# Patient Record
Sex: FEMALE | Race: BLACK OR AFRICAN AMERICAN | NOT HISPANIC OR LATINO | Employment: PART TIME | URBAN - METROPOLITAN AREA
[De-identification: names, ages, dates, MRNs, and addresses within clinical notes are randomized per-mention and may not be internally consistent; named-entity substitution may affect disease eponyms.]

---

## 2018-06-29 ENCOUNTER — LAB VISIT (OUTPATIENT)
Dept: LAB | Facility: HOSPITAL | Age: 18
End: 2018-06-29
Attending: SPECIALIST
Payer: MEDICAID

## 2018-06-29 ENCOUNTER — APPOINTMENT (OUTPATIENT)
Dept: LAB | Facility: HOSPITAL | Age: 18
End: 2018-06-29
Attending: SPECIALIST
Payer: MEDICAID

## 2018-06-29 ENCOUNTER — OFFICE VISIT (OUTPATIENT)
Dept: OBSTETRICS AND GYNECOLOGY | Facility: CLINIC | Age: 18
End: 2018-06-29
Payer: MEDICAID

## 2018-06-29 VITALS — WEIGHT: 273.81 LBS | BODY MASS INDEX: 41.5 KG/M2 | HEIGHT: 68 IN

## 2018-06-29 DIAGNOSIS — Z32.01 POSITIVE PREGNANCY TEST: ICD-10-CM

## 2018-06-29 DIAGNOSIS — Z32.01 POSITIVE PREGNANCY TEST: Primary | ICD-10-CM

## 2018-06-29 DIAGNOSIS — Z3A.10 10 WEEKS GESTATION OF PREGNANCY: ICD-10-CM

## 2018-06-29 LAB
ABO + RH BLD: NORMAL
BASOPHILS # BLD AUTO: 0.03 K/UL
BASOPHILS NFR BLD: 0.3 %
BLD GP AB SCN CELLS X3 SERPL QL: NORMAL
DIFFERENTIAL METHOD: ABNORMAL
EOSINOPHIL # BLD AUTO: 0.1 K/UL
EOSINOPHIL NFR BLD: 1.5 %
ERYTHROCYTE [DISTWIDTH] IN BLOOD BY AUTOMATED COUNT: 15.1 %
HBV SURFACE AG SERPL QL IA: NEGATIVE
HCT VFR BLD AUTO: 38.5 %
HGB BLD-MCNC: 12.5 G/DL
HIV 1+2 AB+HIV1 P24 AG SERPL QL IA: NEGATIVE
IMM GRANULOCYTES # BLD AUTO: 0.04 K/UL
IMM GRANULOCYTES NFR BLD AUTO: 0.4 %
LYMPHOCYTES # BLD AUTO: 1.4 K/UL
LYMPHOCYTES NFR BLD: 14.6 %
MCH RBC QN AUTO: 26.4 PG
MCHC RBC AUTO-ENTMCNC: 32.5 G/DL
MCV RBC AUTO: 81 FL
MONOCYTES # BLD AUTO: 0.5 K/UL
MONOCYTES NFR BLD: 4.9 %
NEUTROPHILS # BLD AUTO: 7.4 K/UL
NEUTROPHILS NFR BLD: 78.3 %
NRBC BLD-RTO: 0 /100 WBC
PLATELET # BLD AUTO: 383 K/UL
PMV BLD AUTO: 9.7 FL
RBC # BLD AUTO: 4.74 M/UL
TSH SERPL DL<=0.005 MIU/L-ACNC: 1.45 UIU/ML
WBC # BLD AUTO: 9.48 K/UL

## 2018-06-29 PROCEDURE — 88175 CYTOPATH C/V AUTO FLUID REDO: CPT

## 2018-06-29 PROCEDURE — 87491 CHLMYD TRACH DNA AMP PROBE: CPT

## 2018-06-29 PROCEDURE — 84443 ASSAY THYROID STIM HORMONE: CPT

## 2018-06-29 PROCEDURE — 87624 HPV HI-RISK TYP POOLED RSLT: CPT

## 2018-06-29 PROCEDURE — 86762 RUBELLA ANTIBODY: CPT

## 2018-06-29 PROCEDURE — 86592 SYPHILIS TEST NON-TREP QUAL: CPT

## 2018-06-29 PROCEDURE — 76817 TRANSVAGINAL US OBSTETRIC: CPT | Mod: 26,S$PBB,, | Performed by: SPECIALIST

## 2018-06-29 PROCEDURE — 99203 OFFICE O/P NEW LOW 30 MIN: CPT | Mod: 25,TH,S$PBB, | Performed by: SPECIALIST

## 2018-06-29 PROCEDURE — 85025 COMPLETE CBC W/AUTO DIFF WBC: CPT

## 2018-06-29 PROCEDURE — 87340 HEPATITIS B SURFACE AG IA: CPT

## 2018-06-29 PROCEDURE — 81220 CFTR GENE COM VARIANTS: CPT

## 2018-06-29 PROCEDURE — 76817 TRANSVAGINAL US OBSTETRIC: CPT | Mod: PBBFAC,PN | Performed by: SPECIALIST

## 2018-06-29 PROCEDURE — 99203 OFFICE O/P NEW LOW 30 MIN: CPT | Mod: PBBFAC,TH,PN | Performed by: SPECIALIST

## 2018-06-29 PROCEDURE — 36415 COLL VENOUS BLD VENIPUNCTURE: CPT | Mod: PO

## 2018-06-29 PROCEDURE — 99999 PR PBB SHADOW E&M-NEW PATIENT-LVL III: CPT | Mod: PBBFAC,,, | Performed by: SPECIALIST

## 2018-06-29 PROCEDURE — 86850 RBC ANTIBODY SCREEN: CPT

## 2018-06-29 PROCEDURE — 86901 BLOOD TYPING SEROLOGIC RH(D): CPT | Mod: 91

## 2018-06-29 PROCEDURE — 86703 HIV-1/HIV-2 1 RESULT ANTBDY: CPT

## 2018-06-29 RX ORDER — ONDANSETRON 4 MG/1
4 TABLET, ORALLY DISINTEGRATING ORAL EVERY 6 HOURS PRN
Qty: 15 TABLET | Refills: 2 | Status: SHIPPED | OUTPATIENT
Start: 2018-06-29 | End: 2018-11-16

## 2018-06-29 NOTE — PROGRESS NOTES
16 yo BF G1 presets for initial prenatal care  LMP Approx April 23.    Past Medical History:   Diagnosis Date    ADHD (attention deficit hyperactivity disorder)     Anxiety     Depression     H/O seasonal allergies        Past Surgical History:   Procedure Laterality Date    ADENOIDECTOMY      TONSILLECTOMY  2011       Family History   Problem Relation Age of Onset    Breast cancer Paternal Aunt     Ovarian cancer Neg Hx        Social History     Social History    Marital status: Single     Spouse name: N/A    Number of children: N/A    Years of education: N/A     Social History Main Topics    Smoking status: Never Smoker    Smokeless tobacco: Never Used    Alcohol use No    Drug use: No    Sexual activity: Yes     Other Topics Concern    None     Social History Narrative    None       No current outpatient prescriptions on file.     No current facility-administered medications for this visit.        Review of patient's allergies indicates:  No Known Allergies    Review of System:   General: no chills, fever, night sweats, weight gain or weight loss  Psychological: no depression or suicidal ideation  Breasts: no new or changing breast lumps, nipple discharge or masses.  Respiratory: no cough, shortness of breath, or wheezing  Cardiovascular: no chest pain or dyspnea on exertion  Gastrointestinal: no abdominal pain, change in bowel habits, or black or bloody stools  Genito-Urinary: no incontinence, urinary frequency/urgency or vulvar/vaginal symptoms, pelvic pain or abnormal vaginal bleeding.  Musculoskeletal: no gait disturbance or muscular weakness    General Appearance:  Alert, cooperative, no distress, appears stated age   Head:  Normocephalic, without obvious abnormality, atraumatic   Eyes:  PERRL, conjunctiva/corneas clear, EOM's intact, fundi benign, both eyes   Ears:  Normal TM's and external ear canals, both ears   Nose: Nares normal, septum midline,mucosa normal, no drainage or sinus  tenderness   Throat: Lips, mucosa, and tongue normal; teeth and gums normal   Neck: Supple, symmetrical, trachea midline, no adenopathy;  thyroid: not enlarged, symmetric, no tenderness/mass/nodules; no carotid bruit or JVD   Back:   Symmetric, no curvature, ROM normal, no CVA tenderness   Lungs:   Clear to auscultation bilaterally, respirations unlabored   Breasts:  No masses or tenderness   Heart:  Regular rate and rhythm, S1 and S2 normal, no murmur, rub, or gallop   Abdomen:   Soft, non-tender, bowel sounds active all four quadrants,  no masses, no organomegaly    Genitourinary:   External rectal exam shows no thrombosed external hemorrhoids.   Pelvic exam was performed with patient supine.  No labial fusion.  There is no rash, lesion or injury on the right labia.  There is no rash, lesion or injury on the left labia.  No bleeding and no signs of injury around the vaginal introitus, urethra is without lesions and well supported. The cervix is visualized with no discharge, lesions or friability.  No vaginal discharge found.  No significant Cystocele, Enterocele or rectocele, and uterus well supported.  Bimanual exam:  The urethra is normal to palpation and there are no palpable vaginal wall masses.  Uterus is not deviated, not enlarged, not fixed, normal shape and not tender.  Cervix exhibits no motion tenderness.   Right adnexum displays no mass and no tenderness.  Left adnexum displays no mass and no tenderness.   Extremities: Extremities normal, atraumatic, no cyanosis or edema   Pulses: 2+ and symmetric   Skin: Skin color, texture, turgor normal, no rashes or lesions   Lymph nodes: Cervical, supraclavicular, and axillary nodes normal   Neurologic: Normal       PAP  GC/CZ submitted    Procedure TVS 6.5 MHz probe  Positive IUP CRL 10w1  viewed by pt  EDC 1/24/2019    Prenatal care plan discussed  Obtain PNP  RTO 4 weeks

## 2018-06-29 NOTE — PROCEDURES
Procedures     Procedure TVS 6.5 MHz probe  Positive IUP CRL 10w1  viewed by pt  EDC 1/24/2019

## 2018-06-30 LAB
C TRACH DNA SPEC QL NAA+PROBE: NOT DETECTED
N GONORRHOEA DNA SPEC QL NAA+PROBE: NOT DETECTED
RH BLD: NORMAL
RPR SER QL: NORMAL

## 2018-07-02 LAB
CFTR MUT ANL BLD/T: NORMAL
RUBV IGG SER-ACNC: 41 IU/ML
RUBV IGG SER-IMP: REACTIVE

## 2018-07-06 LAB
HPV HR 12 DNA CVX QL NAA+PROBE: POSITIVE
HPV16 AG SPEC QL: NEGATIVE
HPV18 DNA SPEC QL NAA+PROBE: NEGATIVE

## 2018-07-27 ENCOUNTER — ROUTINE PRENATAL (OUTPATIENT)
Dept: OBSTETRICS AND GYNECOLOGY | Facility: CLINIC | Age: 18
End: 2018-07-27
Payer: MEDICAID

## 2018-07-27 VITALS — DIASTOLIC BLOOD PRESSURE: 74 MMHG | WEIGHT: 276.25 LBS | SYSTOLIC BLOOD PRESSURE: 130 MMHG | BODY MASS INDEX: 42 KG/M2

## 2018-07-27 DIAGNOSIS — Z3A.14 14 WEEKS GESTATION OF PREGNANCY: Primary | ICD-10-CM

## 2018-07-27 LAB
BILIRUB SERPL-MCNC: NORMAL MG/DL
BLOOD URINE, POC: NORMAL
COLOR, POC UA: YELLOW
GLUCOSE UR QL STRIP: NORMAL
KETONES UR QL STRIP: NORMAL
LEUKOCYTE ESTERASE URINE, POC: NORMAL
NITRITE, POC UA: NORMAL
PH, POC UA: 7
PROTEIN, POC: NORMAL
SPECIFIC GRAVITY, POC UA: NORMAL
UROBILINOGEN, POC UA: NORMAL

## 2018-07-27 PROCEDURE — 99999 PR PBB SHADOW E&M-EST. PATIENT-LVL II: CPT | Mod: PBBFAC,,, | Performed by: SPECIALIST

## 2018-07-27 PROCEDURE — 99213 OFFICE O/P EST LOW 20 MIN: CPT | Mod: TH,S$PBB,, | Performed by: SPECIALIST

## 2018-07-27 PROCEDURE — 81002 URINALYSIS NONAUTO W/O SCOPE: CPT | Mod: PBBFAC,PN | Performed by: SPECIALIST

## 2018-07-27 PROCEDURE — 99212 OFFICE O/P EST SF 10 MIN: CPT | Mod: PBBFAC,PN | Performed by: SPECIALIST

## 2018-07-27 NOTE — PROGRESS NOTES
Pt returns for PNC  Reveiwed and discussed PN lab  Discussed and recommend anatomy  U/s 18 weeks  I reviewed pt's past medical history, past and current meds, family history, allergies and reviewed problem list  RTO 4 weeks

## 2018-08-02 ENCOUNTER — NURSE TRIAGE (OUTPATIENT)
Dept: ADMINISTRATIVE | Facility: CLINIC | Age: 18
End: 2018-08-02

## 2018-08-02 NOTE — TELEPHONE ENCOUNTER
"Having medium red bleeding 15 weeks pregnant.    Reason for Disposition   Shoulder pain    Answer Assessment - Initial Assessment Questions  1. ONSET: "When did this bleeding start?"        Yesterday morning  2. DESCRIPTION: "Describe the bleeding that you are having." "How much bleeding is there?"     - SPOTTING: spotting, or pinkish / brownish mucous discharge; does not fill panti-liner or pad     - MILD:  less than 1 pad / hour; less than patient's usual menstrual bleeding    - MODERATE: 1-2 pads / hour; small-medium blood clots (e.g., pea, grape, small coin)     - SEVERE: soaking 2 or more pads/hour for 2 or more hours; bleeding not contained by pads or continuous red blood from vagina; large blood clots (e.g., golf ball, large coin)       Only when wiping self X 2 yesterday morning and again this morning. Had sexual intercourse on Monday and Tuesday. And had bleeding on Wednesday and Thursday morning  3. ABDOMINAL PAIN SEVERITY: If present, ask: "How bad is it?"  (e.g., Scale 1-10; mild, moderate, or severe)    - MILD (1-3): doesn't interfere with normal activities, abdomen soft and not tender to touch     - MODERATE (4-7): interferes with normal activities or awakens from sleep, tender to touch     - SEVERE (8-10): excruciating pain, doubled over, unable to do any normal activities      Abdominal cramping 3-4 constant for about 10 min  4. PREGNANCY: "Do you know how many weeks or months pregnant you are?" "When was the first day of your last normal menstrual period?"      15 weeks  5. HEMODYNAMIC STATUS: "Are you weak or feeling lightheaded?" If so, ask: "Can you stand and walk normally?"       denies  6. OTHER SYMPTOMS: "What other symptoms are you having with the bleeding?" (e.g., passed tissue, vaginal discharge, fever, menstrual-type cramps)      Round ligament pain    Protocols used: ST PREGNANCY - VAGINAL BLEEDING LESS THAN 20 WEEKS EGA-A-      "

## 2018-08-17 ENCOUNTER — TELEPHONE (OUTPATIENT)
Dept: OBSTETRICS AND GYNECOLOGY | Facility: CLINIC | Age: 18
End: 2018-08-17

## 2018-08-17 NOTE — TELEPHONE ENCOUNTER
----- Message from Alice Trinh sent at 8/17/2018 10:16 AM CDT -----  Contact: Patient  Type: Needs Medical Advice    Who Called:  Patient  Best Call Back Number: [phone] (home)     Additional Information: Patient is requesting a clearance to go to the dentist, please call to advise.

## 2018-08-24 ENCOUNTER — ROUTINE PRENATAL (OUTPATIENT)
Dept: OBSTETRICS AND GYNECOLOGY | Facility: CLINIC | Age: 18
End: 2018-08-24
Payer: MEDICAID

## 2018-08-24 ENCOUNTER — HOSPITAL ENCOUNTER (OUTPATIENT)
Dept: RADIOLOGY | Facility: HOSPITAL | Age: 18
Discharge: HOME | End: 2018-08-24
Attending: SPECIALIST
Payer: MEDICAID

## 2018-08-24 VITALS
WEIGHT: 277.13 LBS | SYSTOLIC BLOOD PRESSURE: 118 MMHG | DIASTOLIC BLOOD PRESSURE: 78 MMHG | BODY MASS INDEX: 40.92 KG/M2

## 2018-08-24 DIAGNOSIS — Z3A.18 18 WEEKS GESTATION OF PREGNANCY: Primary | ICD-10-CM

## 2018-08-24 DIAGNOSIS — Z3A.14 14 WEEKS GESTATION OF PREGNANCY: ICD-10-CM

## 2018-08-24 LAB
BILIRUB SERPL-MCNC: NORMAL MG/DL
BLOOD URINE, POC: NORMAL
COLOR, POC UA: YELLOW
GLUCOSE UR QL STRIP: NORMAL
KETONES UR QL STRIP: NORMAL
LEUKOCYTE ESTERASE URINE, POC: NORMAL
NITRITE, POC UA: NORMAL
PH, POC UA: 5
PROTEIN, POC: NORMAL
SPECIFIC GRAVITY, POC UA: NORMAL
UROBILINOGEN, POC UA: NORMAL

## 2018-08-24 PROCEDURE — 99212 OFFICE O/P EST SF 10 MIN: CPT | Mod: PBBFAC,25,PN | Performed by: SPECIALIST

## 2018-08-24 PROCEDURE — 81002 URINALYSIS NONAUTO W/O SCOPE: CPT | Mod: PBBFAC,PN | Performed by: SPECIALIST

## 2018-08-24 PROCEDURE — 76805 OB US >/= 14 WKS SNGL FETUS: CPT | Mod: TC,PN

## 2018-08-24 PROCEDURE — 76805 OB US >/= 14 WKS SNGL FETUS: CPT | Mod: 26,,, | Performed by: RADIOLOGY

## 2018-08-24 PROCEDURE — 99213 OFFICE O/P EST LOW 20 MIN: CPT | Mod: TH,S$PBB,, | Performed by: SPECIALIST

## 2018-08-24 PROCEDURE — 99999 PR PBB SHADOW E&M-EST. PATIENT-LVL II: CPT | Mod: PBBFAC,,, | Performed by: SPECIALIST
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
## 2018-09-21 ENCOUNTER — ROUTINE PRENATAL (OUTPATIENT)
Dept: OBSTETRICS AND GYNECOLOGY | Facility: CLINIC | Age: 18
End: 2018-09-21
Payer: MEDICAID

## 2018-09-21 VITALS
DIASTOLIC BLOOD PRESSURE: 60 MMHG | SYSTOLIC BLOOD PRESSURE: 122 MMHG | BODY MASS INDEX: 40.96 KG/M2 | WEIGHT: 277.31 LBS

## 2018-09-21 DIAGNOSIS — Z3A.26 26 WEEKS GESTATION OF PREGNANCY: Primary | ICD-10-CM

## 2018-09-21 PROCEDURE — 99213 OFFICE O/P EST LOW 20 MIN: CPT | Mod: TH,S$PBB,, | Performed by: SPECIALIST

## 2018-09-21 PROCEDURE — 99999 PR PBB SHADOW E&M-EST. PATIENT-LVL II: CPT | Mod: PBBFAC,,, | Performed by: SPECIALIST

## 2018-09-21 PROCEDURE — 99212 OFFICE O/P EST SF 10 MIN: CPT | Mod: PBBFAC,PN | Performed by: SPECIALIST

## 2018-09-21 NOTE — PROGRESS NOTES
Pt returns for continued care  Discussed GD screen and Rhogam administration on RTO   I reviewed pt's past medical history, past and current meds, family history, allergies and reviewed problem list  Plan RTO 4 weeks  GD screen and Rhogam

## 2018-09-27 ENCOUNTER — TELEPHONE (OUTPATIENT)
Dept: OBSTETRICS AND GYNECOLOGY | Facility: CLINIC | Age: 18
End: 2018-09-27

## 2018-09-27 NOTE — TELEPHONE ENCOUNTER
Patient states ate peanuts now tongue swollen, denies any shortness of breathe or difficulty breathing, instructed to take Benadryl as directed, if no relief or increased symptoms go to ED for eval, verbalized understanding

## 2018-09-27 NOTE — TELEPHONE ENCOUNTER
----- Message from Cyndi Granger sent at 9/27/2018  4:11 PM CDT -----  Contact: self  Type: Needs Medical Advice    Who Called:  self  Best Call Back Number: [phone]  Additional Information: Patient needs to know if she can take Benadryl while pregnant. Please call patient. Thanks!

## 2018-10-19 ENCOUNTER — LAB VISIT (OUTPATIENT)
Dept: LAB | Facility: HOSPITAL | Age: 18
End: 2018-10-19
Attending: SPECIALIST
Payer: MEDICAID

## 2018-10-19 ENCOUNTER — ROUTINE PRENATAL (OUTPATIENT)
Dept: OBSTETRICS AND GYNECOLOGY | Facility: CLINIC | Age: 18
End: 2018-10-19
Payer: MEDICAID

## 2018-10-19 VITALS
SYSTOLIC BLOOD PRESSURE: 140 MMHG | BODY MASS INDEX: 41.97 KG/M2 | DIASTOLIC BLOOD PRESSURE: 74 MMHG | WEIGHT: 284.19 LBS

## 2018-10-19 DIAGNOSIS — Z3A.26 26 WEEKS GESTATION OF PREGNANCY: Primary | ICD-10-CM

## 2018-10-19 DIAGNOSIS — Z3A.26 26 WEEKS GESTATION OF PREGNANCY: ICD-10-CM

## 2018-10-19 LAB
BILIRUB SERPL-MCNC: NORMAL MG/DL
BLOOD URINE, POC: NORMAL
COLOR, POC UA: YELLOW
GLUCOSE SERPL-MCNC: 132 MG/DL
GLUCOSE UR QL STRIP: NORMAL
KETONES UR QL STRIP: NORMAL
LEUKOCYTE ESTERASE URINE, POC: NORMAL
NITRITE, POC UA: NORMAL
PH, POC UA: 7
PROTEIN, POC: NORMAL
SPECIFIC GRAVITY, POC UA: NORMAL
UROBILINOGEN, POC UA: NORMAL

## 2018-10-19 PROCEDURE — 81002 URINALYSIS NONAUTO W/O SCOPE: CPT | Mod: PBBFAC,PN | Performed by: SPECIALIST

## 2018-10-19 PROCEDURE — 36415 COLL VENOUS BLD VENIPUNCTURE: CPT | Mod: PO

## 2018-10-19 PROCEDURE — 99213 OFFICE O/P EST LOW 20 MIN: CPT | Mod: TH,S$PBB,, | Performed by: SPECIALIST

## 2018-10-19 PROCEDURE — 99999 PR PBB SHADOW E&M-EST. PATIENT-LVL II: CPT | Mod: PBBFAC,,, | Performed by: SPECIALIST

## 2018-10-19 PROCEDURE — 82950 GLUCOSE TEST: CPT

## 2018-10-19 PROCEDURE — 99212 OFFICE O/P EST SF 10 MIN: CPT | Mod: PBBFAC,PN | Performed by: SPECIALIST

## 2018-10-19 RX ORDER — CLOTRIMAZOLE AND BETAMETHASONE DIPROPIONATE 10; .64 MG/G; MG/G
CREAM TOPICAL
Qty: 45 G | Refills: 0 | Status: ON HOLD | OUTPATIENT
Start: 2018-10-19 | End: 2019-01-07 | Stop reason: HOSPADM

## 2018-10-19 NOTE — PROGRESS NOTES
Pt returns for continued care   Excellent fetal movement   Discussed GD screen today and will review  Rec Rhogam on RTO 4 weeks  I reviewed pt's past medical history, past and current meds, family history, allergies and reviewed problem list  RTO 4 weeks

## 2018-11-16 ENCOUNTER — ROUTINE PRENATAL (OUTPATIENT)
Dept: OBSTETRICS AND GYNECOLOGY | Facility: CLINIC | Age: 18
End: 2018-11-16
Payer: MEDICAID

## 2018-11-16 VITALS — WEIGHT: 291.88 LBS | BODY MASS INDEX: 43.1 KG/M2 | SYSTOLIC BLOOD PRESSURE: 128 MMHG | DIASTOLIC BLOOD PRESSURE: 72 MMHG

## 2018-11-16 DIAGNOSIS — Z3A.30 30 WEEKS GESTATION OF PREGNANCY: Primary | ICD-10-CM

## 2018-11-16 LAB
BILIRUB SERPL-MCNC: NEGATIVE MG/DL
BLOOD URINE, POC: NEGATIVE
COLOR, POC UA: NORMAL
GLUCOSE UR QL STRIP: NORMAL
KETONES UR QL STRIP: NEGATIVE
LEUKOCYTE ESTERASE URINE, POC: NORMAL
NITRITE, POC UA: NEGATIVE
PH, POC UA: 7
PROTEIN, POC: NORMAL
SPECIFIC GRAVITY, POC UA: NORMAL
UROBILINOGEN, POC UA: NORMAL

## 2018-11-16 PROCEDURE — 99999 PR PBB SHADOW E&M-EST. PATIENT-LVL II: CPT | Mod: PBBFAC,,, | Performed by: SPECIALIST

## 2018-11-16 PROCEDURE — 96372 THER/PROPH/DIAG INJ SC/IM: CPT | Mod: PBBFAC,PN

## 2018-11-16 PROCEDURE — 81002 URINALYSIS NONAUTO W/O SCOPE: CPT | Mod: PBBFAC,PN | Performed by: SPECIALIST

## 2018-11-16 PROCEDURE — 99213 OFFICE O/P EST LOW 20 MIN: CPT | Mod: TH,S$PBB,, | Performed by: SPECIALIST

## 2018-11-16 PROCEDURE — 99212 OFFICE O/P EST SF 10 MIN: CPT | Mod: PBBFAC,PN | Performed by: SPECIALIST

## 2018-11-16 NOTE — PROGRESS NOTES
Excellent fetal movement   Discussed GD screen results and recommend Rhogam repeated today  I reviewed pt's past medical history, past and current meds, family history, allergies and reviewed problem list  Plan Daily kick counts   RTO 2 weeks

## 2018-11-30 ENCOUNTER — ROUTINE PRENATAL (OUTPATIENT)
Dept: OBSTETRICS AND GYNECOLOGY | Facility: CLINIC | Age: 18
End: 2018-11-30
Payer: MEDICAID

## 2018-11-30 VITALS — DIASTOLIC BLOOD PRESSURE: 74 MMHG | SYSTOLIC BLOOD PRESSURE: 130 MMHG | BODY MASS INDEX: 43.59 KG/M2 | WEIGHT: 293 LBS

## 2018-11-30 DIAGNOSIS — Z3A.32 32 WEEKS GESTATION OF PREGNANCY: Primary | ICD-10-CM

## 2018-11-30 LAB
BILIRUB SERPL-MCNC: NEGATIVE MG/DL
BLOOD URINE, POC: NEGATIVE
COLOR, POC UA: NORMAL
GLUCOSE UR QL STRIP: NORMAL
KETONES UR QL STRIP: NEGATIVE
LEUKOCYTE ESTERASE URINE, POC: NORMAL
NITRITE, POC UA: NEGATIVE
PH, POC UA: 6
PROTEIN, POC: NORMAL
SPECIFIC GRAVITY, POC UA: NORMAL
UROBILINOGEN, POC UA: NORMAL

## 2018-11-30 PROCEDURE — 99999 PR PBB SHADOW E&M-EST. PATIENT-LVL II: CPT | Mod: PBBFAC,,, | Performed by: SPECIALIST

## 2018-11-30 PROCEDURE — 81002 URINALYSIS NONAUTO W/O SCOPE: CPT | Mod: PBBFAC,PN | Performed by: SPECIALIST

## 2018-11-30 PROCEDURE — 99213 OFFICE O/P EST LOW 20 MIN: CPT | Mod: TH,S$PBB,, | Performed by: SPECIALIST

## 2018-11-30 PROCEDURE — 99212 OFFICE O/P EST SF 10 MIN: CPT | Mod: PBBFAC,PN | Performed by: SPECIALIST

## 2018-11-30 NOTE — PROGRESS NOTES
Excellent fetal movement  Discussed daily kick counts  I reviewed pt's past medical history, past and current meds, family history, allergies and reviewed problem list  RTO 2 weeks

## 2018-12-14 ENCOUNTER — ROUTINE PRENATAL (OUTPATIENT)
Dept: OBSTETRICS AND GYNECOLOGY | Facility: CLINIC | Age: 18
End: 2018-12-14
Payer: MEDICAID

## 2018-12-14 VITALS — WEIGHT: 293 LBS | DIASTOLIC BLOOD PRESSURE: 74 MMHG | SYSTOLIC BLOOD PRESSURE: 132 MMHG | BODY MASS INDEX: 44.6 KG/M2

## 2018-12-14 DIAGNOSIS — Z3A.34 34 WEEKS GESTATION OF PREGNANCY: Primary | ICD-10-CM

## 2018-12-14 LAB
BILIRUB SERPL-MCNC: NEGATIVE MG/DL
BLOOD URINE, POC: NEGATIVE
COLOR, POC UA: NORMAL
GLUCOSE UR QL STRIP: NORMAL
KETONES UR QL STRIP: NEGATIVE
LEUKOCYTE ESTERASE URINE, POC: NORMAL
NITRITE, POC UA: NEGATIVE
PH, POC UA: 5
PROTEIN, POC: NEGATIVE
SPECIFIC GRAVITY, POC UA: NORMAL
UROBILINOGEN, POC UA: NORMAL

## 2018-12-14 PROCEDURE — 81002 URINALYSIS NONAUTO W/O SCOPE: CPT | Mod: PBBFAC,PN | Performed by: SPECIALIST

## 2018-12-14 PROCEDURE — 99999 PR PBB SHADOW E&M-EST. PATIENT-LVL II: CPT | Mod: PBBFAC,,, | Performed by: SPECIALIST

## 2018-12-14 PROCEDURE — 99212 OFFICE O/P EST SF 10 MIN: CPT | Mod: PBBFAC,PN | Performed by: SPECIALIST

## 2018-12-14 PROCEDURE — 99213 OFFICE O/P EST LOW 20 MIN: CPT | Mod: TH,S$PBB,, | Performed by: SPECIALIST

## 2018-12-14 NOTE — PROGRESS NOTES
Pt returns for continued care  Excellent fetal movement  I reviewed pt's past medical history, past and current meds, family history, allergies and reviewed problem list  I discussed and recommend repeatu/s 2 weeks as well as GBS screening and Tdap  Plan Daily kick counts   RTO 2 weeks

## 2018-12-28 ENCOUNTER — HOSPITAL ENCOUNTER (OUTPATIENT)
Dept: RADIOLOGY | Facility: HOSPITAL | Age: 18
Discharge: HOME | End: 2018-12-28
Attending: SPECIALIST
Payer: MEDICAID

## 2018-12-28 ENCOUNTER — ROUTINE PRENATAL (OUTPATIENT)
Dept: OBSTETRICS AND GYNECOLOGY | Facility: CLINIC | Age: 18
End: 2018-12-28
Payer: MEDICAID

## 2018-12-28 VITALS — DIASTOLIC BLOOD PRESSURE: 64 MMHG | SYSTOLIC BLOOD PRESSURE: 114 MMHG | WEIGHT: 293 LBS | BODY MASS INDEX: 45.45 KG/M2

## 2018-12-28 DIAGNOSIS — Z3A.36 36 WEEKS GESTATION OF PREGNANCY: Primary | ICD-10-CM

## 2018-12-28 DIAGNOSIS — Z3A.34 34 WEEKS GESTATION OF PREGNANCY: ICD-10-CM

## 2018-12-28 PROCEDURE — 87081 CULTURE SCREEN ONLY: CPT

## 2018-12-28 PROCEDURE — 76816 OB US FOLLOW-UP PER FETUS: CPT | Mod: 26,,, | Performed by: RADIOLOGY

## 2018-12-28 PROCEDURE — 87147 CULTURE TYPE IMMUNOLOGIC: CPT

## 2018-12-28 PROCEDURE — 81002 URINALYSIS NONAUTO W/O SCOPE: CPT | Mod: PBBFAC,PN | Performed by: SPECIALIST

## 2018-12-28 PROCEDURE — 76816 OB US FOLLOW-UP PER FETUS: CPT | Mod: TC,PN

## 2018-12-28 PROCEDURE — 99213 OFFICE O/P EST LOW 20 MIN: CPT | Mod: PBBFAC,PN,25 | Performed by: SPECIALIST

## 2018-12-28 PROCEDURE — 99999 PR PBB SHADOW E&M-EST. PATIENT-LVL III: CPT | Mod: PBBFAC,,, | Performed by: SPECIALIST

## 2018-12-28 PROCEDURE — 87184 SC STD DISK METHOD PER PLATE: CPT

## 2018-12-28 PROCEDURE — 99213 OFFICE O/P EST LOW 20 MIN: CPT | Mod: TH,S$PBB,, | Performed by: SPECIALIST

## 2018-12-28 NOTE — PROGRESS NOTES
Pt returns for care  Excellent fetal movement  Pt to complete fetal growth u/s today  EXAM Cervix LTC vertex  I reviewed pt's past medical history, past and current meds, family history, allergies and reviewed problem list  Plan Charleeneens for Tdap  Fetal kick counts  RTO 1 week

## 2019-01-02 LAB — BACTERIA SPEC AEROBE CULT: NORMAL

## 2019-01-04 PROBLEM — O42.90 AMNIOTIC FLUID LEAKING: Status: ACTIVE | Noted: 2019-01-04

## 2019-01-07 PROBLEM — Z98.891 S/P C-SECTION: Status: ACTIVE | Noted: 2019-01-07

## 2019-01-09 ENCOUNTER — TELEPHONE (OUTPATIENT)
Dept: OBSTETRICS AND GYNECOLOGY | Facility: CLINIC | Age: 19
End: 2019-01-09

## 2019-01-09 NOTE — TELEPHONE ENCOUNTER
----- Message from Izabela Paez sent at 1/9/2019  9:29 AM CST -----  Contact: Mindi  Type: Needs Medical Advice    Who Called:  patient  Symptoms (please be specific):  Both legs and ankles are swollen   How long has patient had these symptoms:  Five days  Pharmacy name and phone #:  na  Best Call Back Number: [phone]  Additional Information:  Asking if this is normal after giving birth. Thanks!

## 2019-01-11 ENCOUNTER — TELEPHONE (OUTPATIENT)
Dept: OBSTETRICS AND GYNECOLOGY | Facility: CLINIC | Age: 19
End: 2019-01-11

## 2019-01-11 NOTE — TELEPHONE ENCOUNTER
----- Message from Andreas Johnson sent at 1/11/2019 12:19 PM CST -----  Contact: Patient  Type:  RX Refill Request    Who Called:  Patient  Refill or New Rx:  Refill  RX Name and Strength:  oxyCODONE-acetaminophen (PERCOCET) 5-325 mg per tablet  How is the patient currently taking it? (ex. 1XDay):  Take 1 tablet by mouth every 4 (four) hours as needed for Pain. - Oral  Is this a 30 day or 90 day RX:  30  Preferred Pharmacy with phone number:    [id number] - [place], [place] - [address]  [address]  [place] [address]  Phone: [phone] Fax: [phone]  Local or Mail Order:  Local  Ordering Provider:  Dr. Ella Tejeda Call Back Number:  [phone]  Additional Information:  NA

## 2019-01-21 ENCOUNTER — POSTPARTUM VISIT (OUTPATIENT)
Dept: OBSTETRICS AND GYNECOLOGY | Facility: CLINIC | Age: 19
End: 2019-01-21
Payer: MEDICAID

## 2019-01-21 VITALS
DIASTOLIC BLOOD PRESSURE: 68 MMHG | SYSTOLIC BLOOD PRESSURE: 128 MMHG | HEIGHT: 70 IN | BODY MASS INDEX: 41.95 KG/M2 | WEIGHT: 293 LBS

## 2019-01-21 PROCEDURE — 99999 PR PBB SHADOW E&M-EST. PATIENT-LVL III: CPT | Mod: PBBFAC,,, | Performed by: SPECIALIST

## 2019-01-21 PROCEDURE — 0503F PR POSTPARTUM CARE VISIT: ICD-10-PCS | Mod: ,,, | Performed by: SPECIALIST

## 2019-01-21 PROCEDURE — 0503F POSTPARTUM CARE VISIT: CPT | Mod: ,,, | Performed by: SPECIALIST

## 2019-01-21 PROCEDURE — 99213 OFFICE O/P EST LOW 20 MIN: CPT | Mod: PBBFAC,PN | Performed by: SPECIALIST

## 2019-01-21 PROCEDURE — 99999 PR PBB SHADOW E&M-EST. PATIENT-LVL III: ICD-10-PCS | Mod: PBBFAC,,, | Performed by: SPECIALIST

## 2019-01-21 NOTE — PROGRESS NOTES
17 yo BF 2 weeks s/p repeat LT . Pt doing well and denies f/c, n/v, menorrhagia. PP depression screening negative  Past Medical History:   Diagnosis Date    ADHD (attention deficit hyperactivity disorder)     Anxiety     Depression     H/O seasonal allergies        Past Surgical History:   Procedure Laterality Date    ADENOIDECTOMY       SECTION       SECTION Left 2019    Per[facility] by [facility] at [facility]    TONSILLECTOMY         Family History   Problem Relation Age of Onset    Breast cancer Paternal Aunt     Multiple sclerosis Father     Seizures Father     Asthma Sister     Ovarian cancer Neg Hx        Social History     Socioeconomic History    Marital status: Single     Spouse name: None    Number of children: None    Years of education: None    Highest education level: None   Social Needs    Financial resource strain: None    Food insecurity - worry: None    Food insecurity - inability: None    Transportation needs - medical: None    Transportation needs - non-medical: None   Occupational History    None   Tobacco Use    Smoking status: Never Smoker    Smokeless tobacco: Never Used   Substance and Sexual Activity    Alcohol use: No    Drug use: No    Sexual activity: Yes     Partners: Male   Other Topics Concern    None   Social History Narrative    None       Current Outpatient Medications   Medication Sig Dispense Refill    prenatal vit,estela 74-iron-folic 27 mg iron- 1 mg Tab Take 1 tablet by mouth once daily. 100 tablet 2    oxyCODONE-acetaminophen (PERCOCET)  mg per tablet Take 1 tablet by mouth every 4 (four) hours as needed. 20 tablet 0    oxyCODONE-acetaminophen (PERCOCET) 5-325 mg per tablet Take 1 tablet by mouth every 4 (four) hours as needed for Pain. 20 tablet 0     No current facility-administered medications for this visit.        Review of patient's allergies indicates:  No Known Allergies    Review of System:    General: no chills, fever, night sweats, weight gain or weight loss  Psychological: no depression or suicidal ideation  Breasts: no new or changing breast lumps, nipple discharge or masses.  Respiratory: no cough, shortness of breath, or wheezing  Cardiovascular: no chest pain or dyspnea on exertion  Gastrointestinal: no abdominal pain, change in bowel habits, or black or bloody stools  Genito-Urinary: no incontinence, urinary frequency/urgency or vulvar/vaginal symptoms, pelvic pain or abnormal vaginal bleeding.  Musculoskeletal: no gait disturbance or muscular weakness    VSS  Chest CTA  Abdomen soft, incision well approx  No drainage erythema or crepitus    Plan pelvic rest  Lifting precautions   RTO 4 weeks

## 2019-02-18 ENCOUNTER — POSTPARTUM VISIT (OUTPATIENT)
Dept: OBSTETRICS AND GYNECOLOGY | Facility: CLINIC | Age: 19
End: 2019-02-18
Payer: MEDICAID

## 2019-02-18 VITALS
BODY MASS INDEX: 42.88 KG/M2 | SYSTOLIC BLOOD PRESSURE: 110 MMHG | WEIGHT: 290.38 LBS | DIASTOLIC BLOOD PRESSURE: 70 MMHG

## 2019-02-18 DIAGNOSIS — Z12.4 ENCOUNTER FOR PAP SMEAR OF CERVIX WITH HPV DNA COTESTING: Primary | ICD-10-CM

## 2019-02-18 PROCEDURE — 99213 PR OFFICE/OUTPT VISIT, EST, LEVL III, 20-29 MIN: ICD-10-PCS | Mod: S$PBB,,, | Performed by: SPECIALIST

## 2019-02-18 PROCEDURE — 99999 PR PBB SHADOW E&M-EST. PATIENT-LVL III: ICD-10-PCS | Mod: PBBFAC,,, | Performed by: SPECIALIST

## 2019-02-18 PROCEDURE — 99999 PR PBB SHADOW E&M-EST. PATIENT-LVL III: CPT | Mod: PBBFAC,,, | Performed by: SPECIALIST

## 2019-02-18 PROCEDURE — 88175 CYTOPATH C/V AUTO FLUID REDO: CPT

## 2019-02-18 PROCEDURE — 87624 HPV HI-RISK TYP POOLED RSLT: CPT

## 2019-02-18 PROCEDURE — 99213 OFFICE O/P EST LOW 20 MIN: CPT | Mod: PBBFAC,PN | Performed by: SPECIALIST

## 2019-02-18 PROCEDURE — 99213 OFFICE O/P EST LOW 20 MIN: CPT | Mod: S$PBB,,, | Performed by: SPECIALIST

## 2019-02-18 NOTE — PROGRESS NOTES
17 yo BF 6 weeks s/p repeat . Pt doing well. Breast feeding. Denies PP depression, pain, n/v, menorrhagia  Past Medical History:   Diagnosis Date    ADHD (attention deficit hyperactivity disorder)     Anxiety     Depression     H/O seasonal allergies        Past Surgical History:   Procedure Laterality Date    ADENOIDECTOMY       SECTION       SECTION Left 2019    [facility]    TONSILLECTOMY         Family History   Problem Relation Age of Onset    Breast cancer Paternal Aunt     Multiple sclerosis Father     Seizures Father     Asthma Sister     Ovarian cancer Neg Hx        Social History     Socioeconomic History    Marital status: Single     Spouse name: None    Number of children: None    Years of education: None    Highest education level: None   Social Needs    Financial resource strain: None    Food insecurity - worry: None    Food insecurity - inability: None    Transportation needs - medical: None    Transportation needs - non-medical: None   Occupational History    None   Tobacco Use    Smoking status: Never Smoker    Smokeless tobacco: Never Used   Substance and Sexual Activity    Alcohol use: No    Drug use: No    Sexual activity: Yes     Partners: Male   Other Topics Concern    None   Social History Narrative    None       Current Outpatient Medications   Medication Sig Dispense Refill    prenatal vit,estela 74-iron-folic 27 mg iron- 1 mg Tab Take 1 tablet by mouth once daily. 100 tablet 2    mupirocin (BACTROBAN) 2 % ointment Apply topically 3 (three) times daily. 22 g 0     No current facility-administered medications for this visit.        Review of patient's allergies indicates:  No Known Allergies    Review of System:   General: no chills, fever, night sweats, weight gain or weight loss  Psychological: no depression or suicidal ideation  Breasts: no new or changing breast lumps, nipple discharge or  masses.  Respiratory: no cough, shortness of breath, or wheezing  Cardiovascular: no chest pain or dyspnea on exertion  Gastrointestinal: no abdominal pain, change in bowel habits, or black or bloody stools  Genito-Urinary: no incontinence, urinary frequency/urgency or vulvar/vaginal symptoms, pelvic pain or abnormal vaginal bleeding.  Musculoskeletal: no gait disturbance or muscular weakness                                              General Appearance    A and O x 4, Cooperative, no distress   Breasts    Abdomen   Deferred  Soft, non-tender, bowel sounds active all four quadrants,  no masses, no organomegaly    Genitourinary:   External rectal exam shows no thrombosed external hemorrhoids.   Pelvic exam was performed with patient supine.  No labial fusion.  There is no rash, lesion or injury on the right labia.  There is no rash, lesion or injury on the left labia.  No bleeding and no signs of injury around the vaginal introitus, urethra is without lesions and well supported. The cervix is visualized with no discharge, lesions or friability.  No vaginal discharge found.  No significant Cystocele, Enterocele or rectocele, and uterus well supported.  Bimanual exam:  The urethra is normal to palpation and there are no palpable vaginal wall masses.  Uterus is not deviated, not enlarged, not fixed, normal shape and not tender.  Cervix exhibits no motion tenderness.   Right adnexum displays no mass and no tenderness.  Left adnexum displays no mass and no tenderness.   Extremities: Extremities normal, atraumatic, no cyanosis or edema                       PAP submitted    Plan Contine PNV with breast feeding   RTO 1 year/prn

## 2019-02-22 LAB
HPV HR 12 DNA CVX QL NAA+PROBE: NEGATIVE
HPV16 AG SPEC QL: NEGATIVE
HPV18 DNA SPEC QL NAA+PROBE: NEGATIVE

## 2019-02-25 ENCOUNTER — TELEPHONE (OUTPATIENT)
Dept: OBSTETRICS AND GYNECOLOGY | Facility: CLINIC | Age: 19
End: 2019-02-25

## 2019-02-25 NOTE — TELEPHONE ENCOUNTER
----- Message from Ernestina Goldsmith sent at 2/23/2019  9:19 AM CST -----  Contact: [phone]  Patient is requesting a call back from the nurse want to know can she take one a day women along with prenatals while breastfeeding.    Please call the patient upon request at phone number [phone].

## 2019-03-06 ENCOUNTER — TELEPHONE (OUTPATIENT)
Dept: OBSTETRICS AND GYNECOLOGY | Facility: CLINIC | Age: 19
End: 2019-03-06

## 2019-03-06 NOTE — TELEPHONE ENCOUNTER
----- Message from Veronica Nance sent at 3/6/2019 11:40 AM CST -----  Contact: self [phone]  Please call her with the pap smear results.  Thank you!

## 2019-06-06 ENCOUNTER — TELEPHONE (OUTPATIENT)
Dept: OBSTETRICS AND GYNECOLOGY | Facility: CLINIC | Age: 19
End: 2019-06-06

## 2019-06-06 NOTE — TELEPHONE ENCOUNTER
----- Message from Jacoby Ponce sent at 6/6/2019 12:04 PM CDT -----  Contact: pt  Type: Needs Medical Advice    Who Called:  pt    Best Call Back Number: [phone]  Additional Information: pt does not know why an order was put in for a US OB TRANSVAGINAL. Pt would like someone in the office to call her to discuss this.

## 2019-06-13 ENCOUNTER — CLINICAL SUPPORT (OUTPATIENT)
Dept: REHABILITATION | Facility: HOSPITAL | Age: 19
End: 2019-06-13
Payer: MEDICAID

## 2019-06-13 DIAGNOSIS — G89.29 CHRONIC PAIN OF RIGHT KNEE: ICD-10-CM

## 2019-06-13 DIAGNOSIS — M25.561 CHRONIC PAIN OF RIGHT KNEE: ICD-10-CM

## 2019-06-13 DIAGNOSIS — R26.9 GAIT ABNORMALITY: ICD-10-CM

## 2019-06-13 PROCEDURE — 97161 PT EVAL LOW COMPLEX 20 MIN: CPT | Mod: PO | Performed by: PHYSICAL THERAPIST

## 2019-06-13 NOTE — PLAN OF CARE
[facility]  Physical Therapy Initial Evaluation    Name: Mindi Tate  Clinic Number: [id number]    Therapy Diagnosis:   Encounter Diagnoses   Name Primary?    Chronic pain of right knee     Gait abnormality      Physician: Sudeep Javier MD    Physician Orders: PT Eval and Treat   Medical Diagnosis from Referral: Right knee pain   Chronic instability of right knee     Evaluation Date: 2019  Authorization Period Expiration: 2019  Plan of Care Expiration: 2019  Visit # / Visits authorized:     Time In: 1400  Time Out: 1500  Total Billable Time: 60 minutes    Precautions: Standard    Subjective   Date of onset: 2019  History of current condition - Mindi reports: having right knee pain with standing, walking. No falls. No trauma noted. No numbness/tingling noted. No radicular pain. Patient recalls having right knee pain in high school as well with power lifting and sports.  Patient recent acute episode of right knee pain 2019 after prolong standing/walking with work related tasks as well.       Past Medical History:   Diagnosis Date    ADHD (attention deficit hyperactivity disorder)     Anxiety     Depression     H/O seasonal allergies      Mindi Tate  has a past surgical history that includes Tonsillectomy (); Adenoidectomy;  section (Left, 2019); and  section.    Mindi has a current medication list which includes the following prescription(s): amoxicillin, mupirocin, and prenatal vit,estela 74-iron-folic.    Review of patient's allergies indicates:  No Known Allergies     Imaging: None noted    Prior Therapy: none noted  Social History:  lives with their family, one story  Occupation: Office work  Prior Level of Function: independent  Current Level of Function: modified independent, increase time noted with walking, performing standing tasks.  Recent or major surgery: none noted  Accidents: none  noted    Pain:  Current 7/10, worst 10/10, best 0/10   Location: bilateral knees   Description: Variable, dull, achy  Aggravating Factors: Standing  Easing Factors: rest and seated work     Night pain: none    Pts goals: Decrease pain to knees. Be able to tolerate standing/walking longer periods with < pain.    Objective   Posture: bilateral pes-planus, genu-recurvatum    Range of Motion:   Knee Left active Left Passive Right Active R passive   Flexion 120 120 120 120   Extension 0 0 0 0       Lower Extremity Strength  Right LE  Left LE    Knee extension: 4-/5 Knee extension: 4+/5   Knee flexion: 4/5 Knee flexion: 5/5   Hip flexion: 4+/5 Hip flexion: 5/5         Hip abduction: 4-/5 Hip abduction: 4/5   Hip adduction: 5/5 Hip adduction 5/5   Ankle dorsiflexion: 5/5 Ankle dorsiflexion: 5/5   Ankle plantarflexion: 5/5 Ankle plantarflexion: 5/5       Special Tests:   Left Right   Valgus Stress Test - -        Lachman's test - -   Posterior Lachman - -   Alisha's Test - +, mild                       Patellar Grind Test - +     Step down test: + (right knee valgus)  Lateral step down + (right knee valgus)    Function:    - SLS R: +  - Squat: +     Joint Mobility: bilateral lateral patellar tilt    Palpation: point tender to right knee (medial retinaculum), medial and anterior knee    Sensation: intact    Flexibility: bilateral HS    Edema:    Girth Measurement Joint line     Left 50 cm     Right 49 cm         CMS Impairment/Limitation/Restriction for FOTO Knee Survey  Status Limitation G-Code CMS Severity Modifier  Intake 48% 52% Current Status CK - At least 40 percent but less than 60 percent  Predicted 61% 39% Goal Status+ CJ - At least 20 percent but less than 40 percent       TREATMENT   Treatment Time In: 1400  Treatment Time Out: 1500  Total Treatment time separate from Evaluation: 10 minutes    Mindi received therapeutic exercises to develop strength, endurance, ROM, flexibility, posture and core stabilization  for 10 minutes including:  Quad sets  SLR  Clam shells with red band  HS stretch    Home Exercises and Patient Education Provided    Education provided:   - Yes    Written Home Exercises Provided: yes.  Exercises were reviewed and Mindi was able to demonstrate them prior to the end of the session.  Mindi demonstrated good  understanding of the education provided.     See EMR under Patient Instructions for exercises provided 6/13/2019.    Assessment   Mindi is a 18 y.o. female referred to outpatient Physical Therapy with a medical diagnosis of Right knee pain, Chronic instability of right knee. Pt presents with right knee pain, postural imbalance,      Problem List: pain, decreased ROM, decreased flexibility, decreased strength, decreased balance and stability, decreased motor control, antalgic gait and inability to participate fully in vocation pursuits.    Pt prognosis is Good.   Pt will benefit from skilled outpatient Physical Therapy to address the deficits stated above and in the chart below, provide pt/family education, and to maximize pt's level of independence.     Plan of care discussed with patient: Yes  Pt's spiritual, cultural and educational needs considered and patient is agreeable to the plan of care and goals as stated below:     Anticipated Barriers for therapy: none    Medical Necessity is demonstrated by the following  History  Co-morbidities and personal factors that may impact the plan of care Co-morbidities:   high BMI    Personal Factors:   no deficits     moderate   Examination  Body Structures and Functions, activity limitations and participation restrictions that may impact the plan of care Body Regions:   lower extremities    Body Systems:    ROM  strength  balance  gait  transfers  transitions  motor control    Participation Restrictions:   Home management  Community ambulation    Activity limitations:   Learning and applying knowledge  no deficits    General Tasks and  Commands  no deficits    Communication  no deficits    Mobility  lifting and carrying objects  walking    Self care  no deficits    Domestic Life  doing house work (cleaning house, washing dishes, laundry)    Interactions/Relationships  no deficits    Life Areas  no deficits    Community and Social Life  no deficits         high   Clinical Presentation stable and uncomplicated low   Decision Making/ Complexity Score: low     Short Term GOALS:  In 4 weeks, pt. will:  - improve right hip abductor/quad MMT 1/2 grade for ADL purposes.  - decrease outcome measure limitation to <50%  - demonstrate chair squat with good postural response for home management purposes.    Long Term GOALS:  In 8 weeks, pt. will:  - be independent and compliant with HEP and SX management   - decrease outcome measure limitation to <40%  - return to community ambulation independently with < 3/10 pain.  - demonstrate lateral step down with no valgus for neuromuscular purposes.    Plan   Plan of care Certification: 6/13/2019 to 08/09/2019.  Outpatient Physical Therapy 2 times weekly for 8 weeks to include the following interventions: Manual Therapy, Moist Heat/ Ice, Neuromuscular Re-ed, Patient Education, Therapeutic Activites and Therapeutic Exercise.      Mindi may at times be seen by a PTA as part of the Rehab Team.    Saad Cordon, PT

## 2019-07-03 ENCOUNTER — CLINICAL SUPPORT (OUTPATIENT)
Dept: REHABILITATION | Facility: HOSPITAL | Age: 19
End: 2019-07-03
Payer: MEDICAID

## 2019-07-03 DIAGNOSIS — M25.561 CHRONIC PAIN OF RIGHT KNEE: ICD-10-CM

## 2019-07-03 DIAGNOSIS — G89.29 CHRONIC PAIN OF RIGHT KNEE: ICD-10-CM

## 2019-07-03 DIAGNOSIS — R26.9 GAIT ABNORMALITY: ICD-10-CM

## 2019-07-03 PROCEDURE — 97110 THERAPEUTIC EXERCISES: CPT | Mod: PO | Performed by: PHYSICAL THERAPIST

## 2019-07-03 NOTE — PROGRESS NOTES
"  Physical Therapy Daily Treatment Note     Name: Mindi Tate  Clinic Number: [id number]    Therapy Diagnosis:   Encounter Diagnoses   Name Primary?    Chronic pain of right knee     Gait abnormality      Physician: Sudeep Javier MD    Visit Date: 7/3/2019  Medical Diagnosis from Referral: Right knee pain   Chronic instability of right knee      Evaluation Date: 6/13/2019  Authorization Period Expiration: 09/03/2019  Plan of Care Expiration: 08/09/2019  Visit # / Visits authorized: 2/ 20       Time In: 0800  Time Out: 0850  Total Billable Time: 45 minutes     Precautions: Standard    Subjective     Pt reports: being out of town due to family death. No new s/s..  She was not compliant with home exercise program.  Response to previous treatment: muscle soreness  Functional change: too soon to tell    Pain: 7/10  Location: right knee      Objective   Previous motion:  Step down test: + (right knee valgus)  Lateral step down + (right knee valgus)     Function:     - SLS R: +  - Squat: +      Joint Mobility: bilateral lateral patellar tilt     Palpation: point tender to right knee (medial retinaculum), medial and anterior knee    Mindi received therapeutic exercises to develop strength, endurance, ROM, flexibility, posture and core stabilization for 50 minutes including:  Supine: SLR 2# 3/10 each  Supine: TA with blue band 3/10  Supine: blue band, bridge 2/10  S/L clam shells: blue band 3/10 each  Manual: S/L hip flexor/quad stretch 3/0"    Hip walk: 12' x 4    Leg press:  DL 60# 2/10, progressed to SL 20# 2/10 each    Home Exercises Provided and Patient Education Provided     Education provided:   - Yes    Written Home Exercises Provided: Patient instructed to cont prior HEP.  Exercises were reviewed and Mindi was able to demonstrate them prior to the end of the session.  Mindi demonstrated good  understanding of the education provided.     See EMR under Patient Instructions for exercises provided prior " visit.    Assessment     Good tolerance with TE. Cueing on TE. No increase in s/s.  Right hip abductor weakness.    Mindi is progressing well towards her goals.   Pt prognosis is Good.     Pt will continue to benefit from skilled outpatient physical therapy to address the deficits listed in the problem list box on initial evaluation, provide pt/family education and to maximize pt's level of independence in the home and community environment.     Pt's spiritual, cultural and educational needs considered and pt agreeable to plan of care and goals.    Anticipated barriers to physical therapy: none    Goals:   Short Term GOALS:  In 4 weeks, pt. will:  - improve right hip abductor/quad MMT 1/2 grade for ADL purposes.(Progressing, not met)  - decrease outcome measure limitation to <50%(Progressing, not met)  - demonstrate chair squat with good postural response for home management purposes.(Progressing, not met)     Long Term GOALS:  In 8 weeks, pt. will:  - be independent and compliant with HEP and SX management.(Progressing, not met)  - decrease outcome measure limitation to <40%(Progressing, not met)  - return to community ambulation independently with < 3/10 pain.(Progressing, not met)  - demonstrate lateral step down with no valgus for neuromuscular purposes.(Progressing, not met)      Plan     Continue with POC.    Saad Cordon, PT

## 2019-07-08 ENCOUNTER — CLINICAL SUPPORT (OUTPATIENT)
Dept: REHABILITATION | Facility: HOSPITAL | Age: 19
End: 2019-07-08
Payer: MEDICAID

## 2019-07-08 DIAGNOSIS — R26.9 GAIT ABNORMALITY: ICD-10-CM

## 2019-07-08 DIAGNOSIS — G89.29 CHRONIC PAIN OF RIGHT KNEE: ICD-10-CM

## 2019-07-08 DIAGNOSIS — M25.561 CHRONIC PAIN OF RIGHT KNEE: ICD-10-CM

## 2019-07-08 PROCEDURE — 97110 THERAPEUTIC EXERCISES: CPT | Mod: PO | Performed by: PHYSICAL THERAPIST

## 2019-07-08 NOTE — PROGRESS NOTES
Physical Therapy Daily Treatment Note     Name: Mindi Tate  Clinic Number: [id number]    Therapy Diagnosis:   Encounter Diagnoses   Name Primary?    Chronic pain of right knee     Gait abnormality      Physician: Sudeep Javier MD    Visit Date: 7/8/2019  Medical Diagnosis from Referral: Right knee pain   Chronic instability of right knee      Evaluation Date: 6/13/2019  Authorization Period Expiration: 09/03/2019  Plan of Care Expiration: 08/09/2019  Visit # / Visits authorized: 3/ 20       Time In: 1000  Time Out: 1055  Total Billable Time: 30 minutes     Precautions: Standard    Subjective     Pt reports: having < pain to right knee with left knee pain noted. No falls noted.   She was not compliant with home exercise program.  Response to previous treatment: muscle soreness  Functional change: right knee moving more.    Pain: 7/10  Location: left knee, right knee 6/10.     Objective   Previous motion:  Step down test: + (right knee valgus)  Lateral step down + (right knee valgus)     Function:     - SLS R: +  - Squat: +      Joint Mobility: bilateral lateral patellar tilt     Palpation: point tender to right knee (medial retinaculum), medial and anterior knee    Mindi received therapeutic exercises to develop strength, endurance, ROM, flexibility, posture and core stabilization for 50 minutes including:(30 minutes one on one)    Supine: SLR 2# 3/10 each  Supine: TA with blue band 3/10  Supine: blue band, bridge 2/10  S/L clam shells: blue band 3/10 each    Hip walk: 12' x 4 with blue band    Leg press:  # 2/10, progressed to SL 60# 2/10 each    Home Exercises Provided and Patient Education Provided     Education provided:   - Yes    Written Home Exercises Provided: Patient instructed to cont prior HEP.  Exercises were reviewed and Mindi was able to demonstrate them prior to the end of the session.  Mindi demonstrated good  understanding of the education provided.     See EMR under Patient  Instructions for exercises provided prior visit.    Assessment   Increase closed chain strength noted. No increase in s/s.  Seated and standing activities noted with intermittent knee pain (L>R).    Mindi is progressing well towards her goals.   Pt prognosis is Good.     Pt will continue to benefit from skilled outpatient physical therapy to address the deficits listed in the problem list box on initial evaluation, provide pt/family education and to maximize pt's level of independence in the home and community environment.     Pt's spiritual, cultural and educational needs considered and pt agreeable to plan of care and goals.    Anticipated barriers to physical therapy: none    Goals:   Short Term GOALS:  In 4 weeks, pt. will:  - improve right hip abductor/quad MMT 1/2 grade for ADL purposes.(Progressing, not met)  - decrease outcome measure limitation to <50%(Progressing, not met)  - demonstrate chair squat with good postural response for home management purposes.(Progressing, not met)     Long Term GOALS:  In 8 weeks, pt. will:  - be independent and compliant with HEP and SX management.(Progressing, not met)  - decrease outcome measure limitation to <40%(Progressing, not met)  - return to community ambulation independently with < 3/10 pain.(Progressing, not met)  - demonstrate lateral step down with no valgus for neuromuscular purposes.(Progressing, not met)      Plan     Continue with POC.    Saad Cordon, PT

## 2019-09-04 PROBLEM — M25.561 CHRONIC PAIN OF RIGHT KNEE: Status: RESOLVED | Noted: 2019-06-13 | Resolved: 2019-09-04

## 2019-09-04 PROBLEM — G89.29 CHRONIC PAIN OF RIGHT KNEE: Status: RESOLVED | Noted: 2019-06-13 | Resolved: 2019-09-04

## 2019-09-04 PROBLEM — R26.9 GAIT ABNORMALITY: Status: RESOLVED | Noted: 2019-06-13 | Resolved: 2019-09-04

## 2019-11-06 PROBLEM — K85.90 ACUTE PANCREATITIS WITHOUT INFECTION OR NECROSIS: Status: ACTIVE | Noted: 2019-11-06

## 2019-11-12 PROBLEM — E66.812 CLASS 2 OBESITY IN ADULT: Status: ACTIVE | Noted: 2019-11-12

## 2019-11-12 PROBLEM — E66.9 CLASS 2 OBESITY IN ADULT: Status: ACTIVE | Noted: 2019-11-12

## 2019-11-25 VITALS
RESPIRATION RATE: 1 BRPM | SYSTOLIC BLOOD PRESSURE: 108 MMHG | TEMPERATURE: 98 F | SYSTOLIC BLOOD PRESSURE: 95 MMHG | DIASTOLIC BLOOD PRESSURE: 55 MMHG | RESPIRATION RATE: 16 BRPM | OXYGEN SATURATION: 100 % | HEART RATE: 62 BPM | DIASTOLIC BLOOD PRESSURE: 75 MMHG | TEMPERATURE: 98 F | OXYGEN SATURATION: 98 % | HEART RATE: 63 BPM

## 2019-11-25 VITALS — OXYGEN SATURATION: 100 % | HEART RATE: 73 BPM | RESPIRATION RATE: 18 BRPM | TEMPERATURE: 98 F

## 2019-11-25 PROBLEM — R11.10 INTRACTABLE VOMITING: Status: RESOLVED | Noted: 2019-11-25 | Resolved: 2019-11-25

## 2019-11-25 PROBLEM — R40.4 ALTERED AWARENESS, TRANSIENT: Status: ACTIVE | Noted: 2019-11-25

## 2019-11-25 PROBLEM — R11.10 INTRACTABLE VOMITING: Status: ACTIVE | Noted: 2019-11-25

## 2019-11-25 PROBLEM — R63.4 WEIGHT LOSS, UNINTENTIONAL: Status: ACTIVE | Noted: 2019-11-25

## 2019-11-25 PROBLEM — K85.90 ACUTE PANCREATITIS: Status: ACTIVE | Noted: 2019-11-25

## 2019-11-26 PROBLEM — E86.1 INTRAVASCULAR VOLUME DEPLETION: Status: ACTIVE | Noted: 2019-11-26

## 2019-11-26 PROBLEM — K82.8 GALLBLADDER SLUDGE: Status: ACTIVE | Noted: 2019-11-26

## 2019-11-28 VITALS
TEMPERATURE: 98 F | RESPIRATION RATE: 16 BRPM | SYSTOLIC BLOOD PRESSURE: 111 MMHG | DIASTOLIC BLOOD PRESSURE: 63 MMHG | OXYGEN SATURATION: 99 % | HEART RATE: 88 BPM

## 2019-11-29 VITALS
HEART RATE: 70 BPM | TEMPERATURE: 98 F | OXYGEN SATURATION: 100 % | SYSTOLIC BLOOD PRESSURE: 112 MMHG | DIASTOLIC BLOOD PRESSURE: 62 MMHG | RESPIRATION RATE: 14 BRPM

## 2019-11-29 VITALS
OXYGEN SATURATION: 97 % | SYSTOLIC BLOOD PRESSURE: 126 MMHG | RESPIRATION RATE: 18 BRPM | HEART RATE: 62 BPM | DIASTOLIC BLOOD PRESSURE: 71 MMHG | TEMPERATURE: 98 F

## 2019-11-29 VITALS
DIASTOLIC BLOOD PRESSURE: 48 MMHG | RESPIRATION RATE: 16 BRPM | OXYGEN SATURATION: 98 % | TEMPERATURE: 99 F | HEART RATE: 91 BPM | SYSTOLIC BLOOD PRESSURE: 107 MMHG

## 2019-11-30 VITALS
DIASTOLIC BLOOD PRESSURE: 56 MMHG | DIASTOLIC BLOOD PRESSURE: 65 MMHG | HEART RATE: 68 BPM | RESPIRATION RATE: 20 BRPM | SYSTOLIC BLOOD PRESSURE: 110 MMHG | OXYGEN SATURATION: 100 % | HEART RATE: 61 BPM | OXYGEN SATURATION: 99 % | TEMPERATURE: 98 F | RESPIRATION RATE: 15 BRPM | DIASTOLIC BLOOD PRESSURE: 66 MMHG | HEART RATE: 94 BPM | SYSTOLIC BLOOD PRESSURE: 112 MMHG | SYSTOLIC BLOOD PRESSURE: 120 MMHG

## 2019-11-30 VITALS
SYSTOLIC BLOOD PRESSURE: 91 MMHG | DIASTOLIC BLOOD PRESSURE: 36 MMHG | RESPIRATION RATE: 16 BRPM | OXYGEN SATURATION: 97 % | TEMPERATURE: 98 F | HEART RATE: 59 BPM

## 2019-11-30 VITALS
SYSTOLIC BLOOD PRESSURE: 92 MMHG | RESPIRATION RATE: 14 BRPM | OXYGEN SATURATION: 100 % | HEART RATE: 78 BPM | TEMPERATURE: 98 F | DIASTOLIC BLOOD PRESSURE: 21 MMHG

## 2019-11-30 VITALS
RESPIRATION RATE: 18 BRPM | SYSTOLIC BLOOD PRESSURE: 122 MMHG | TEMPERATURE: 98 F | DIASTOLIC BLOOD PRESSURE: 65 MMHG | HEART RATE: 82 BPM | OXYGEN SATURATION: 98 %

## 2019-11-30 VITALS
HEART RATE: 73 BPM | DIASTOLIC BLOOD PRESSURE: 52 MMHG | SYSTOLIC BLOOD PRESSURE: 105 MMHG | RESPIRATION RATE: 15 BRPM | OXYGEN SATURATION: 97 % | TEMPERATURE: 98 F

## 2019-12-01 VITALS
HEART RATE: 95 BPM | RESPIRATION RATE: 18 BRPM | DIASTOLIC BLOOD PRESSURE: 84 MMHG | TEMPERATURE: 98 F | OXYGEN SATURATION: 98 % | SYSTOLIC BLOOD PRESSURE: 112 MMHG

## 2019-12-01 VITALS — TEMPERATURE: 99 F | OXYGEN SATURATION: 95 % | RESPIRATION RATE: 16 BRPM | HEART RATE: 74 BPM

## 2019-12-01 VITALS
OXYGEN SATURATION: 100 % | OXYGEN SATURATION: 100 % | SYSTOLIC BLOOD PRESSURE: 94 MMHG | DIASTOLIC BLOOD PRESSURE: 54 MMHG | RESPIRATION RATE: 14 BRPM | DIASTOLIC BLOOD PRESSURE: 44 MMHG | HEART RATE: 72 BPM | TEMPERATURE: 97 F | HEART RATE: 58 BPM | RESPIRATION RATE: 18 BRPM | TEMPERATURE: 98 F | SYSTOLIC BLOOD PRESSURE: 110 MMHG

## 2019-12-01 VITALS
HEART RATE: 65 BPM | RESPIRATION RATE: 16 BRPM | TEMPERATURE: 98 F | OXYGEN SATURATION: 99 % | DIASTOLIC BLOOD PRESSURE: 48 MMHG | SYSTOLIC BLOOD PRESSURE: 99 MMHG

## 2019-12-01 VITALS
DIASTOLIC BLOOD PRESSURE: 65 MMHG | TEMPERATURE: 98 F | RESPIRATION RATE: 18 BRPM | HEART RATE: 75 BPM | SYSTOLIC BLOOD PRESSURE: 117 MMHG | OXYGEN SATURATION: 100 %

## 2019-12-01 PROBLEM — M54.30 SCIATICA: Status: ACTIVE | Noted: 2019-12-01

## 2019-12-02 VITALS
RESPIRATION RATE: 14 BRPM | DIASTOLIC BLOOD PRESSURE: 63 MMHG | TEMPERATURE: 98 F | SYSTOLIC BLOOD PRESSURE: 110 MMHG | HEART RATE: 78 BPM | OXYGEN SATURATION: 96 %

## 2019-12-02 VITALS
RESPIRATION RATE: 17 BRPM | HEART RATE: 78 BPM | TEMPERATURE: 98 F | SYSTOLIC BLOOD PRESSURE: 111 MMHG | OXYGEN SATURATION: 98 % | DIASTOLIC BLOOD PRESSURE: 59 MMHG

## 2019-12-02 VITALS
OXYGEN SATURATION: 100 % | HEART RATE: 95 BPM | SYSTOLIC BLOOD PRESSURE: 112 MMHG | TEMPERATURE: 98 F | RESPIRATION RATE: 16 BRPM | DIASTOLIC BLOOD PRESSURE: 65 MMHG

## 2019-12-03 VITALS
HEART RATE: 91 BPM | SYSTOLIC BLOOD PRESSURE: 145 MMHG | DIASTOLIC BLOOD PRESSURE: 77 MMHG | RESPIRATION RATE: 16 BRPM | OXYGEN SATURATION: 98 % | TEMPERATURE: 98 F

## 2019-12-03 VITALS
RESPIRATION RATE: 15 BRPM | HEART RATE: 85 BPM | SYSTOLIC BLOOD PRESSURE: 127 MMHG | TEMPERATURE: 99 F | OXYGEN SATURATION: 98 % | DIASTOLIC BLOOD PRESSURE: 74 MMHG

## 2019-12-03 VITALS
DIASTOLIC BLOOD PRESSURE: 62 MMHG | HEART RATE: 115 BPM | SYSTOLIC BLOOD PRESSURE: 98 MMHG | OXYGEN SATURATION: 99 % | TEMPERATURE: 99 F | RESPIRATION RATE: 16 BRPM

## 2019-12-03 VITALS
DIASTOLIC BLOOD PRESSURE: 79 MMHG | OXYGEN SATURATION: 100 % | HEART RATE: 87 BPM | RESPIRATION RATE: 14 BRPM | SYSTOLIC BLOOD PRESSURE: 111 MMHG | TEMPERATURE: 99 F

## 2019-12-04 VITALS
RESPIRATION RATE: 14 BRPM | OXYGEN SATURATION: 100 % | SYSTOLIC BLOOD PRESSURE: 102 MMHG | DIASTOLIC BLOOD PRESSURE: 60 MMHG | HEART RATE: 100 BPM | TEMPERATURE: 98 F

## 2019-12-04 VITALS
RESPIRATION RATE: 17 BRPM | OXYGEN SATURATION: 95 % | RESPIRATION RATE: 16 BRPM | DIASTOLIC BLOOD PRESSURE: 77 MMHG | HEART RATE: 104 BPM | SYSTOLIC BLOOD PRESSURE: 102 MMHG | OXYGEN SATURATION: 100 % | DIASTOLIC BLOOD PRESSURE: 66 MMHG | TEMPERATURE: 98 F | SYSTOLIC BLOOD PRESSURE: 98 MMHG | HEART RATE: 98 BPM

## 2019-12-04 PROBLEM — R20.0 LEFT LEG NUMBNESS: Status: ACTIVE | Noted: 2019-12-01

## 2019-12-07 PROBLEM — T76.21XA: Status: ACTIVE | Noted: 2019-12-07

## 2019-12-08 PROBLEM — Z63.9 FAMILY DYNAMICS PROBLEM: Status: ACTIVE | Noted: 2019-12-07

## 2020-02-14 ENCOUNTER — TELEPHONE (OUTPATIENT)
Dept: OBSTETRICS AND GYNECOLOGY | Facility: CLINIC | Age: 20
End: 2020-02-14

## 2020-02-14 NOTE — TELEPHONE ENCOUNTER
----- Message from Debbie Whitehead sent at 2/14/2020  2:01 PM CST -----  Contact: pt  Type: Needs Medical Advice    Who Called:  pt  Best Call Back Number: [phone] (home)   Additional Information: pt wants to know if her sister can take her medication, because she can not afford to get her own.pls call to advise

## 2020-02-14 NOTE — TELEPHONE ENCOUNTER
Spoke with Mindi states sister Keeley Tate is pregnant and her insurance not covering her PNV asking if she can share hers with Keeley--explained we do not recommend sharing any medications, informed will call pharmacy and have them fill PNV covered by insurance. Rx called to [facility] in [place],

## 2020-10-11 PROBLEM — K86.1 CHRONIC PANCREATITIS: Status: ACTIVE | Noted: 2020-10-11

## 2020-10-11 PROBLEM — K58.9 IRRITABLE BOWEL SYNDROME: Status: ACTIVE | Noted: 2020-10-11

## 2020-10-11 PROBLEM — R10.9 ABDOMINAL PAIN: Status: ACTIVE | Noted: 2020-10-11

## 2020-10-11 PROBLEM — K52.9 COLITIS: Status: ACTIVE | Noted: 2020-10-11

## 2020-10-11 PROBLEM — K37 APPENDICITIS: Status: ACTIVE | Noted: 2020-10-11

## 2020-10-20 PROBLEM — K52.9 INFLAMMATORY BOWEL DISEASE: Status: ACTIVE | Noted: 2020-10-20

## 2021-02-17 ENCOUNTER — TELEPHONE (OUTPATIENT)
Dept: OBSTETRICS AND GYNECOLOGY | Facility: CLINIC | Age: 21
End: 2021-02-17

## 2021-05-04 ENCOUNTER — TELEPHONE (OUTPATIENT)
Dept: GASTROENTEROLOGY | Facility: CLINIC | Age: 21
End: 2021-05-04

## 2021-05-04 PROBLEM — D64.9 CHRONIC ANEMIA: Status: ACTIVE | Noted: 2021-05-04

## 2021-05-04 PROBLEM — K50.90 CROHN'S DISEASE: Status: ACTIVE | Noted: 2021-05-04

## 2021-05-10 ENCOUNTER — PATIENT MESSAGE (OUTPATIENT)
Dept: RESEARCH | Facility: HOSPITAL | Age: 21
End: 2021-05-10

## 2021-10-06 PROBLEM — Z71.89 ACP (ADVANCE CARE PLANNING): Status: ACTIVE | Noted: 2021-10-06

## 2021-10-06 PROBLEM — K50.90 EXACERBATION OF CROHN'S DISEASE: Status: ACTIVE | Noted: 2021-10-06

## 2021-10-07 PROBLEM — K52.9 COLITIS: Status: ACTIVE | Noted: 2021-10-07

## 2021-10-08 PROBLEM — K50.10 CROHN'S COLITIS: Status: ACTIVE | Noted: 2021-10-08

## 2021-10-14 PROBLEM — K85.90 ACUTE PANCREATITIS: Status: RESOLVED | Noted: 2019-11-25 | Resolved: 2021-10-14

## 2021-10-25 ENCOUNTER — IMMUNIZATION (OUTPATIENT)
Dept: PRIMARY CARE CLINIC | Facility: CLINIC | Age: 21
End: 2021-10-25
Payer: MEDICAID

## 2021-10-25 DIAGNOSIS — Z23 NEED FOR VACCINATION: Primary | ICD-10-CM

## 2021-10-25 PROCEDURE — 91303 COVID-19,VECTOR-NR,RS-AD26,PF,0.5 ML DOSE VACCINE (JANSSEN): CPT | Mod: S$GLB,,, | Performed by: FAMILY MEDICINE

## 2021-10-25 PROCEDURE — 0031A COVID-19,VECTOR-NR,RS-AD26,PF,0.5 ML DOSE VACCINE (JANSSEN): CPT | Mod: S$GLB,,, | Performed by: FAMILY MEDICINE

## 2021-10-25 PROCEDURE — 0031A COVID-19,VECTOR-NR,RS-AD26,PF,0.5 ML DOSE VACCINE (JANSSEN): ICD-10-PCS | Mod: S$GLB,,, | Performed by: FAMILY MEDICINE

## 2021-10-25 PROCEDURE — 91303 COVID-19,VECTOR-NR,RS-AD26,PF,0.5 ML DOSE VACCINE (JANSSEN): ICD-10-PCS | Mod: S$GLB,,, | Performed by: FAMILY MEDICINE

## 2022-01-13 ENCOUNTER — TELEPHONE (OUTPATIENT)
Dept: GASTROENTEROLOGY | Facility: CLINIC | Age: 22
End: 2022-01-13
Payer: MEDICAID

## 2022-01-13 NOTE — TELEPHONE ENCOUNTER
Spoke with patient and completed NP Questionnaire.  Explained how new patient process and scheduling appointment will work.  Pt verbalized understanding to all and has no further questions at this time.

## 2022-01-14 RX ORDER — TRAMADOL HYDROCHLORIDE 50 MG/1
50 TABLET ORAL 3 TIMES DAILY PRN
COMMUNITY
Start: 2021-12-22 | End: 2022-09-15 | Stop reason: ALTCHOICE

## 2022-01-14 RX ORDER — PROCHLORPERAZINE MALEATE 5 MG
TABLET ORAL
COMMUNITY
Start: 2021-11-10 | End: 2022-09-15 | Stop reason: ALTCHOICE

## 2022-01-24 PROCEDURE — 99358 PR PROLONGED SERV,NO CONTACT,1ST HR: ICD-10-PCS | Mod: S$GLB,,, | Performed by: INTERNAL MEDICINE

## 2022-01-24 PROCEDURE — 99358 PROLONG SERVICE W/O CONTACT: CPT | Mod: S$GLB,,, | Performed by: INTERNAL MEDICINE

## 2022-01-25 ENCOUNTER — LAB VISIT (OUTPATIENT)
Dept: LAB | Facility: HOSPITAL | Age: 22
End: 2022-01-25
Attending: INTERNAL MEDICINE
Payer: MEDICAID

## 2022-01-25 ENCOUNTER — CLINICAL SUPPORT (OUTPATIENT)
Dept: GASTROENTEROLOGY | Facility: CLINIC | Age: 22
End: 2022-01-25
Payer: MEDICAID

## 2022-01-25 ENCOUNTER — OFFICE VISIT (OUTPATIENT)
Dept: GASTROENTEROLOGY | Facility: CLINIC | Age: 22
End: 2022-01-25
Payer: MEDICAID

## 2022-01-25 VITALS
SYSTOLIC BLOOD PRESSURE: 121 MMHG | HEART RATE: 90 BPM | TEMPERATURE: 98 F | DIASTOLIC BLOOD PRESSURE: 73 MMHG | WEIGHT: 217.81 LBS | BODY MASS INDEX: 31.7 KG/M2 | OXYGEN SATURATION: 98 %

## 2022-01-25 DIAGNOSIS — K50.813 CROHN'S DISEASE OF BOTH SMALL AND LARGE INTESTINE WITH FISTULA: Primary | ICD-10-CM

## 2022-01-25 DIAGNOSIS — K50.813 CROHN'S DISEASE OF BOTH SMALL AND LARGE INTESTINE WITH FISTULA: ICD-10-CM

## 2022-01-25 LAB
ALBUMIN SERPL BCP-MCNC: 3.4 G/DL (ref 3.5–5.2)
ALP SERPL-CCNC: 67 U/L (ref 55–135)
ALT SERPL W/O P-5'-P-CCNC: 9 U/L (ref 10–44)
ANION GAP SERPL CALC-SCNC: 11 MMOL/L (ref 8–16)
AST SERPL-CCNC: 11 U/L (ref 10–40)
BASOPHILS # BLD AUTO: 0.03 K/UL (ref 0–0.2)
BASOPHILS NFR BLD: 0.3 % (ref 0–1.9)
BILIRUB SERPL-MCNC: 0.4 MG/DL (ref 0.1–1)
BUN SERPL-MCNC: 9 MG/DL (ref 6–20)
CALCIUM SERPL-MCNC: 9.7 MG/DL (ref 8.7–10.5)
CHLORIDE SERPL-SCNC: 104 MMOL/L (ref 95–110)
CO2 SERPL-SCNC: 23 MMOL/L (ref 23–29)
CREAT SERPL-MCNC: 0.7 MG/DL (ref 0.5–1.4)
CRP SERPL-MCNC: 100.9 MG/L (ref 0–8.2)
DIFFERENTIAL METHOD: ABNORMAL
EOSINOPHIL # BLD AUTO: 0.2 K/UL (ref 0–0.5)
EOSINOPHIL NFR BLD: 1.4 % (ref 0–8)
ERYTHROCYTE [DISTWIDTH] IN BLOOD BY AUTOMATED COUNT: 18.6 % (ref 11.5–14.5)
EST. GFR  (AFRICAN AMERICAN): >60 ML/MIN/1.73 M^2
EST. GFR  (NON AFRICAN AMERICAN): >60 ML/MIN/1.73 M^2
GLUCOSE SERPL-MCNC: 95 MG/DL (ref 70–110)
HCT VFR BLD AUTO: 32.7 % (ref 37–48.5)
HGB BLD-MCNC: 9.6 G/DL (ref 12–16)
IMM GRANULOCYTES # BLD AUTO: 0.04 K/UL (ref 0–0.04)
IMM GRANULOCYTES NFR BLD AUTO: 0.4 % (ref 0–0.5)
LYMPHOCYTES # BLD AUTO: 1.6 K/UL (ref 1–4.8)
LYMPHOCYTES NFR BLD: 15.2 % (ref 18–48)
MCH RBC QN AUTO: 20.6 PG (ref 27–31)
MCHC RBC AUTO-ENTMCNC: 29.4 G/DL (ref 32–36)
MCV RBC AUTO: 70 FL (ref 82–98)
MONOCYTES # BLD AUTO: 0.9 K/UL (ref 0.3–1)
MONOCYTES NFR BLD: 8.5 % (ref 4–15)
NEUTROPHILS # BLD AUTO: 7.9 K/UL (ref 1.8–7.7)
NEUTROPHILS NFR BLD: 74.2 % (ref 38–73)
NRBC BLD-RTO: 0 /100 WBC
PLATELET # BLD AUTO: 619 K/UL (ref 150–450)
PMV BLD AUTO: 10.1 FL (ref 9.2–12.9)
POTASSIUM SERPL-SCNC: 4.7 MMOL/L (ref 3.5–5.1)
PROT SERPL-MCNC: 8.6 G/DL (ref 6–8.4)
RBC # BLD AUTO: 4.66 M/UL (ref 4–5.4)
SODIUM SERPL-SCNC: 138 MMOL/L (ref 136–145)
VIT B12 SERPL-MCNC: 578 PG/ML (ref 210–950)
WBC # BLD AUTO: 10.7 K/UL (ref 3.9–12.7)

## 2022-01-25 PROCEDURE — 99215 PR OFFICE/OUTPT VISIT, EST, LEVL V, 40-54 MIN: ICD-10-PCS | Mod: S$GLB,,, | Performed by: INTERNAL MEDICINE

## 2022-01-25 PROCEDURE — 82607 VITAMIN B-12: CPT | Performed by: INTERNAL MEDICINE

## 2022-01-25 PROCEDURE — 1159F MED LIST DOCD IN RCRD: CPT | Mod: CPTII,S$GLB,, | Performed by: INTERNAL MEDICINE

## 2022-01-25 PROCEDURE — 3078F PR MOST RECENT DIASTOLIC BLOOD PRESSURE < 80 MM HG: ICD-10-PCS | Mod: CPTII,S$GLB,, | Performed by: INTERNAL MEDICINE

## 2022-01-25 PROCEDURE — 99215 OFFICE O/P EST HI 40 MIN: CPT | Mod: S$GLB,,, | Performed by: INTERNAL MEDICINE

## 2022-01-25 PROCEDURE — 3074F SYST BP LT 130 MM HG: CPT | Mod: CPTII,S$GLB,, | Performed by: INTERNAL MEDICINE

## 2022-01-25 PROCEDURE — 80053 COMPREHEN METABOLIC PANEL: CPT | Performed by: INTERNAL MEDICINE

## 2022-01-25 PROCEDURE — 82657 ENZYME CELL ACTIVITY: CPT | Performed by: INTERNAL MEDICINE

## 2022-01-25 PROCEDURE — 86140 C-REACTIVE PROTEIN: CPT | Performed by: INTERNAL MEDICINE

## 2022-01-25 PROCEDURE — 1159F PR MEDICATION LIST DOCUMENTED IN MEDICAL RECORD: ICD-10-PCS | Mod: CPTII,S$GLB,, | Performed by: INTERNAL MEDICINE

## 2022-01-25 PROCEDURE — 36415 COLL VENOUS BLD VENIPUNCTURE: CPT | Performed by: INTERNAL MEDICINE

## 2022-01-25 PROCEDURE — 3078F DIAST BP <80 MM HG: CPT | Mod: CPTII,S$GLB,, | Performed by: INTERNAL MEDICINE

## 2022-01-25 PROCEDURE — 3008F BODY MASS INDEX DOCD: CPT | Mod: CPTII,S$GLB,, | Performed by: INTERNAL MEDICINE

## 2022-01-25 PROCEDURE — 3074F PR MOST RECENT SYSTOLIC BLOOD PRESSURE < 130 MM HG: ICD-10-PCS | Mod: CPTII,S$GLB,, | Performed by: INTERNAL MEDICINE

## 2022-01-25 PROCEDURE — 85025 COMPLETE CBC W/AUTO DIFF WBC: CPT | Performed by: INTERNAL MEDICINE

## 2022-01-25 PROCEDURE — 3008F PR BODY MASS INDEX (BMI) DOCUMENTED: ICD-10-PCS | Mod: CPTII,S$GLB,, | Performed by: INTERNAL MEDICINE

## 2022-01-25 RX ORDER — DIPHENHYDRAMINE HYDROCHLORIDE 50 MG/ML
25 INJECTION INTRAMUSCULAR; INTRAVENOUS
Status: CANCELLED | OUTPATIENT
Start: 2022-01-25

## 2022-01-25 RX ORDER — IPRATROPIUM BROMIDE AND ALBUTEROL SULFATE 2.5; .5 MG/3ML; MG/3ML
3 SOLUTION RESPIRATORY (INHALATION)
Status: CANCELLED | OUTPATIENT
Start: 2022-01-25

## 2022-01-25 RX ORDER — HEPARIN 100 UNIT/ML
500 SYRINGE INTRAVENOUS
Status: CANCELLED | OUTPATIENT
Start: 2022-01-25

## 2022-01-25 RX ORDER — EPINEPHRINE 1 MG/ML
0.3 INJECTION, SOLUTION, CONCENTRATE INTRAVENOUS
Status: CANCELLED | OUTPATIENT
Start: 2022-01-25

## 2022-01-25 RX ORDER — METHYLPREDNISOLONE SOD SUCC 125 MG
40 VIAL (EA) INJECTION
Status: CANCELLED | OUTPATIENT
Start: 2022-01-25

## 2022-01-25 RX ORDER — BUDESONIDE 9 MG/1
9 TABLET, FILM COATED, EXTENDED RELEASE ORAL DAILY
Qty: 42 EACH | Refills: 0 | Status: SHIPPED | OUTPATIENT
Start: 2022-01-25 | End: 2022-03-08

## 2022-01-25 RX ORDER — USTEKINUMAB 90 MG/ML
90 INJECTION, SOLUTION SUBCUTANEOUS
Qty: 1 ML | Refills: 2 | Status: SHIPPED | OUTPATIENT
Start: 2022-01-25 | End: 2022-08-30 | Stop reason: SDUPTHER

## 2022-01-25 RX ORDER — ACETAMINOPHEN 325 MG/1
650 TABLET ORAL
Status: CANCELLED | OUTPATIENT
Start: 2022-01-25

## 2022-01-25 RX ORDER — SODIUM CHLORIDE 0.9 % (FLUSH) 0.9 %
10 SYRINGE (ML) INJECTION
Status: CANCELLED | OUTPATIENT
Start: 2022-01-25

## 2022-01-25 NOTE — PROGRESS NOTES
I spent 30 minutes on 1/24/2022  reviewing Mindi Tate prior records prior to our clinic visit on 01/25/2022       Answers for HPI/ROS submitted by the patient on 1/24/2022  rash: Yes  abdominal pain: Yes  nausea: Yes  vomiting: Yes  Feeling depressed?: Yes  heartburn: Yes  Joint pain? : Yes  back pain: Yes

## 2022-01-25 NOTE — PROGRESS NOTES
[facility]  Inflammatory Bowel Disease  Pharmacy Note    TODAY'S VISIT DATE:  1/25/2022    Reason for visit: Education     IBD treatment to be initiated/optimized: New start Stelara     IBD MD: Carlos      Pertinent History:  - IBD phenotype: Crohn's disease with fistula  - Dispensing pharmacy/infusion center:  [facility] / [facility]  - Current therapy: none    Therapeutic Drug Monitoring Labs:  N/A    Prior IBD Therapies:  Infliximab - first infusion 12/1 second 12/15 - d/c due to left sided muscle weakness and numbness.   Prednisone       Vaccinations:  Lab Results   Component Value Date    HEPBSAB <3.10 10/13/2021     No results found for: HEPBSURFABQU  No results found for: HEPAIGG  No results found for: VARICELLAZOS, VARICELLAINT  Immunization History   Administered Date(s) Administered    COVID-19, vector-nr, rS-Ad26, PF (Ashley) 10/25/2021    DTaP 01/12/1999, 02/22/1999, 05/26/1999, 01/11/2000, 09/25/2002    HIB 01/12/1999, 02/22/1999, 05/26/1999, 01/11/2000    Hepatitis B 1998, 1998, 08/25/1999    Hib-HbOC 01/11/2000    IPV 01/12/1999, 02/22/1999, 12/08/1999, 09/25/2002    Influenza 10/28/2011    Influenza (Flumist) - Quadrivalent - Intranasal *Preferred* (2-49 years old) 12/07/2015    Influenza - Quadrivalent - PF *Preferred* (6 months and older) 01/06/2019    Influenza - Trivalent (ADULT) 11/20/2002, 11/04/2003, 01/05/2005    Influenza - Trivalent - PF (ADULT) 11/20/2002, 11/04/2003, 01/05/2005, 10/28/2011    MMR 01/11/2000, 09/25/2002    Meningococcal Conjugate (MCV4P) 01/26/2010, 04/21/2015    OPV 12/08/1999    Rho (D) Immune Globulin 11/16/2018    Rho (D) Immune Globulin - IM 08/02/2018, 01/05/2019    Tdap 03/24/2010, 01/06/2019    Varicella 12/08/1999, 01/26/2010     Influenza:  Recommended today   PCV 13: recommend at next visit  PPSV 23: 2-12 months after PCV13   Tetanus (TdaP): 2029  HPV: discuss at next visit       Meningococcal:  Hepatitis B:  Check  immunity   Hepatitis A:  Check immunity   MMR (live vaccine): check immunity        Chickenpox status/Varicella (live vaccine): check immunity   Shingrix: recommend at next visit   Covid:  Booster recommended       All Medical History/Surgical History/Family History/Social History/Allergies have been reviewed and updated in EMR    Review of patient's allergies indicates:  No Known Allergies      Current Medications:   No outpatient medications have been marked as taking for the 1/25/22 encounter (Clinical Support) with IBD PHARMACIST.       Reviewed all current medications including OTC, herbals and supplements.    Labs:   Lab Results   Component Value Date    HEPBSAG Negative 10/13/2021     No results found for: TBGOLDPLUS  Lab Results   Component Value Date    QDQYRKAA01 517 04/02/2019     Lab Results   Component Value Date    WBC 17.82 (H) 10/15/2021    HGB 8.3 (L) 10/15/2021    HCT 27.8 (L) 10/15/2021    MCV 67 (L) 10/15/2021     10/15/2021     Lab Results   Component Value Date    CREATININE 0.65 10/15/2021    ALBUMIN 2.9 (L) 10/15/2021    BILITOT <0.1 (A) 10/15/2021    ALKPHOS 107 10/15/2021    AST 36 10/15/2021    ALT 34 10/15/2021         Assessment/Plan:  Mindi Tate is a 21 y.o. female that  has a past medical history of ADHD (attention deficit hyperactivity disorder), Anemia, Anemia, Anxiety, Depression, H/O seasonal allergies, and Pancreatitis. Patient presents to clinic for an initial new patient visit with Dr. Gonzalez. She was referred to see IBD pharmacist to discuss treatment options after failing remicade. Patient reports starting remicade infusions on 12/1. About one week after her first infusion she developed numbness and weakness on her left side of the body. Symptoms worsened after the second infusion. She could not feel two of her fingers on the left side and numbness was consistent. Remicade was discontinued after the second infusion and patient was started on prednisone for 2 weeks.  Since then patient reports improvement of the symptoms. However, still complains of occasional numbness on her fingers. Patient denies using any other agents in the past. She is uncomfortable today during her visit with the main complaint being abdominal pain.     # Recommendations:  - Educated patient on all the currently approved medications for Crohn's disease including steroids, 5-ASA, immunomodulators and biologics  - Discussed in depth the differences between TNF inhibitors, integrin inhibitors and IL inhibitors  - Considering her history of side effects with infliximab, TNF inhibitors might not be ideal at this time  - Explained to the patient that even though entyvio is a safe agent considering she has fistulizing disease stelara will be best option in her case   - Reviewed in depth the mechanism of action, common and serious side effects, route of administration and frequency of stelara  - Encouraged pt to practice proper hand hygiene considering increased risk of infection with biologics. Pt to make us aware if she ever experiences s/sx of an infection including fever, chills, URI symptoms or UTI symptoms.   - Discussed importance of immunizations considering the increased risk of infections. Recommended patient gets the flu vaccine at her local pharmacy today or this week. Will discuss vaccines in details at next visit.   - Pt to avoid live vaccines and uncooked/ raw seafood such as raw oysters or sushi.   - Advised pt to use sun protection and get annual skin exams considering possible increased risk of skin cancer   - Printed information handed to patient at the end of the visit  - Patient verbalized understanding instructions   - Therapy plan sent to [facility] and Rx pended for provider to be sent to [facility]  - Will follow up with patient for in person injection training visit for the first SC stelara injection.      Lisset Arana, PharmD  IBD Clinical Pharmacist

## 2022-01-25 NOTE — PROGRESS NOTES
[facility]  Inflammatory Bowel Disease   Clinic Note              Patient Name: Mindi Tate  Age: 21 y.o.  Sex: female  MRN: [id number]    TODAY'S DATE:  2022  LAST CLINIC DATE: Visit date not found     Diagnosis: Crohn's disease of the small bowel and colon    HPI:  Mindi Tate is a 21 y.o. female with a history of schizoaffective disorder on wellbutrin, fistulizing crohn's ileocolitis who presents to [facility] care.    For review, patient was diagnosed with crohn's disease in  after being seen at [facility] for abdominal pain and blood per rectum.    Symptoms began about 2-3 months after birth of her daughter () in 2019, with 5-10 liquid BMs/day.  Does not recall having bleeding.  Remained symptomatic without care for about 9 months until being seen in the ER in  for abdominal pain which based off of imaging findings was thought to be secondary to pancreatitis at the time.  Of note, her diagnostic evaluation was still ongoing at that time, and she had had two colonoscopies over the previous 4 months, per chart incomplete given poor prep.   During that hospitalization colonoscopy was done showing only mild colitis in cecum.   Her diagnosis remained unclear, and she continued to be seen as an outpatient and treated for IBS-D.    More recently, in May of 2021 she was admitted with worsening abdominal pain, diarrhea, nausea and some rectal bleeding.  After endoscopy was done showing severe colitis, she was started on infliximab, and received two infusions at [facility] on [address].  She notes doing fine with her first infusion, and about 1 week later feeling left sided numbness/tingling, without weakness.  After her second infusion numbness progressed from mostly left upper extremity to also lower extremity, relating to similar sensation of sitting on the toilet for too long.  Last on prednisone while admitted with a short taper, and she has been off of  prednisone for about 2 mo.  Lives in [place] [facility].      At present, she is having persistent nausea and abdominal pain, with only some relief with prochlorper and tramadol.  Pain is worse with eating.  Typically has 5-10 BMs/day, with 2-3 nocturnal BMs.   She has noticed occasional rashes on her legs, which are nonpruritic macules which have been improving with mupirocin.     IBD Details:  Dx Date:  Formally diagnosed ; symptoms since   Disease type/distribution:  Small bowel and colon, possibly fistulizing    Disease Complications:    Extraintestinal manifestations:    Prior surgeries:      -2019 -    -2019 - cholecystectomy    Current Treatment:  none   Start Date:  --        Last dose: -- Next Dose: --  Response:  --   Optimized:  --  Adverse reactions:  --  Therapeutic Drug Monitoring: --    Prior treatments:              Steroids:  multiple courses  5ASA:  no  IMM:    no  TNF Inh:     - Remicade; received two infusion (Dec 15 2021, Dec 31 2021), stopped due to right sided numbness/weakness after second infusion              Anti-Integrin:  no              IL 12/23:  no  DUSTIN Inh:  no     Previous Clinical Trials:  no    CRP Elevation:  6.6 during previous admission calprotectin:  1030 10/13/21    Imaging:              MRE:  10/12/21 - Impression:     1. Extensive wall thickening in the cecum/terminal ileum with surrounding inflammation.  There appears to be a fistulous connection between a no other loop of small bowel and the cecum just distal to the terminal ileum.  No definite stricture or abrupt caliber changes identified and there are no frankly dilated bowel loops.  Extensive lymphadenopathy is also present a right lower quadrant.  Given the patient's history, this likely represents a Crohn's exacerbation with active inflammation and fistula formation, however given the mass effect and lymphadenopathy, underlying lesion is not entirely excluded.                CT:   --              Other:  --    Last Colonoscopy:    Colonoscopy 10/07/21 - Inflammation characterized by erosions, friability and granularity was found in a continuous and circumferential pattern from the splenic flexure to the ascending coon.  The splenic flexure and the mid ascenind colon were spared. This was moderate in severity.  A benign-appearing intrinsic severe setnosis was found in the mid ascending colon and was non-traversed  -Path: transverse colon - moderate chronic active inflammatory bowel disease      Colonoscopy, 10/12/21 - Findings:        numerous pseudopolyps erythema started at 65 cm just beyond splenic        flex? few polypoid erythematous lesions w surface ulcers this became        more intense 75 80 cm more pseudopolyps deep blind pouches ?        fistulae could not find lumen dw radiologist mri? refused gg enema        yesterday , difficultt to explain what a drastic change since min        non active colitis in cecum last year , unclear what was done when        went to [facility] last yr will need to get records tatoo of normal mucosa        distal to inflammation      Other Endoscopies:    EGD 05/05/21 - normal esophagus.  Patchy mild inflammation found in the stomach. Biopsied. Normal duodenum.  -Path: focally active marked chronic gastritis positive for H. Pylori      Colonoscopy 10/15/2020 -      A localized area of severely congested, erythematous, eroded        (linear-pattern) and ulcerated mucosa was found in the cecum.        Biopsies were taken with a cold forceps for histology.   -Path: CECUM, BIOPSY:  CHRONIC INACTIVE COLITIS.  RIGHT COLON, BIOPSY:  COLONIC MUCOSA WITH FOCAL EROSION.     Comment:   The patient's history of inflammatory bowel disease is noted. All    biopsies are negative for dysplasia and granulomas.     ROS: Negative other than above      Review of patient's allergies indicates:  No Known Allergies    Outpatient Medications Marked as Taking for the 1/25/22 encounter  (Office Visit) with Nba Gonzalez MD   Medication Sig Dispense Refill    prochlorperazine (COMPAZINE) 5 MG tablet Take by mouth.      traMADoL (ULTRAM) 50 mg tablet Take 50 mg by mouth 3 (three) times daily as needed.         Past Medical History:   Diagnosis Date    ADHD (attention deficit hyperactivity disorder)     Anemia     Anemia     Anxiety     Depression     H/O seasonal allergies     Pancreatitis 2019       Past Surgical History:   Procedure Laterality Date    ADENOIDECTOMY       SECTION Left 2019    Procedure:  SECTION;  Surgeon: Robbie Jaramillo MD;  Location: [facility] L&D;  Service: OB/GYN;  Laterality: Left;     SECTION      COLONOSCOPY N/A 10/15/2020    Procedure: COLONOSCOPY;  Surgeon: Olegario Lozano MD;  Location: [facility] ENDO;  Service: Endoscopy;  Laterality: N/A;    COLONOSCOPY N/A 10/7/2021    Procedure: COLONOSCOPY- unable to reach cecum -poor prep;  Surgeon: Dre Hubbard MD;  Location: [facility] ENDO;  Service: Endoscopy;  Laterality: N/A;    COLONOSCOPY N/A 10/12/2021    Procedure: COLONOSCOPY- no cecum -poor prep;  Surgeon: Olegario Lozano MD;  Location: [facility];  Service: Endoscopy;  Laterality: N/A;    ESOPHAGOGASTRODUODENOSCOPY N/A 2021    Procedure: EGD (ESOPHAGOGASTRODUODENOSCOPY);  Surgeon: Emanuel Jackson MD;  Location: [facility];  Service: Endoscopy;  Laterality: N/A;    LAPAROSCOPIC CHOLECYSTECTOMY N/A 2019    Procedure: CHOLECYSTECTOMY, LAPAROSCOPIC;  Surgeon: Jewel Cutler MD;  Location: [facility];  Service: General;  Laterality: N/A;    TONSILLECTOMY         Family History   Problem Relation Age of Onset    Breast cancer Paternal Aunt     Multiple sclerosis Father     Seizures Father     Asthma Sister     Crohn's disease Brother     Ovarian cancer Neg Hx        Social History     Socioeconomic History    Marital status: Single   Tobacco Use    Smoking status: Never Smoker    Smokeless tobacco:  Never Used   Substance and Sexual Activity    Alcohol use: Yes     Comment: on occasion     Drug use: No    Sexual activity: Not Currently     Partners: Male     Birth control/protection: Condom         Vital Signs:  /73 (BP Location: Left arm, Patient Position: Sitting)   Pulse 90   Temp 97.8 °F (36.6 °C)   Wt 98.8 kg (217 lb 13 oz)   SpO2 98%   BMI 31.70 kg/m²      General: Awoke and orientedx3, in no acute distress  HEENT: Normocephalic, atruamatic, Moist mucous membranes  CV: Regular rate and rhythm, no JVD  Pulm: Normal inspiratory effort, no audible wheezing  Abdomen: Soft, nontender, nondistended abdomen without rebound or guarding  Extremities: No clubbing, cyanosis or edema  Psych: Normal affect. Good eye contact     Labs:   Lab Results   Component Value Date    CRP 8.50 (H) 10/06/2021    CALPROTECTIN 2030 (H) 10/13/2021     Lab Results   Component Value Date    HEPBSAG Negative 10/13/2021     No results found for: TBGOLDPLUS  Lab Results   Component Value Date    QHEOZXHX79 517 04/02/2019     Lab Results   Component Value Date    WBC 17.82 (H) 10/15/2021    HGB 8.3 (L) 10/15/2021    HCT 27.8 (L) 10/15/2021    MCV 67 (L) 10/15/2021     10/15/2021     Lab Results   Component Value Date    CREATININE 0.65 10/15/2021    ALBUMIN 2.9 (L) 10/15/2021    BILITOT <0.1 (A) 10/15/2021    ALKPHOS 107 10/15/2021    AST 36 10/15/2021    ALT 34 10/15/2021       Assessment/Plan:  Mindi Tate is a 21 y.o. female with a history of schizoaffective disorder on wellbutrin, fistulizing crohn's ileocolitis who presents to establish care.    # Crohn's disease of both small and large intestine with fistula [K50.813]    Symptoms started in January of 2019 postpartum, however only formally diagnosed in the past 3 months at [facility].  She appears to have severe ileocolitis, possibly with fistulizing disease, and was started on infliximab.  Unfortunately after 2 doses in December she sounds to have  had an infusion reaction with numbness/tingling, which has mostly resolved after holding for a month.    Today, I first explained the diagnosis of IBD, focusing on Crohn's disease, it is epidemiology, pathogenesis and treatment modalities. I pointed her towards [facility]'s [facility] as a resource for information.  We then discussed that based off of serologies, imaging and endoscopy she has severe Crohn's disease of the small bowel and colon.  Typically we would continue with anti TNF, however with concern for infusion reaction affecting nervous system with numbness/tingling we will opt for a different mechanism of action.  With concern for penetrating disease I feel that Stelara would be of better benefit than Entyvio, and we will begin authorization process to begin Stelara.  With psychiatric history I would like to avoid corticosteroids, so we will provide with Uceris 9 mg daily while we wait for approval.    -- Labs today: CBC, CMP, CRP, VitB12, TPMT  -- Begin Uceris 9mg daily  -- We will begin approval process for stelara  -- Endoscopy: CRC screening q1-2 years beginning 8 years after diagnosis (2017).  Discussed that we will likely repeat endoscopy 6 months after beginning therapy pending clinical course.    RTC 1-2 mo    Thank you for involving us in the care of this patient.        Nba Gonzalez MD  Department of Gastroenterology  Inflammatory Bowel Disease      Orders Placed This Encounter   Procedures    CBC Auto Differential     Standing Status:   Future     Standing Expiration Date:   3/25/2023    Comprehensive Metabolic Panel     Standing Status:   Future     Standing Expiration Date:   3/25/2023    C-Reactive Protein     Standing Status:   Future     Standing Expiration Date:   3/25/2023    Vitamin B12     Standing Status:   Future     Standing Expiration Date:   3/25/2023    TPMT Activity Profile, RBC     Standing Status:   Future     Standing Expiration Date:   3/26/2023         Answers  for HPI/ROS submitted by the patient on 1/24/2022  rash: Yes  abdominal pain: Yes  nausea: Yes  vomiting: Yes  Feeling depressed?: Yes  heartburn: Yes  Joint pain? : Yes  back pain: Yes

## 2022-01-25 NOTE — PATIENT INSTRUCTIONS
Ustekinumab (Stelara)    Class: Interleukin 12 and Interleukin 23 Blocker - Biologic product    Mechanism of Action: blocks the p40 protein subunit used by both the interleukin (IL)-12 and IL-23 cytokines. IL-12 and IL-23 are naturally occurring cytokines that are involved in inflammatory and immune responses and play a role in the inflammatory process for Inflammatory Bowel Disease (IBD).    Dosage/ Route/ Frequency: One intravenous (IV) infusion and then inject under the skin in the subcutaneous tissue every 8 weeks.  - Loading Dose: Initial 1 hour IV infusion based on your weight at an infusion clinic   - Maintenance Dose: 90 mg SC every 8 weeks that can be done at home (first injection dose is 8 weeks after initial infusion)    Side effects  Please notify us if you are treated with antibiotics or experience signs of infection (ie. fevers, chills, sores, etc) given we may need to hold your medication during this time.     Common side effects (?3% or 3 in 100 people): vomiting (with the first infusion), nasopharyngitis (sore throat, runny nose), injection site redness, vaginal infection, urinary tract infection     Please call us immediately if you develop any of the below problems:    Serious side effects:     Serious infections: This medication may increase your risk of developing viral, bacterial and/or fungal infections.     Allergic or Hypersensitivity reaction- hives (rash), difficulty breathing, chest pain/tightness, high or low blood pressure, swelling of the face and hands, fever or chills    Malignancies: This medication may increase your risk of skin cancer, yearly skin checks are recommended. Make sure you are using sun block and protective wear.    Posterior reversible encephalopathy syndrome (PRES) has been reported. Symptoms of PRES include confusion, headache, imaging changes, seizure, and visual disturbances; most cases occur within a few days to several months after initiation of therapy but may  occur ?1 year. Monitor patients closely; discontinue therapy immediately if symptoms occur and administer appropriate therapy.    Lung inflammation: post marketing rare side effect reported. Happens within the first three doses of the medication and it can improve once the medication is stopped. Symptoms include shortness of breath or cough that do not go away.     Labs/Monitoring:  - Prior to starting this new agent, we will be checking labs to determine the safety of taking this medication including baseline blood counts, liver and kidney function tests, TB test and viral hepatitis testing.   - Once started on the therapy we will monitor your blood count and your liver and kidney function tests as needed following evidence-based guidelines. TB test and viral hepatitis tests will be repeated every year. If you have any risk of exposure or travel to high-risk areas please notify us so we can do this testing sooner. If indicated your provider may also choose to check drug levels to monitor or optimize your response to the medication.   - We recommend you do not start the home injection on a Sunday for lab purposes.     Storage recommendations:  - Keep refrigerated in temperature between 36°F to 46°F (2°C to 8°C).  - If necessary, can be in room temperature (86°F or 30°C) for max of 30 days in the original carton protected from light  - If taken out of the fridge and left in room temperature do not put back in the fridge   - Do NOT shake and do NOT freeze     Vaccine counseling:   Avoid live vaccines which include:  - Intranasal Influenza LAIV4 (FluMist Quadrivalent)  - Measles, Mumps, Rubella (MMR)  - Rotavirus (RotaTeq, Rotarix)  - Typhoid oral capsule (Vivotif)  - Varicella (Varivax)  - Smallpox (Vaccinia)  - Yellow Fever (YF-Vax)  - Adenovirus  - Cholera (Vaxchora)    Avoid consumption of raw seafood such as oysters/sushi.

## 2022-01-26 ENCOUNTER — TELEPHONE (OUTPATIENT)
Dept: PHARMACY | Facility: CLINIC | Age: 22
End: 2022-01-26
Payer: MEDICAID

## 2022-01-27 RX ORDER — PREDNISONE 10 MG/1
40 TABLET ORAL DAILY
Qty: 120 TABLET | Refills: 1 | Status: SHIPPED | OUTPATIENT
Start: 2022-01-27 | End: 2022-04-07

## 2022-01-27 RX ORDER — BALSALAZIDE DISODIUM 750 MG/1
2250 CAPSULE ORAL 2 TIMES DAILY
Qty: 180 CAPSULE | Refills: 11 | Status: SHIPPED | OUTPATIENT
Start: 2022-01-27 | End: 2022-09-15 | Stop reason: ALTCHOICE

## 2022-01-27 NOTE — TELEPHONE ENCOUNTER
"Spoke to pt and let her know "if she would like to "try" and "fail" two medications so that we can use budesonide?  The trial can be very brief (I.e. one day), and we can prescribe a week of prednisone to bridge her while she is trialing and failing these meds." she said that is fine and to send the script to the [facility] in [place]    "

## 2022-02-01 ENCOUNTER — SPECIALTY PHARMACY (OUTPATIENT)
Dept: PHARMACY | Facility: CLINIC | Age: 22
End: 2022-02-01
Payer: MEDICAID

## 2022-02-01 ENCOUNTER — INFUSION (OUTPATIENT)
Dept: INFUSION THERAPY | Facility: HOSPITAL | Age: 22
End: 2022-02-01
Attending: INTERNAL MEDICINE
Payer: MEDICAID

## 2022-02-01 VITALS
SYSTOLIC BLOOD PRESSURE: 115 MMHG | TEMPERATURE: 98 F | HEIGHT: 70 IN | BODY MASS INDEX: 31.7 KG/M2 | DIASTOLIC BLOOD PRESSURE: 65 MMHG | HEART RATE: 69 BPM

## 2022-02-01 DIAGNOSIS — K50.813 CROHN'S DISEASE OF BOTH SMALL AND LARGE INTESTINE WITH FISTULA: Primary | ICD-10-CM

## 2022-02-01 PROCEDURE — 96365 THER/PROPH/DIAG IV INF INIT: CPT | Mod: PN

## 2022-02-01 PROCEDURE — 25000003 PHARM REV CODE 250: Mod: PN | Performed by: INTERNAL MEDICINE

## 2022-02-01 PROCEDURE — 63600175 PHARM REV CODE 636 W HCPCS: Mod: JG,PN | Performed by: INTERNAL MEDICINE

## 2022-02-01 RX ORDER — DIPHENHYDRAMINE HYDROCHLORIDE 50 MG/ML
25 INJECTION INTRAMUSCULAR; INTRAVENOUS
Status: CANCELLED | OUTPATIENT
Start: 2022-02-01

## 2022-02-01 RX ORDER — METHYLPREDNISOLONE SOD SUCC 125 MG
40 VIAL (EA) INJECTION
Status: CANCELLED | OUTPATIENT
Start: 2022-02-01

## 2022-02-01 RX ORDER — EPINEPHRINE 1 MG/ML
0.3 INJECTION, SOLUTION, CONCENTRATE INTRAVENOUS
Status: CANCELLED | OUTPATIENT
Start: 2022-02-01

## 2022-02-01 RX ORDER — SODIUM CHLORIDE 0.9 % (FLUSH) 0.9 %
10 SYRINGE (ML) INJECTION
Status: DISCONTINUED | OUTPATIENT
Start: 2022-02-01 | End: 2022-02-01 | Stop reason: HOSPADM

## 2022-02-01 RX ORDER — ACETAMINOPHEN 325 MG/1
650 TABLET ORAL
Status: CANCELLED | OUTPATIENT
Start: 2022-02-01

## 2022-02-01 RX ORDER — HEPARIN 100 UNIT/ML
500 SYRINGE INTRAVENOUS
Status: ACTIVE | OUTPATIENT
Start: 2022-02-01 | End: 2022-02-01

## 2022-02-01 RX ORDER — IPRATROPIUM BROMIDE AND ALBUTEROL SULFATE 2.5; .5 MG/3ML; MG/3ML
3 SOLUTION RESPIRATORY (INHALATION)
Status: CANCELLED | OUTPATIENT
Start: 2022-02-01

## 2022-02-01 RX ORDER — HEPARIN 100 UNIT/ML
500 SYRINGE INTRAVENOUS
Status: CANCELLED | OUTPATIENT
Start: 2022-02-01

## 2022-02-01 RX ORDER — SODIUM CHLORIDE 0.9 % (FLUSH) 0.9 %
10 SYRINGE (ML) INJECTION
Status: CANCELLED | OUTPATIENT
Start: 2022-02-01

## 2022-02-01 RX ADMIN — USTEKINUMAB 520 MG: 130 SOLUTION INTRAVENOUS at 02:02

## 2022-02-01 RX ADMIN — SODIUM CHLORIDE: 0.9 INJECTION, SOLUTION INTRAVENOUS at 02:02

## 2022-02-01 NOTE — PLAN OF CARE
Problem: Adult Inpatient Plan of Care  Goal: Patient-Specific Goal (Individualized)  Outcome: Ongoing, Progressing  Flowsheets (Taken 2/1/2022 1317)  Anxieties, Fears or Concerns: none  Individualized Care Needs: recliner  Patient-Specific Goals (Include Timeframe): no s/s of rx during tx

## 2022-02-01 NOTE — PLAN OF CARE
Problem: Adult Inpatient Plan of Care  Goal: Plan of Care Review  Outcome: Ongoing, Progressing  Flowsheets (Taken 2/1/2022 1606)  Plan of Care Reviewed With: patient   Pt tolerated stelara infusion well.  No adverse reaction noted.   Pt difficult IV stick but tolerated IV start well.  IV flushed with NS and D/C per protocol.  Patient left clinic in no acute distress.

## 2022-02-03 LAB
6-METHYLMERCAPTOPURINE RIBOSIDE: 5.34 NMOL/ML/H (ref 5.04–9.57)
6-METHYLMERCAPTOPURINE: 3.08 NMOL/ML/H (ref 3–6.66)
6-METHYLTHIOGUANINE RIBOSIDE: 3.63 NMOL/ML/H (ref 2.7–5.84)
TPMT INTERPRETATION: NORMAL
TPMT REVIEWED BY: NORMAL

## 2022-02-09 NOTE — TELEPHONE ENCOUNTER
Stelara subq PA submitted via [facility] - (Key: [id number])  [id number]. Stelara approved 8/10/2022. $0 copay. No BI required for Medicaid. Forwarding to initial consult.     Pt received IV dose 2/1, due to start subq 3/29.  Spoke to pt and informed her. She is agreeable to call back 1 week before she is due to start subq dose for consult/delivery.

## 2022-03-09 ENCOUNTER — OFFICE VISIT (OUTPATIENT)
Dept: GASTROENTEROLOGY | Facility: CLINIC | Age: 22
End: 2022-03-09
Payer: MEDICAID

## 2022-03-09 ENCOUNTER — LAB VISIT (OUTPATIENT)
Dept: LAB | Facility: HOSPITAL | Age: 22
End: 2022-03-09
Attending: INTERNAL MEDICINE
Payer: MEDICAID

## 2022-03-09 VITALS
TEMPERATURE: 98 F | RESPIRATION RATE: 14 BRPM | BODY MASS INDEX: 33.5 KG/M2 | SYSTOLIC BLOOD PRESSURE: 113 MMHG | OXYGEN SATURATION: 100 % | HEART RATE: 60 BPM | WEIGHT: 230.19 LBS | DIASTOLIC BLOOD PRESSURE: 73 MMHG

## 2022-03-09 DIAGNOSIS — K50.813 CROHN'S DISEASE OF BOTH SMALL AND LARGE INTESTINE WITH FISTULA: Primary | ICD-10-CM

## 2022-03-09 DIAGNOSIS — K50.813 CROHN'S DISEASE OF BOTH SMALL AND LARGE INTESTINE WITH FISTULA: ICD-10-CM

## 2022-03-09 LAB
ALBUMIN SERPL BCP-MCNC: 3.7 G/DL (ref 3.5–5.2)
ALP SERPL-CCNC: 55 U/L (ref 55–135)
ALT SERPL W/O P-5'-P-CCNC: 13 U/L (ref 10–44)
ANION GAP SERPL CALC-SCNC: 10 MMOL/L (ref 8–16)
AST SERPL-CCNC: 10 U/L (ref 10–40)
BASOPHILS # BLD AUTO: 0.01 K/UL (ref 0–0.2)
BASOPHILS NFR BLD: 0.1 % (ref 0–1.9)
BILIRUB SERPL-MCNC: 0.4 MG/DL (ref 0.1–1)
BUN SERPL-MCNC: 13 MG/DL (ref 6–20)
CALCIUM SERPL-MCNC: 9.2 MG/DL (ref 8.7–10.5)
CHLORIDE SERPL-SCNC: 103 MMOL/L (ref 95–110)
CO2 SERPL-SCNC: 27 MMOL/L (ref 23–29)
CREAT SERPL-MCNC: 0.9 MG/DL (ref 0.5–1.4)
CRP SERPL-MCNC: 1.6 MG/L (ref 0–8.2)
DIFFERENTIAL METHOD: ABNORMAL
EOSINOPHIL # BLD AUTO: 0 K/UL (ref 0–0.5)
EOSINOPHIL NFR BLD: 0.1 % (ref 0–8)
ERYTHROCYTE [DISTWIDTH] IN BLOOD BY AUTOMATED COUNT: 27 % (ref 11.5–14.5)
EST. GFR  (AFRICAN AMERICAN): >60 ML/MIN/1.73 M^2
EST. GFR  (NON AFRICAN AMERICAN): >60 ML/MIN/1.73 M^2
GLUCOSE SERPL-MCNC: 97 MG/DL (ref 70–110)
HCT VFR BLD AUTO: 32.5 % (ref 37–48.5)
HGB BLD-MCNC: 9.4 G/DL (ref 12–16)
IMM GRANULOCYTES # BLD AUTO: 0.09 K/UL (ref 0–0.04)
IMM GRANULOCYTES NFR BLD AUTO: 0.7 % (ref 0–0.5)
LYMPHOCYTES # BLD AUTO: 1.3 K/UL (ref 1–4.8)
LYMPHOCYTES NFR BLD: 10.6 % (ref 18–48)
MCH RBC QN AUTO: 21.5 PG (ref 27–31)
MCHC RBC AUTO-ENTMCNC: 28.9 G/DL (ref 32–36)
MCV RBC AUTO: 74 FL (ref 82–98)
MONOCYTES # BLD AUTO: 0.4 K/UL (ref 0.3–1)
MONOCYTES NFR BLD: 3.3 % (ref 4–15)
NEUTROPHILS # BLD AUTO: 10.4 K/UL (ref 1.8–7.7)
NEUTROPHILS NFR BLD: 85.2 % (ref 38–73)
NRBC BLD-RTO: 0 /100 WBC
PLATELET # BLD AUTO: 565 K/UL (ref 150–450)
PMV BLD AUTO: 10.2 FL (ref 9.2–12.9)
POTASSIUM SERPL-SCNC: 4.2 MMOL/L (ref 3.5–5.1)
PROT SERPL-MCNC: 7.2 G/DL (ref 6–8.4)
RBC # BLD AUTO: 4.38 M/UL (ref 4–5.4)
SODIUM SERPL-SCNC: 140 MMOL/L (ref 136–145)
WBC # BLD AUTO: 12.21 K/UL (ref 3.9–12.7)

## 2022-03-09 PROCEDURE — 1159F PR MEDICATION LIST DOCUMENTED IN MEDICAL RECORD: ICD-10-PCS | Mod: CPTII,S$GLB,, | Performed by: INTERNAL MEDICINE

## 2022-03-09 PROCEDURE — 85025 COMPLETE CBC W/AUTO DIFF WBC: CPT | Performed by: INTERNAL MEDICINE

## 2022-03-09 PROCEDURE — 3008F PR BODY MASS INDEX (BMI) DOCUMENTED: ICD-10-PCS | Mod: CPTII,S$GLB,, | Performed by: INTERNAL MEDICINE

## 2022-03-09 PROCEDURE — 99214 PR OFFICE/OUTPT VISIT, EST, LEVL IV, 30-39 MIN: ICD-10-PCS | Mod: S$GLB,,, | Performed by: INTERNAL MEDICINE

## 2022-03-09 PROCEDURE — 3078F DIAST BP <80 MM HG: CPT | Mod: CPTII,S$GLB,, | Performed by: INTERNAL MEDICINE

## 2022-03-09 PROCEDURE — 3008F BODY MASS INDEX DOCD: CPT | Mod: CPTII,S$GLB,, | Performed by: INTERNAL MEDICINE

## 2022-03-09 PROCEDURE — 3074F SYST BP LT 130 MM HG: CPT | Mod: CPTII,S$GLB,, | Performed by: INTERNAL MEDICINE

## 2022-03-09 PROCEDURE — 80053 COMPREHEN METABOLIC PANEL: CPT | Performed by: INTERNAL MEDICINE

## 2022-03-09 PROCEDURE — 3074F PR MOST RECENT SYSTOLIC BLOOD PRESSURE < 130 MM HG: ICD-10-PCS | Mod: CPTII,S$GLB,, | Performed by: INTERNAL MEDICINE

## 2022-03-09 PROCEDURE — 1159F MED LIST DOCD IN RCRD: CPT | Mod: CPTII,S$GLB,, | Performed by: INTERNAL MEDICINE

## 2022-03-09 PROCEDURE — 99214 OFFICE O/P EST MOD 30 MIN: CPT | Mod: S$GLB,,, | Performed by: INTERNAL MEDICINE

## 2022-03-09 PROCEDURE — 86140 C-REACTIVE PROTEIN: CPT | Performed by: INTERNAL MEDICINE

## 2022-03-09 PROCEDURE — 3078F PR MOST RECENT DIASTOLIC BLOOD PRESSURE < 80 MM HG: ICD-10-PCS | Mod: CPTII,S$GLB,, | Performed by: INTERNAL MEDICINE

## 2022-03-09 PROCEDURE — 36415 COLL VENOUS BLD VENIPUNCTURE: CPT | Performed by: INTERNAL MEDICINE

## 2022-03-09 NOTE — PROGRESS NOTES
[facility]  Inflammatory Bowel Disease   Clinic Note              Patient Name: Mindi Tate  Age: 21 y.o.  Sex: female  MRN: [id number]    TODAY'S DATE:  3/9/2022  LAST CLINIC DATE: 2022      Diagnosis: Crohn's disease of the small bowel and colon    HPI:  Mindi Tate is a 21 y.o. female with a history of schizoaffective disorder on wellbutrin, fistulizing crohn's ileocolitis who was seen for followup.    For review, patient was diagnosed with crohn's disease in  after being seen at [facility] for abdominal pain and blood per rectum.    Symptoms began about 2-3 months after birth of her daughter () in 2019, with 5-10 liquid BMs/day.  Does not recall having bleeding.  Remained symptomatic without care for about 9 months until being seen in the ER in  for abdominal pain which based off of imaging findings was thought to be secondary to pancreatitis at the time.  Of note, her diagnostic evaluation was still ongoing at that time, and she had had two colonoscopies over the previous 4 months, per chart incomplete given poor prep.   During that hospitalization colonoscopy was done showing only mild colitis in cecum.   Her diagnosis remained unclear, and she continued to be seen as an outpatient and treated for IBS-D.    More recently, in May of 2021 she was admitted with worsening abdominal pain, diarrhea, nausea and some rectal bleeding.  After endoscopy was done showing severe colitis, she was started on infliximab, and received two infusions at [place] on [address].  She notes doing fine with her first infusion, and about 1 week later feeling left sided numbness/tingling, without weakness.  After her second infusion numbness progressed from mostly left upper extremity to also lower extremity, relating to similar sensation of sitting on the toilet for too long.  Last on prednisone while admitted with a short taper, and she has been off of prednisone for about  2 mo.  Lives in [facility].      When initially seen in clinic, she was having persistent nausea and abdominal pain, with only some relief with prochlorper and tramadol.  Pain was worse with eating.  Typically was having 5-10 BMs/day, with 2-3 nocturnal BMs.   Decision was made to begin stelara    Interim history:  Since establishing care in January, patient had her loading infusion of stelara on .  She remains on an unknown dose of prednisone, possibly 30mg daily, which she believes she has been on since October, but also unclear on timeline.   Does not have stelara pen at home, believes her next injection is next week (03/15).  Symptom wise, pain is markedly improved.  She does not feel that bowel frequency has improved much, still having 6-7 BMs/day.    IBD Details:  Dx Date:  Formally diagnosed ; symptoms since   Disease type/distribution:  Small bowel and colon, possibly fistulizing    Disease Complications:    Extraintestinal manifestations:    Prior surgeries:      -2019 -    -2019 - cholecystectomy    Current Treatment:  stelara 90mg q8week  Start Date:  2022       Last dose: 2022 Next Dose: 03/15/22  Response:  --   Optimized:  --  Adverse reactions:  --  Therapeutic Drug Monitoring: --    Prior treatments:              Steroids:  multiple courses  5ASA:  no  IMM:    no  TNF Inh:     - Remicade; received two infusion (Dec 15 2021, Dec 31 2021), stopped due to right sided numbness/weakness after second infusion              Anti-Integrin:  no              IL 12/23:  currently  DUSTIN Inh:  no     Previous Clinical Trials:  no    CRP Elevation:  6.6 during previous admission, 100.9 when established in clinic  calprotectin:  1030 10/13/21    Imaging:              MRE:  10/12/21 - Impression:     1. Extensive wall thickening in the cecum/terminal ileum with surrounding inflammation.  There appears to be a fistulous connection between a no other  loop of small bowel and the cecum just distal to the terminal ileum.  No definite stricture or abrupt caliber changes identified and there are no frankly dilated bowel loops.  Extensive lymphadenopathy is also present a right lower quadrant.  Given the patient's history, this likely represents a Crohn's exacerbation with active inflammation and fistula formation, however given the mass effect and lymphadenopathy, underlying lesion is not entirely excluded.                CT:  --              Other:  --    Last Colonoscopy:    Colonoscopy 10/07/21 - Inflammation characterized by erosions, friability and granularity was found in a continuous and circumferential pattern from the splenic flexure to the ascending coon.  The splenic flexure and the mid ascenind colon were spared. This was moderate in severity.  A benign-appearing intrinsic severe setnosis was found in the mid ascending colon and was non-traversed  -Path: transverse colon - moderate chronic active inflammatory bowel disease      Colonoscopy, 10/12/21 - Findings:        numerous pseudopolyps erythema started at 65 cm just beyond splenic        flex? few polypoid erythematous lesions w surface ulcers this became        more intense 75 80 cm more pseudopolyps deep blind pouches ?        fistulae could not find lumen dw radiologist mri? refused gg enema        yesterday , difficultt to explain what a drastic change since min        non active colitis in cecum last year , unclear what was done when        went to [facility] last yr will need to get records tatoo of normal mucosa        distal to inflammation      Other Endoscopies:    EGD 05/05/21 - normal esophagus.  Patchy mild inflammation found in the stomach. Biopsied. Normal duodenum.  -Path: focally active marked chronic gastritis positive for H. Pylori      Colonoscopy 10/15/2020 -      A localized area of severely congested, erythematous, eroded        (linear-pattern) and ulcerated mucosa was found in the  cecum.        Biopsies were taken with a cold forceps for histology.   -Path: CECUM, BIOPSY:  CHRONIC INACTIVE COLITIS.  RIGHT COLON, BIOPSY:  COLONIC MUCOSA WITH FOCAL EROSION.     Comment:   The patient's history of inflammatory bowel disease is noted. All    biopsies are negative for dysplasia and granulomas.     ROS: Negative other than above      Review of patient's allergies indicates:  No Known Allergies    Outpatient Medications Marked as Taking for the 3/9/22 encounter (Office Visit) with Nba Gonzalez MD   Medication Sig Dispense Refill    acetaminophen (TYLENOL) 500 MG tablet Take 1,000 mg by mouth as needed for Pain.      balsalazide (COLAZAL) 750 mg capsule Take 3 capsules (2,250 mg total) by mouth 2 (two) times a day. 180 capsule 11    cyclobenzaprine (FLEXERIL) 5 MG tablet Take 1 tablet by mouth nightly as needed for Muscle spasms.      LIDOcaine (LIDODERM) 5 % Place 1 patch onto the skin daily as needed (pain).       predniSONE (DELTASONE) 10 MG tablet Take 4 tablets (40 mg total) by mouth once daily. 120 tablet 1    prochlorperazine (COMPAZINE) 5 MG tablet Take by mouth.      propranoloL (INDERAL) 20 MG tablet Take 20 mg by mouth 3 (three) times daily as needed (high blood pressure).       traMADoL (ULTRAM) 50 mg tablet Take 50 mg by mouth 3 (three) times daily as needed.      ustekinumab (STELARA) 90 mg/mL Syrg syringe Inject 1 mL (90 mg total) into the skin every 8 weeks. 1 each 2       Past Medical History:   Diagnosis Date    ADHD (attention deficit hyperactivity disorder)     Anemia     Anemia     Anxiety     Depression     H/O seasonal allergies     Pancreatitis 2019       Past Surgical History:   Procedure Laterality Date    ADENOIDECTOMY       SECTION Left 2019    Procedure:  SECTION;  Surgeon: Robbie Jaramillo MD;  Location: [facility] L&D;  Service: OB/GYN;  Laterality: Left;     SECTION      COLONOSCOPY N/A 10/15/2020    Procedure:  COLONOSCOPY;  Surgeon: Olegario Lozano MD;  Location: [facility] ENDO;  Service: Endoscopy;  Laterality: N/A;    COLONOSCOPY N/A 10/7/2021    Procedure: COLONOSCOPY- unable to reach cecum -poor prep;  Surgeon: Dre Hubbard MD;  Location: [facility] ENDO;  Service: Endoscopy;  Laterality: N/A;    COLONOSCOPY N/A 10/12/2021    Procedure: COLONOSCOPY- no cecum -poor prep;  Surgeon: Olegario Lozano MD;  Location: [facility] ENDO;  Service: Endoscopy;  Laterality: N/A;    ESOPHAGOGASTRODUODENOSCOPY N/A 5/5/2021    Procedure: EGD (ESOPHAGOGASTRODUODENOSCOPY);  Surgeon: Emanuel Jackson MD;  Location: [facility] ENDO;  Service: Endoscopy;  Laterality: N/A;    LAPAROSCOPIC CHOLECYSTECTOMY N/A 11/29/2019    Procedure: CHOLECYSTECTOMY, LAPAROSCOPIC;  Surgeon: Jewel Cutler MD;  Location: [facility] OR;  Service: General;  Laterality: N/A;    TONSILLECTOMY  2011    UPPER GASTROINTESTINAL ENDOSCOPY         Family History   Problem Relation Age of Onset    Breast cancer Paternal Aunt     Multiple sclerosis Father     Seizures Father     Asthma Sister     Crohn's disease Brother     Ovarian cancer Neg Hx        Social History     Socioeconomic History    Marital status: Single   Tobacco Use    Smoking status: Never Smoker    Smokeless tobacco: Never Used   Substance and Sexual Activity    Alcohol use: Yes     Comment: socially    Drug use: No    Sexual activity: Not Currently     Partners: Male     Birth control/protection: Condom         Vital Signs:  /73 (BP Location: Right arm, Patient Position: Sitting, BP Method: Small (Automatic))   Pulse 60   Temp 98.2 °F (36.8 °C) (Temporal)   Resp 14   Wt 104.4 kg (230 lb 2.6 oz)   SpO2 100%   BMI 33.50 kg/m²      General: Awoke and orientedx3, in no acute distress  HEENT: Normocephalic, atruamatic, Moist mucous membranes  CV: Regular rate and rhythm, no JVD  Pulm: Normal inspiratory effort, no audible wheezing  Abdomen: Soft, nontender, nondistended abdomen without  rebound or guarding  Extremities: No clubbing, cyanosis or edema  Psych: Normal affect. Good eye contact     Labs:   Lab Results   Component Value Date    .9 (H) 01/25/2022    CALPROTECTIN 2030 (H) 10/13/2021     Lab Results   Component Value Date    HEPBSAG Negative 10/13/2021     No results found for: TBGOLDPLUS  Lab Results   Component Value Date    WCGZZUSL72 578 01/25/2022     Lab Results   Component Value Date    WBC 10.70 01/25/2022    HGB 9.6 (L) 01/25/2022    HCT 32.7 (L) 01/25/2022    MCV 70 (L) 01/25/2022     (H) 01/25/2022     Lab Results   Component Value Date    CREATININE 0.7 01/25/2022    ALBUMIN 3.4 (L) 01/25/2022    BILITOT 0.4 01/25/2022    ALKPHOS 67 01/25/2022    AST 11 01/25/2022    ALT 9 (L) 01/25/2022       Assessment/Plan:  Mindi Tate is a 21 y.o. female with a history of schizoaffective disorder on wellbutrin, fistulizing crohn's ileocolitis on stelara who was seen for followup    # Crohn's disease of both small and large intestine with fistula [K50.813]    Symptoms started in January of 2019 postpartum, however only formally diagnosed in the past 3 months at [facility].  She appears to have severe ileocolitis, possibly with fistulizing disease, and was started on infliximab.  Unfortunately after 2 doses in December she sounds to have had an infusion reaction with numbness/tingling, which has mostly resolved after holding for a month.  As such we decided to proceed with stelara, which she received loading of in February and is due for first injection this month.  She sounds to have had some response to loading infusion alone (although also on prednisone and uceris inadvertently).     -- Labs today: CBC, CMP, CRP  -- taper prednisone to 20mg x1 week 10mgx1 week.  -- Stop Uceris 9mg daily  -- Continue stelara 90mg l2moncp; will have patient return to [facility] 03/29 for injection training with pharmacist  -- Endoscopy: CRC screening q1-2 years beginning 8 years after  diagnosis (2017).  Discussed that we will likely repeat endoscopy 6 months after beginning therapy pending clinical course.    RTC 3 mo    Thank you for involving us in the care of this patient.        Nba Gonzalez MD  Department of Gastroenterology  Inflammatory Bowel Disease        Answers for HPI/ROS submitted by the patient on 3/8/2022  abdominal pain: Yes  back pain: Yes

## 2022-03-09 NOTE — PATIENT INSTRUCTIONS
STOP budesonide (ucerirs)  Begin to taper prednisone (2 pills a day for 1 week, 1 pill a day for 1 week)  Stelara injection next week

## 2022-03-22 ENCOUNTER — SPECIALTY PHARMACY (OUTPATIENT)
Dept: PHARMACY | Facility: CLINIC | Age: 22
End: 2022-03-22
Payer: MEDICAID

## 2022-03-22 DIAGNOSIS — K50.813 CROHN'S DISEASE OF BOTH SMALL AND LARGE INTESTINE WITH FISTULA: Primary | ICD-10-CM

## 2022-03-22 NOTE — TELEPHONE ENCOUNTER
Specialty Pharmacy - Initial Clinical Assessment    Specialty Medication Orders Linked to Encounter    Flowsheet Row Most Recent Value   Medication #1 ustekinumab (STELARA) 90 mg/mL Syrg syringe (Order#[id number], Rx#[id number])        Patient Diagnosis   K50.813 - Crohn's disease of both small and large intestine with fistula    Subjective    Mindi Tate is a 21 y.o. female, who is followed by the specialty pharmacy service for management and education.    Recent Encounters     Date Type Provider Description    03/22/2022 Specialty Pharmacy Vida Rosenthal, Chel Initial Clinical Assessment    02/01/2022 Specialty Pharmacy Reji Friedman, Chel Referral Authorization        Clinical call attempts since last clinical assessment   3/21/2022  4:29 PM - Specialty Pharmacy - Clinical Assessment by Vida Rosenthal PharmD     Current Outpatient Medications   Medication Sig    acetaminophen (TYLENOL) 500 MG tablet Take 1,000 mg by mouth as needed for Pain.    balsalazide (COLAZAL) 750 mg capsule Take 3 capsules (2,250 mg total) by mouth 2 (two) times a day.    cyclobenzaprine (FLEXERIL) 5 MG tablet Take 1 tablet by mouth nightly as needed for Muscle spasms.    LIDOcaine (LIDODERM) 5 % Place 1 patch onto the skin daily as needed (pain).     lisdexamfetamine (VYVANSE) 70 MG capsule Take 70 mg by mouth every morning.    predniSONE (DELTASONE) 10 MG tablet Take 4 tablets (40 mg total) by mouth once daily.    prochlorperazine (COMPAZINE) 5 MG tablet Take by mouth.    propranoloL (INDERAL) 20 MG tablet Take 20 mg by mouth 3 (three) times daily as needed (high blood pressure).     traMADoL (ULTRAM) 50 mg tablet Take 50 mg by mouth 3 (three) times daily as needed.    ustekinumab (STELARA) 90 mg/mL Syrg syringe Inject 1 mL (90 mg total) into the skin every 8 weeks.   Last reviewed on 3/9/2022  9:53 AM by Magaly Babcock LPN    Review of patient's allergies indicates:  No Known AllergiesLast reviewed on  3/9/2022  10:03 AM by Magaly Babcock          Assessment Questions - Documented Responses    Flowsheet Row Most Recent Value   Assessment    Goals of Therapy Status Discussed (new start)   Status of the patients ability to self-administer: Is Able   All education points have been covered with patient? No, patient declined- printed education provided   Welcome packet contents reviewed and discussed with patient? Yes   Assesment completed? Yes   Plan Therapy continued   Do you need to open a clinical intervention (i-vent)? No   Do you want to schedule first shipment? Yes   Medication #1 Assessment Info    Patient status Existing medication, New to [facility]   Is this medication appropriate for the patient? Yes        Refill Questions - Documented Responses    Flowsheet Row Most Recent Value   Patient Availability and HIPAA Verification    Does patient want to proceed with activity? Yes   HIPAA/medical authority confirmed? Yes   Relationship to patient of person spoken to? Self   Refill Screening Questions    When does the patient need to receive the medication? 03/29/22   Refill Delivery Questions    How will the patient receive the medication? Delivery Merary   When does the patient need to receive the medication? 03/29/22   Shipping Address Home   Address in [facility] confirmed and updated if neccessary? Yes   Expected Copay ($) 0   Is the patient able to afford the medication copay? Yes   Payment Method zero copay   Days supply of Refill 56   Supplies needed? No supplies needed   Refill activity completed? Yes   Refill activity plan Refill scheduled   Shipment/Pickup Date: 03/23/22          Objective    She has a past medical history of ADHD (attention deficit hyperactivity disorder), Anemia, Anemia, Anxiety, Depression, H/O seasonal allergies, and Pancreatitis (11/13/2019).    BP Readings from Last 4 Encounters:   03/09/22 113/73   02/01/22 115/65   01/25/22 121/73   10/15/21 106/64     Ht Readings from Last 4  "Encounters:   02/01/22 5' 9.5" (1.765 m)   10/06/21 5' 9.5" (1.765 m)   05/03/21 5' 9.5" (1.765 m)   04/17/21 5' 9.5" (1.765 m)     Wt Readings from Last 4 Encounters:   03/09/22 104.4 kg (230 lb 2.6 oz)   01/25/22 98.8 kg (217 lb 13 oz)   10/06/21 87.5 kg (193 lb)   05/03/21 93.8 kg (206 lb 12.7 oz)     Recent Labs   Lab Result Units 03/09/22  1115 01/25/22  1018   Creatinine mg/dL 0.9 0.7   ALT U/L 13 9 L   AST U/L 10 11     The goals of prescribed drug therapy management include:  · Supporting patient to meet the prescriber's medical treatment objectives  · Improving or maintaining quality of life  · Maintaining optimal therapy adherence  · Minimizing and managing side effects      Goals of Therapy Status: Discussed (new start)    Assessment/Plan  Patient plans to start therapy on 03/29/22      Indication, dosage, appropriateness, effectiveness, safety and convenience of her specialty medication(s) were reviewed today.     Patient Education   Pharmacist offer to  patient was declined. Printed educational materials will be provided with medication.  Patient did accept verbal education on the following topics:  · Side effects, including prevention, minimization, and management  · Storage, safe handling, and disposal    Stelara subq consult. Pt declined full consult, she plans to present to MD office on 3/29 for in-person injection training. Pt stated she's a visual learner and wants to wait until appt to review injection training. Advised pt to watch injection video online prior to her appt. Pt received IV dose 2/1, due to start subq 3/29. Advised pt to use a calendar to keep track of injection days. Dosage, storage, and administration reviewed with pt. Infection precautions reviewed with pt. Counseled pt to hold dose if ill. Common side effects reviewed with pt. Pt reported no side effects from IV Stelara dose. Reiterated importance of keeping lab appts. Pt verbalized understanding. [facility] refill process and " services explained to pt. [facility] will call for refills. Pt verbalized understanding. Pt had no further questions or concerns and was encouraged to call [facility] should any arise.      Tasks added this encounter   5/14/2022 - Refill Call (Auto Added)  12/22/2022 - Clinical - Follow Up Assesement (Annual)   Tasks due within next 3 months   No tasks due.     Vida Rosenthal, PharmD  [facility]  [address]  [facility] [address]  Phone: [phone]  Fax: [phone]

## 2022-03-29 ENCOUNTER — CLINICAL SUPPORT (OUTPATIENT)
Dept: GASTROENTEROLOGY | Facility: CLINIC | Age: 22
End: 2022-03-29
Payer: MEDICAID

## 2022-03-29 DIAGNOSIS — K50.813 CROHN'S DISEASE OF BOTH SMALL AND LARGE INTESTINE WITH FISTULA: Primary | ICD-10-CM

## 2022-03-29 NOTE — PROGRESS NOTES
[facility]  Inflammatory Bowel Disease  Pharmacy Note    TODAY'S VISIT DATE:  3/29/2022    Reason for visit: Injection training    IBD treatment to be initiated/optimized: Dante      IBD MD: Carlos      Pertinent History:  - IBD phenotype: Crohn's disease of both large and small intestines  - Signs and symptoms:  o BM: 3-4 soft BM/ day   o Blood/ Mucus/ night time BM: No.   o Abdominal pain: occasional since stopping budesonide (4 times in two weeks)   - Symptoms of infection (current or past 1 week)? (fever >100.4 F, URI, flu-like symptoms, cough, painful urination, warm/red/painful skin or skin ulcers/wounds, tooth pain): No   - Recent Antibiotics use in the last 30 days: No   - Dispensing pharmacy/infusion center: [facility]  - Current therapy: Stelara 90mg every 8 weeks (started IV on 2/1/2022, first SC 3/29, ND 5/24)    Therapeutic   Patient reports noticing improvements since her Stelara IV infusion on 2/1/2022. BM frequency and consistency has improved. She reports two episodes of urgency and watery BM on Friday and Saturday of this past week. Unable to identify any possible triggers, but the symptoms resolved on their own. She self discontinued budesonide on 3/6/22,  five weeks after Stelara IV infusion as she started feeling better. Since then she only takes budesonide occasionally when she has abdominal cramps. This happened 4 times in the last two weeks and budesonide helped relieve the pain.     Therapeutic Drug Monitoring Labs:  N/A     Prior IBD Therapies:  Infliximab - first infusion 12/1 second 12/15 - d/c due to left sided muscle weakness and numbness.   Prednisone       Vaccinations:  Lab Results   Component Value Date    HEPBSAB <3.10 10/13/2021     No results found for: HEPBSURFABQU  No results found for: HEPAIGG  No results found for: VARICELLAZOS, VARICELLAINT  Immunization History   Administered Date(s) Administered    COVID-19, vector-nr, rS-Ad26, PF ("[employer] [employer]") 10/25/2021     DTaP 01/12/1999, 02/22/1999, 05/26/1999, 01/11/2000, 09/25/2002    HIB 01/12/1999, 02/22/1999, 05/26/1999, 01/11/2000    Hepatitis B 1998, 1998, 08/25/1999    Hib-HbOC 01/11/2000    IPV 01/12/1999, 02/22/1999, 12/08/1999, 09/25/2002    Influenza 10/28/2011    Influenza (Flumist) - Quadrivalent - Intranasal *Preferred* (2-49 years old) 12/07/2015    Influenza - Quadrivalent - PF *Preferred* (6 months and older) 01/06/2019    Influenza - Trivalent (ADULT) 11/20/2002, 11/04/2003, 01/05/2005    Influenza - Trivalent - PF (ADULT) 11/20/2002, 11/04/2003, 01/05/2005, 10/28/2011    MMR 01/11/2000, 09/25/2002    Meningococcal Conjugate (MCV4P) 01/26/2010, 04/21/2015    OPV 12/08/1999    Rho (D) Immune Globulin 11/16/2018    Rho (D) Immune Globulin - IM 08/02/2018, 01/05/2019    Tdap 03/24/2010, 01/06/2019    Varicella 12/08/1999, 01/26/2010     Influenza:  Recommended annual standard flu shot   PCV 13: recommend to get at next visit with Dr. Gonzalez  PPSV 23: 2-12 months after PCV13   Tetanus (TdaP): 2029  HPV: discuss at next visit       Meningococcal:  Hepatitis B:  Check immunity   Hepatitis A:  Check immunity   MMR (live vaccine): check immunity        Chickenpox status/Varicella (live vaccine): check immunity   Shingrix: recommend to get at next visit with Dr. Gonzalez  Covid:  eligible for booster.        All Medical History/Surgical History/Family History/Social History/Allergies have been reviewed and updated in EMR    Review of patient's allergies indicates:  No Known Allergies      Current Medications:   No outpatient medications have been marked as taking for the 3/29/22 encounter (Appointment) with IBD PHARMACIST.       Reviewed all current medications including OTC, herbals and supplements.     Labs:   Lab Results   Component Value Date    HEPBSAG Negative 10/13/2021     No results found for: TBGOLDPLUS  Lab Results   Component Value Date    YNUMGPIN53 578 01/25/2022     Lab Results    Component Value Date    WBC 12.21 03/09/2022    HGB 9.4 (L) 03/09/2022    HCT 32.5 (L) 03/09/2022    MCV 74 (L) 03/09/2022     (H) 03/09/2022     Lab Results   Component Value Date    CREATININE 0.9 03/09/2022    ALBUMIN 3.7 03/09/2022    BILITOT 0.4 03/09/2022    ALKPHOS 55 03/09/2022    AST 10 03/09/2022    ALT 13 03/09/2022         Assessment/Plan:  Mindi Tate is a 21 y.o. female that  has a past medical history of ADHD (attention deficit hyperactivity disorder), Anemia, Anemia, Anxiety, Depression, H/O seasonal allergies, and Pancreatitis. Patient presents to clinic for a injection training visit with IBD pharmacist. She received Stelara IV infusion on 2/1/22 and reports improvement since. Today she will be completing the first SC injection in clinic and brought her Stelara pen with her. ND to be self injected at home on 5/24/22.    #Injection technique  - Denies any side effects or concerns after the first IV infusion.   - Discussed proper storage of Stelara in the refrigerator. Ok in room temperature for up to 30 days, but if out of the fridge not recommended to put it back in the refrigerator.   - Educated patient on proper injection technique including: hand hygiene prior to injection, gently pinch the cleaned skin, use a quick dart-like motion to insert the needle in a 45 degree angle, push the plunger all the way down and count to 5 before removing the needle.  - Counseled patient on proper disposal of the single dose pen in a sharps container or an empty bottle made of hard plastic (example: detergent bottle, milk jug etc)  - Patient was able to demonstrate the appropriate injection steps utilizing the demo pen.  - She successfully completed the first injection of Stelara in clinic while I was observing. Expressed being comfortable with self injections in the future  - Printed instructions were provided for patient at the end of the visit  - Patient verbalized understanding instructions.    #  Maintenance recommendations   - Annual skin exam: discussed need for annual skin exam and advised pt schedule a derm appointment.   - Vaccines: pneumonia and shingles series, covid booster   - Patient is eligible for Covid booster and plans to schedule and get it within the next week   - PCV13 and shingrix first dose to be scheduled after next appt with Dr. Gonzalez in June. PPSV23 and Shingrix second dose due in 8/2022  - Patient verbalized understanding instructions.      Lisset Arana, PharmD  IBD Clinical Pharmacist

## 2022-03-29 NOTE — Clinical Note
FYI - her next appt with you is on 6/14, which is not a Wednesday. Not sure if you want to change her appt  day or switch her to a different provider.

## 2022-04-18 ENCOUNTER — PATIENT MESSAGE (OUTPATIENT)
Dept: ADMINISTRATIVE | Facility: OTHER | Age: 22
End: 2022-04-18
Payer: MEDICAID

## 2022-05-16 ENCOUNTER — SPECIALTY PHARMACY (OUTPATIENT)
Dept: PHARMACY | Facility: CLINIC | Age: 22
End: 2022-05-16
Payer: MEDICAID

## 2022-06-21 ENCOUNTER — LAB VISIT (OUTPATIENT)
Dept: LAB | Facility: HOSPITAL | Age: 22
End: 2022-06-21
Attending: SPECIALIST
Payer: MEDICAID

## 2022-06-21 ENCOUNTER — PATIENT MESSAGE (OUTPATIENT)
Dept: OBSTETRICS AND GYNECOLOGY | Facility: CLINIC | Age: 22
End: 2022-06-21

## 2022-06-21 ENCOUNTER — OFFICE VISIT (OUTPATIENT)
Dept: OBSTETRICS AND GYNECOLOGY | Facility: CLINIC | Age: 22
End: 2022-06-21
Payer: MEDICAID

## 2022-06-21 VITALS
SYSTOLIC BLOOD PRESSURE: 141 MMHG | DIASTOLIC BLOOD PRESSURE: 65 MMHG | HEIGHT: 70 IN | HEART RATE: 76 BPM | WEIGHT: 254 LBS | BODY MASS INDEX: 36.36 KG/M2

## 2022-06-21 DIAGNOSIS — Z34.90 EARLY STAGE OF PREGNANCY: ICD-10-CM

## 2022-06-21 DIAGNOSIS — Z11.3 SCREEN FOR STD (SEXUALLY TRANSMITTED DISEASE): ICD-10-CM

## 2022-06-21 DIAGNOSIS — N91.0 DELAYED MENSES: Primary | ICD-10-CM

## 2022-06-21 DIAGNOSIS — Z12.4 ENCOUNTER FOR PAP SMEAR OF CERVIX WITH HPV DNA COTESTING: ICD-10-CM

## 2022-06-21 LAB
B-HCG UR QL: POSITIVE
CTP QC/QA: YES
HCG INTACT+B SERPL-ACNC: NORMAL MIU/ML
PROGEST SERPL-MCNC: 11.7 NG/ML

## 2022-06-21 PROCEDURE — 3008F PR BODY MASS INDEX (BMI) DOCUMENTED: ICD-10-PCS | Mod: CPTII,,, | Performed by: SPECIALIST

## 2022-06-21 PROCEDURE — 99213 OFFICE O/P EST LOW 20 MIN: CPT | Mod: PBBFAC,TH,PN | Performed by: SPECIALIST

## 2022-06-21 PROCEDURE — 76817 PR US, OB, TRANSVAG APPROACH: ICD-10-PCS | Mod: 26,S$PBB,, | Performed by: SPECIALIST

## 2022-06-21 PROCEDURE — 3008F BODY MASS INDEX DOCD: CPT | Mod: CPTII,,, | Performed by: SPECIALIST

## 2022-06-21 PROCEDURE — 99203 OFFICE O/P NEW LOW 30 MIN: CPT | Mod: 25,TH,S$PBB, | Performed by: SPECIALIST

## 2022-06-21 PROCEDURE — 84144 ASSAY OF PROGESTERONE: CPT | Performed by: SPECIALIST

## 2022-06-21 PROCEDURE — 81025 URINE PREGNANCY TEST: CPT | Mod: PBBFAC,PN | Performed by: SPECIALIST

## 2022-06-21 PROCEDURE — 1159F PR MEDICATION LIST DOCUMENTED IN MEDICAL RECORD: ICD-10-PCS | Mod: CPTII,,, | Performed by: SPECIALIST

## 2022-06-21 PROCEDURE — 87624 HPV HI-RISK TYP POOLED RSLT: CPT | Performed by: SPECIALIST

## 2022-06-21 PROCEDURE — 99999 PR PBB SHADOW E&M-EST. PATIENT-LVL III: CPT | Mod: PBBFAC,,, | Performed by: SPECIALIST

## 2022-06-21 PROCEDURE — 3077F SYST BP >= 140 MM HG: CPT | Mod: CPTII,,, | Performed by: SPECIALIST

## 2022-06-21 PROCEDURE — 36415 COLL VENOUS BLD VENIPUNCTURE: CPT | Mod: PN | Performed by: SPECIALIST

## 2022-06-21 PROCEDURE — 76817 TRANSVAGINAL US OBSTETRIC: CPT | Mod: 26,S$PBB,, | Performed by: SPECIALIST

## 2022-06-21 PROCEDURE — 84702 CHORIONIC GONADOTROPIN TEST: CPT | Performed by: SPECIALIST

## 2022-06-21 PROCEDURE — 99999 PR PBB SHADOW E&M-EST. PATIENT-LVL III: ICD-10-PCS | Mod: PBBFAC,,, | Performed by: SPECIALIST

## 2022-06-21 PROCEDURE — 76817 TRANSVAGINAL US OBSTETRIC: CPT | Mod: PBBFAC,PN | Performed by: SPECIALIST

## 2022-06-21 PROCEDURE — 3078F PR MOST RECENT DIASTOLIC BLOOD PRESSURE < 80 MM HG: ICD-10-PCS | Mod: CPTII,,, | Performed by: SPECIALIST

## 2022-06-21 PROCEDURE — 1159F MED LIST DOCD IN RCRD: CPT | Mod: CPTII,,, | Performed by: SPECIALIST

## 2022-06-21 PROCEDURE — 3077F PR MOST RECENT SYSTOLIC BLOOD PRESSURE >= 140 MM HG: ICD-10-PCS | Mod: CPTII,,, | Performed by: SPECIALIST

## 2022-06-21 PROCEDURE — 88175 CYTOPATH C/V AUTO FLUID REDO: CPT | Performed by: SPECIALIST

## 2022-06-21 PROCEDURE — 99203 PR OFFICE/OUTPT VISIT, NEW, LEVL III, 30-44 MIN: ICD-10-PCS | Mod: 25,TH,S$PBB, | Performed by: SPECIALIST

## 2022-06-21 PROCEDURE — 87591 N.GONORRHOEAE DNA AMP PROB: CPT | Performed by: SPECIALIST

## 2022-06-21 PROCEDURE — 87491 CHLMYD TRACH DNA AMP PROBE: CPT | Performed by: SPECIALIST

## 2022-06-21 PROCEDURE — 3078F DIAST BP <80 MM HG: CPT | Mod: CPTII,,, | Performed by: SPECIALIST

## 2022-06-21 RX ORDER — ASCORBIC ACID, CHOLECALCIFEROL, .ALPHA.-TOCOPHEROL ACETATE, DL-, PYRIDOXINE HYDROCHLORIDE, FOLIC ACID, CYANOCOBALAMIN, BIOTIN, CALCIUM CARBONATE, FERROUS ASPARTO GLYCINATE, IRON, POTASSIUM IODIDE, MAGNESIUM OXIDE, DOCONEXENT AND LOWBUSH BLUEBERRY 60; 1000; 10; 26; 400; 13; 280; 80; 9; 9; 150; 25; 350; 25; 600 MG/1; [IU]/1; [IU]/1; MG/1; UG/1; UG/1; UG/1; MG/1; MG/1; MG/1; UG/1; MG/1; MG/1; MG/1; UG/1
1 CAPSULE, GELATIN COATED ORAL DAILY
Qty: 100 CAPSULE | Refills: 3 | Status: SHIPPED | OUTPATIENT
Start: 2022-06-21 | End: 2022-07-17 | Stop reason: SDUPTHER

## 2022-06-21 NOTE — PROGRESS NOTES
22 yo BF  previous  presents for initial PNC Pos UPT LMP approx 5/15  Significant history Crohns    Past Medical History:   Diagnosis Date    ADHD (attention deficit hyperactivity disorder)     Anemia     Anemia     Anxiety     Crohn disease     Depression     H/O seasonal allergies     Pancreatitis 2019       Past Surgical History:   Procedure Laterality Date    ADENOIDECTOMY       SECTION Left 2019    Procedure:  SECTION;  Surgeon: Robbie Jaramillo MD;  Location: [facility] L&D;  Service: OB/GYN;  Laterality: Left;     SECTION      CHOLECYSTECTOMY      COLONOSCOPY N/A 10/15/2020    Procedure: COLONOSCOPY;  Surgeon: Olegario Lozano MD;  Location: [facility] ENDO;  Service: Endoscopy;  Laterality: N/A;    COLONOSCOPY N/A 10/7/2021    Procedure: COLONOSCOPY- unable to reach cecum -poor prep;  Surgeon: Dre Hubbard MD;  Location: [facility];  Service: Endoscopy;  Laterality: N/A;    COLONOSCOPY N/A 10/12/2021    Procedure: COLONOSCOPY- no cecum -poor prep;  Surgeon: Olegraio Lozano MD;  Location: [facility];  Service: Endoscopy;  Laterality: N/A;    ESOPHAGOGASTRODUODENOSCOPY N/A 2021    Procedure: EGD (ESOPHAGOGASTRODUODENOSCOPY);  Surgeon: Emanuel Jackson MD;  Location: [facility];  Service: Endoscopy;  Laterality: N/A;    LAPAROSCOPIC CHOLECYSTECTOMY N/A 2019    Procedure: CHOLECYSTECTOMY, LAPAROSCOPIC;  Surgeon: Jewel Cutler MD;  Location: [facility];  Service: General;  Laterality: N/A;    TONSILLECTOMY      UPPER GASTROINTESTINAL ENDOSCOPY         Family History   Problem Relation Age of Onset    Breast cancer Paternal Aunt     Multiple sclerosis Father     Seizures Father     Asthma Sister     Crohn's disease Brother     Ovarian cancer Neg Hx        Social History     Socioeconomic History    Marital status: Single   Tobacco Use    Smoking status: Never Smoker    Smokeless tobacco: Never Used   Substance and Sexual  Activity    Alcohol use: Not Currently     Comment: socially    Drug use: No    Sexual activity: Yes     Partners: Male       Current Outpatient Medications   Medication Sig Dispense Refill    ustekinumab (STELARA) 90 mg/mL Syrg syringe Inject 1 mL (90 mg total) into the skin every 8 weeks. 1 mL 2    acetaminophen (TYLENOL) 500 MG tablet Take 1,000 mg by mouth as needed for Pain.      balsalazide (COLAZAL) 750 mg capsule Take 3 capsules (2,250 mg total) by mouth 2 (two) times a day. (Patient not taking: Reported on 6/21/2022) 180 capsule 11    cyclobenzaprine (FLEXERIL) 5 MG tablet Take 1 tablet by mouth nightly as needed for Muscle spasms.      LIDOcaine (LIDODERM) 5 % Place 1 patch onto the skin daily as needed (pain).       lisdexamfetamine (VYVANSE) 70 MG capsule Take 70 mg by mouth every morning.      predniSONE (DELTASONE) 10 MG tablet TAKE 4 TABLETS BY MOUTH ONCE DAILY (Patient not taking: Reported on 6/21/2022) 120 tablet 0    prochlorperazine (COMPAZINE) 5 MG tablet Take by mouth.      propranoloL (INDERAL) 20 MG tablet Take 20 mg by mouth 3 (three) times daily as needed (high blood pressure).       traMADoL (ULTRAM) 50 mg tablet Take 50 mg by mouth 3 (three) times daily as needed.       No current facility-administered medications for this visit.       Review of patient's allergies indicates:  No Known Allergies    Review of System:   General: no chills, fever, night sweats, weight gain or weight loss  Psychological: no depression or suicidal ideation  Breasts: no new or changing breast lumps, nipple discharge or masses.  Respiratory: no cough, shortness of breath, or wheezing  Cardiovascular: no chest pain or dyspnea on exertion  Gastrointestinal: no abdominal pain, change in bowel habits, or black or bloody stools  Genito-Urinary: no incontinence, urinary frequency/urgency or vulvar/vaginal symptoms, pelvic pain or abnormal vaginal bleeding.  Musculoskeletal: no gait disturbance or muscular  weakness                                              General Appearance    A and O x 4, Cooperative, no distress   Breasts    Abdomen   Deferred  Soft, non-tender, bowel sounds active all four quadrants,  no masses, no organomegaly    Genitourinary:   External rectal exam shows no thrombosed external hemorrhoids.   Pelvic exam was performed with patient supine.  No labial fusion.  There is no rash, lesion or injury on the right labia.  There is no rash, lesion or injury on the left labia.  No bleeding and no signs of injury around the vaginal introitus, urethra is without lesions and well supported. The cervix is visualized with no discharge, lesions or friability.  No vaginal discharge found.  No significant Cystocele, Enterocele or rectocele, and uterus well supported.  Bimanual exam:  The urethra is normal to palpation and there are no palpable vaginal wall masses.  Uterus is not deviated, not enlarged, not fixed, normal shape and not tender.  Cervix exhibits no motion tenderness.   Right adnexum displays no mass and no tenderness.  Left adnexum displays no mass and no tenderness.   Extremities: Extremities normal, atraumatic, no cyanosis or edema                     NOTE  NURSING PERSONAL PRESENT FOR ENTIRE PHYSICAL EXAM    Procedure TVS 6.5Mhz probe    Positive intrauterine decidual reaction no free fluid No YS or FP present Central echolucent fundal focus 1.2cm No adnexal masses    I discussed Crohns in pregnancy and discussed possible early IUP vs ectopic  I will obtain BHCG and Prog and follow Once established viable pregnancy , will discuss GI and MFM referel  Answered all questions and will follow

## 2022-06-21 NOTE — PROCEDURES
Procedures       Procedure TVS 6.5Mhz probe    Positive intrauterine decidual reaction no free fluid No YS or FP present Central echolucent fundal focus 1.2cm No adnexal masses

## 2022-06-22 DIAGNOSIS — Z34.90 EARLY STAGE OF PREGNANCY: Primary | ICD-10-CM

## 2022-06-24 ENCOUNTER — TELEPHONE (OUTPATIENT)
Dept: OBSTETRICS AND GYNECOLOGY | Facility: CLINIC | Age: 22
End: 2022-06-24
Payer: MEDICAID

## 2022-06-24 LAB
C TRACH DNA SPEC QL NAA+PROBE: NOT DETECTED
N GONORRHOEA DNA SPEC QL NAA+PROBE: NOT DETECTED

## 2022-06-24 RX ORDER — ONDANSETRON 4 MG/1
4 TABLET, ORALLY DISINTEGRATING ORAL EVERY 6 HOURS PRN
Qty: 20 TABLET | Refills: 1 | Status: SHIPPED | OUTPATIENT
Start: 2022-06-24 | End: 2022-07-17 | Stop reason: SDUPTHER

## 2022-06-24 RX ORDER — DOXYLAMINE SUCCINATE AND PYRIDOXINE HYDROCHLORIDE, DELAYED RELEASE TABLETS 10 MG/10 MG 10; 10 MG/1; MG/1
2 TABLET, DELAYED RELEASE ORAL NIGHTLY
Qty: 60 TABLET | Refills: 2 | Status: ON HOLD | OUTPATIENT
Start: 2022-06-24 | End: 2023-02-15 | Stop reason: HOSPADM

## 2022-06-24 NOTE — TELEPHONE ENCOUNTER
----- Message from Caitlin Sena sent at 6/24/2022 11:45 AM CDT -----  .Type:  Patient Call Back    Who Called: PT       Does the patient know what this is regarding?: PT CALLED TO GET SOMETHING FOR NAUSEA     Would the patient rather a call back YES     Best Call Back Number: [phone]    Additional Information: [facility]    [place] [address]   Phone:  [phone]  Fax:  [phone]      Thank You

## 2022-06-27 ENCOUNTER — HOSPITAL ENCOUNTER (OUTPATIENT)
Dept: RADIOLOGY | Facility: HOSPITAL | Age: 22
Discharge: HOME | End: 2022-06-27
Attending: SPECIALIST
Payer: MEDICAID

## 2022-06-27 DIAGNOSIS — Z34.90 EARLY STAGE OF PREGNANCY: ICD-10-CM

## 2022-06-27 PROCEDURE — 76801 US OB <14 WEEKS, TRANSABDOM & TRANSVAG, SINGLE GESTATION (XPD): ICD-10-PCS | Mod: 26,,, | Performed by: RADIOLOGY

## 2022-06-27 PROCEDURE — 76817 US OB <14 WEEKS, TRANSABDOM & TRANSVAG, SINGLE GESTATION (XPD): ICD-10-PCS | Mod: 26,,, | Performed by: RADIOLOGY

## 2022-06-27 PROCEDURE — 76801 OB US < 14 WKS SINGLE FETUS: CPT | Mod: 26,,, | Performed by: RADIOLOGY

## 2022-06-27 PROCEDURE — 76817 TRANSVAGINAL US OBSTETRIC: CPT | Mod: 26,,, | Performed by: RADIOLOGY

## 2022-06-27 PROCEDURE — 76801 OB US < 14 WKS SINGLE FETUS: CPT | Mod: TC,PN

## 2022-07-07 ENCOUNTER — PATIENT MESSAGE (OUTPATIENT)
Dept: OBSTETRICS AND GYNECOLOGY | Facility: CLINIC | Age: 22
End: 2022-07-07
Payer: MEDICAID

## 2022-07-08 ENCOUNTER — SPECIALTY PHARMACY (OUTPATIENT)
Dept: PHARMACY | Facility: CLINIC | Age: 22
End: 2022-07-08
Payer: MEDICAID

## 2022-07-08 NOTE — TELEPHONE ENCOUNTER
Specialty Pharmacy - Refill Coordination    Specialty Medication Orders Linked to Encounter    Flowsheet Row Most Recent Value   Medication #1 ustekinumab (STELARA) 90 mg/mL Syrg syringe (Order#[id number], Rx#[id number])          Refill Questions - Documented Responses    Flowsheet Row Most Recent Value   Patient Availability and HIPAA Verification    Does patient want to proceed with activity? Yes   HIPAA/medical authority confirmed? Yes   Relationship to patient of person spoken to? Self   Refill Screening Questions    When does the patient need to receive the medication? 07/11/22   Refill Delivery Questions    How will the patient receive the medication? Delivery Merary   When does the patient need to receive the medication? 07/11/22   Shipping Address Home   Address in [facility] confirmed and updated if neccessary? Yes   Expected Copay ($) 0   Is the patient able to afford the medication copay? Yes   Payment Method zero copay   Days supply of Refill 56   Supplies needed? Alcohol Swabs   Refill activity completed? Yes   Refill activity plan Refill scheduled   Shipment/Pickup Date: 07/11/22          Current Outpatient Medications   Medication Sig    acetaminophen (TYLENOL) 500 MG tablet Take 1,000 mg by mouth as needed for Pain.    balsalazide (COLAZAL) 750 mg capsule Take 3 capsules (2,250 mg total) by mouth 2 (two) times a day. (Patient not taking: Reported on 6/21/2022)    cyclobenzaprine (FLEXERIL) 5 MG tablet Take 1 tablet by mouth nightly as needed for Muscle spasms.    doxylamine-pyridoxine, vit B6, (DICLEGIS) 10-10 mg TbEC Take 2 tablets by mouth every evening.    LIDOcaine (LIDODERM) 5 % Place 1 patch onto the skin daily as needed (pain).     lisdexamfetamine (VYVANSE) 70 MG capsule Take 70 mg by mouth every morning.    ondansetron (ZOFRAN-ODT) 4 MG TbDL Take 1 tablet (4 mg total) by mouth every 6 (six) hours as needed.    predniSONE (DELTASONE) 10 MG tablet TAKE 4 TABLETS BY MOUTH ONCE  DAILY (Patient not taking: Reported on 6/21/2022)    prenatal vit 87-iron-folic-dha (PRENATE MINI, FERR ASP GLYCIN,) 18-1-350 mg Cap Take 1 tablet by mouth once daily.    prochlorperazine (COMPAZINE) 5 MG tablet Take by mouth.    propranoloL (INDERAL) 20 MG tablet Take 20 mg by mouth 3 (three) times daily as needed (high blood pressure).     traMADoL (ULTRAM) 50 mg tablet Take 50 mg by mouth 3 (three) times daily as needed.    ustekinumab (STELARA) 90 mg/mL Syrg syringe Inject 1 mL (90 mg total) into the skin every 8 weeks.   Last reviewed on 6/21/2022  2:25 PM by Gladys Acevedo LPN    Review of patient's allergies indicates:  No Known Allergies Last reviewed on  6/24/2022 12:03 PM by Robbie Jaramillo      Tasks added this encounter   8/30/2022 - Refill Call (Auto Added)   Tasks due within next 3 months   No tasks due.     Daysi Shaffer, PharmD  [facility]  [address] [place], [place] [address]  [facility] [address]  Phone: [phone]  Fax: [phone]

## 2022-07-19 ENCOUNTER — ROUTINE PRENATAL (OUTPATIENT)
Dept: OBSTETRICS AND GYNECOLOGY | Facility: CLINIC | Age: 22
End: 2022-07-19
Payer: MEDICAID

## 2022-07-19 ENCOUNTER — PATIENT MESSAGE (OUTPATIENT)
Dept: OBSTETRICS AND GYNECOLOGY | Facility: CLINIC | Age: 22
End: 2022-07-19

## 2022-07-19 VITALS
SYSTOLIC BLOOD PRESSURE: 115 MMHG | BODY MASS INDEX: 36.87 KG/M2 | WEIGHT: 253.31 LBS | DIASTOLIC BLOOD PRESSURE: 58 MMHG

## 2022-07-19 DIAGNOSIS — Z3A.09 9 WEEKS GESTATION OF PREGNANCY: Primary | ICD-10-CM

## 2022-07-19 LAB
BILIRUB SERPL-MCNC: NORMAL MG/DL
BLOOD URINE, POC: NORMAL
CLARITY, POC UA: NORMAL
COLOR, POC UA: YELLOW
GLUCOSE UR QL STRIP: NORMAL
KETONES UR QL STRIP: NORMAL
LEUKOCYTE ESTERASE URINE, POC: NORMAL
NITRITE, POC UA: NORMAL
PH, POC UA: 5
PROTEIN, POC: NORMAL
SPECIFIC GRAVITY, POC UA: 1.02
UROBILINOGEN, POC UA: NORMAL

## 2022-07-19 PROCEDURE — 99213 PR OFFICE/OUTPT VISIT, EST, LEVL III, 20-29 MIN: ICD-10-PCS | Mod: TH,S$PBB,, | Performed by: SPECIALIST

## 2022-07-19 PROCEDURE — 99999 PR PBB SHADOW E&M-EST. PATIENT-LVL III: CPT | Mod: PBBFAC,,, | Performed by: SPECIALIST

## 2022-07-19 PROCEDURE — 99213 OFFICE O/P EST LOW 20 MIN: CPT | Mod: TH,S$PBB,, | Performed by: SPECIALIST

## 2022-07-19 PROCEDURE — 81002 URINALYSIS NONAUTO W/O SCOPE: CPT | Mod: PBBFAC,PN | Performed by: SPECIALIST

## 2022-07-19 PROCEDURE — 99213 OFFICE O/P EST LOW 20 MIN: CPT | Mod: PBBFAC,PN | Performed by: SPECIALIST

## 2022-07-19 PROCEDURE — 99999 PR PBB SHADOW E&M-EST. PATIENT-LVL III: ICD-10-PCS | Mod: PBBFAC,,, | Performed by: SPECIALIST

## 2022-07-19 RX ORDER — PROMETHAZINE HYDROCHLORIDE 25 MG/1
25 TABLET ORAL EVERY 4 HOURS
Qty: 30 TABLET | Refills: 1 | Status: SHIPPED | OUTPATIENT
Start: 2022-07-19 | End: 2022-09-15 | Stop reason: ALTCHOICE

## 2022-07-19 NOTE — PROGRESS NOTES
Complaints today:mild nausea , No Bleeding or pains    BP (!) 115/58   Wt 114.9 kg (253 lb 4.9 oz)   LMP 05/15/2022   BMI 36.87 kg/m²     21 y.o., at 9w2d by Estimated Date of Delivery: 23  Patient Active Problem List   Diagnosis    Amniotic fluid leaking    S/P     Class 2 obesity in adult    Non-intractable vomiting    Intravascular volume depletion    Left leg numbness    Family dynamics problem    Abdominal pain    Acute on chronic colitis    Chronic pancreatitis    Inflammatory bowel disease    Crohn's disease    Chronic anemia    Exacerbation of Crohn's disease    ACP (advance care planning)    Colitis    Crohn's colitis    Crohn's disease of both small and large intestine with fistula     OB History    Para Term  AB Living   2 1 1 0 0 0   SAB IAB Ectopic Multiple Live Births   0 0 0 0 0      # Outcome Date GA Lbr Enrique/2nd Weight Sex Delivery Anes PTL Lv   2 Current            1 Term 19 37w1d  2.863 kg (6 lb 5 oz) F CS-LTranv EPI Y       Complications: Dysfunctional Labor       Dating reviewed    Allergies and problem list reviewed and updated    Medical and surgical history reviewed    Prenatal labs reviewed and updated    Physical Exam:  ABD: soft, gravid, nontender, Abd RTS for FHR  3.5Mhz head  Pos  viwed by pt    Assessment:  IUP 9 w 2d    Plan:   Discussed and offered CFDNA testing  follow up 4 Weeks, bleeding/pain precautions

## 2022-07-21 ENCOUNTER — PATIENT MESSAGE (OUTPATIENT)
Dept: GASTROENTEROLOGY | Facility: CLINIC | Age: 22
End: 2022-07-21
Payer: MEDICAID

## 2022-07-21 ENCOUNTER — TELEPHONE (OUTPATIENT)
Dept: GASTROENTEROLOGY | Facility: CLINIC | Age: 22
End: 2022-07-21
Payer: MEDICAID

## 2022-08-15 ENCOUNTER — TELEPHONE (OUTPATIENT)
Dept: OBSTETRICS AND GYNECOLOGY | Facility: CLINIC | Age: 22
End: 2022-08-15
Payer: MEDICAID

## 2022-08-18 ENCOUNTER — ROUTINE PRENATAL (OUTPATIENT)
Dept: OBSTETRICS AND GYNECOLOGY | Facility: CLINIC | Age: 22
End: 2022-08-18
Payer: MEDICAID

## 2022-08-18 ENCOUNTER — PATIENT MESSAGE (OUTPATIENT)
Dept: ADMINISTRATIVE | Facility: OTHER | Age: 22
End: 2022-08-18
Payer: MEDICAID

## 2022-08-18 ENCOUNTER — OFFICE VISIT (OUTPATIENT)
Dept: OBSTETRICS AND GYNECOLOGY | Facility: CLINIC | Age: 22
End: 2022-08-18
Payer: MEDICAID

## 2022-08-18 VITALS — WEIGHT: 259.69 LBS | BODY MASS INDEX: 37.18 KG/M2 | HEIGHT: 70 IN

## 2022-08-18 VITALS — SYSTOLIC BLOOD PRESSURE: 140 MMHG | DIASTOLIC BLOOD PRESSURE: 56 MMHG | BODY MASS INDEX: 37.8 KG/M2 | WEIGHT: 259.69 LBS

## 2022-08-18 DIAGNOSIS — Z3A.13 13 WEEKS GESTATION OF PREGNANCY: Primary | ICD-10-CM

## 2022-08-18 LAB
BILIRUB SERPL-MCNC: NORMAL MG/DL
BLOOD URINE, POC: NORMAL
CLARITY, POC UA: CLEAR
COLOR, POC UA: YELLOW
GLUCOSE UR QL STRIP: NORMAL
KETONES UR QL STRIP: NORMAL
LEUKOCYTE ESTERASE URINE, POC: NORMAL
NITRITE, POC UA: NORMAL
PH, POC UA: 6
PROTEIN, POC: NORMAL
SPECIFIC GRAVITY, POC UA: 1.01
UROBILINOGEN, POC UA: NORMAL

## 2022-08-18 PROCEDURE — 99213 OFFICE O/P EST LOW 20 MIN: CPT | Mod: PBBFAC,PN | Performed by: SPECIALIST

## 2022-08-18 PROCEDURE — 99213 OFFICE O/P EST LOW 20 MIN: CPT | Mod: TH,S$PBB,, | Performed by: SPECIALIST

## 2022-08-18 PROCEDURE — 81002 URINALYSIS NONAUTO W/O SCOPE: CPT | Mod: PBBFAC,PN | Performed by: SPECIALIST

## 2022-08-18 PROCEDURE — 99213 PR OFFICE/OUTPT VISIT, EST, LEVL III, 20-29 MIN: ICD-10-PCS | Mod: TH,S$PBB,, | Performed by: SPECIALIST

## 2022-08-18 PROCEDURE — 99999 PR PBB SHADOW E&M-EST. PATIENT-LVL III: CPT | Mod: PBBFAC,,, | Performed by: SPECIALIST

## 2022-08-18 PROCEDURE — 99999 PR PBB SHADOW E&M-EST. PATIENT-LVL III: ICD-10-PCS | Mod: PBBFAC,,, | Performed by: SPECIALIST

## 2022-08-18 RX ORDER — ONDANSETRON 4 MG/1
4 TABLET, ORALLY DISINTEGRATING ORAL EVERY 6 HOURS PRN
Qty: 20 TABLET | Refills: 1 | Status: SHIPPED | OUTPATIENT
Start: 2022-08-18 | End: 2022-09-15 | Stop reason: SDUPTHER

## 2022-08-18 NOTE — PROGRESS NOTES
Complaints today: Mild nausea, No Bleeding or pains  Discussed CFDNA results      BP (!) 140/56   Wt 117.8 kg (259 lb 11.2 oz)   LMP 05/15/2022   BMI 37.80 kg/m²     21 y.o., at 13w4d by Estimated Date of Delivery: 23  Patient Active Problem List   Diagnosis    Amniotic fluid leaking    S/P     Class 2 obesity in adult    Non-intractable vomiting    Intravascular volume depletion    Left leg numbness    Family dynamics problem    Abdominal pain    Acute on chronic colitis    Chronic pancreatitis    Inflammatory bowel disease    Crohn's disease    Chronic anemia    Exacerbation of Crohn's disease    ACP (advance care planning)    Colitis    Crohn's colitis    Crohn's disease of both small and large intestine with fistula     OB History    Para Term  AB Living   2 1 1 0 0 0   SAB IAB Ectopic Multiple Live Births   0 0 0 0 0      # Outcome Date GA Lbr Enrique/2nd Weight Sex Delivery Anes PTL Lv   2 Current            1 Term 19 37w1d  2.863 kg (6 lb 5 oz) F CS-LTranv EPI Y       Complications: Dysfunctional Labor       Dating reviewed    Allergies and problem list reviewed and updated    Medical and surgical history reviewed    Prenatal labs reviewed and updated    Physical Exam:  ABD: soft, gravid, nontender,     Assessment:  IUP 13 w 4 d    Plan:   Anatomy u/s on RTO  follow up 4 Weeks, bleeding/pain precautions        Is This A New Presentation, Or A Follow-Up?: Growth

## 2022-08-30 ENCOUNTER — SPECIALTY PHARMACY (OUTPATIENT)
Dept: PHARMACY | Facility: CLINIC | Age: 22
End: 2022-08-30
Payer: MEDICAID

## 2022-08-30 DIAGNOSIS — K50.813 CROHN'S DISEASE OF BOTH SMALL AND LARGE INTESTINE WITH FISTULA: ICD-10-CM

## 2022-08-30 RX ORDER — USTEKINUMAB 90 MG/ML
90 INJECTION, SOLUTION SUBCUTANEOUS
Qty: 1 ML | Refills: 0 | Status: SHIPPED | OUTPATIENT
Start: 2022-08-30 | End: 2022-10-31 | Stop reason: SDUPTHER

## 2022-08-30 NOTE — TELEPHONE ENCOUNTER
Outgoing call for Stelara refill but 0 refills remain. Pt aware and states next dose is due 9/13/22. Informed pt [facility] will call back to setup shipment once rx received. Will follow up.

## 2022-08-30 NOTE — TELEPHONE ENCOUNTER
Refill request for Stelara    Allergies reviewed     Drug Monitoring labs:  CBC/CMP q 6 months; TB & Hep B yearly    Lab Results   Component Value Date    HEPBSAG Negative 10/13/2021     No results found for: TBGOLDPLUS  Lab Results   Component Value Date    CMOJARNH61 578 01/25/2022     Lab Results   Component Value Date    WBC 8.58 06/22/2022    HGB 11.1 (L) 06/22/2022    HCT 36.3 (L) 06/22/2022    MCV 73 (L) 06/22/2022     06/22/2022     Lab Results   Component Value Date    CREATININE 0.95 06/22/2022    ALBUMIN 4.6 06/22/2022    BILITOT 0.2 06/22/2022    ALKPHOS 68 06/22/2022    AST 35 06/22/2022    ALT 20 06/22/2022       Labs due: 10/2022    Next appt: 9/21/22    RX pended

## 2022-09-02 NOTE — TELEPHONE ENCOUNTER
Rx for Stelara received but upon processing now requires a PA. Pt aware and reports next dose due 9/13/22. Will route to [facility]. Will follow up.

## 2022-09-06 NOTE — TELEPHONE ENCOUNTER
Staff messaged provider's office regarding health condition, submitting reauthorization for Stelara, opening IVENT, I will continue to follow up.

## 2022-09-06 NOTE — TELEPHONE ENCOUNTER
Submitted reauthroization to [facility], I will follow up until determination is made.

## 2022-09-07 NOTE — TELEPHONE ENCOUNTER
Stelara has been reauthorized, approved until 9/6/23. Outgoing call to setup refill, patient currently has Uceris on profile at [phone], per previous chart notes patient takes it PRN, until speaking to Dr. Gonzalez, patient should hold Uceris.

## 2022-09-08 NOTE — TELEPHONE ENCOUNTER
Informed patient to hold Uceris until speaking to Dr. Gonzalez's, patient states she stopped taking Uceris when she found out she was pregnant. Patient reports Stelara is working well for her and she feels much better while on medication.    Specialty Pharmacy - Refill Coordination  Specialty Pharmacy - Medication/Referral Authorization    Specialty Medication Orders Linked to Encounter      Flowsheet Row Most Recent Value   Medication #1 ustekinumab (STELARA) 90 mg/mL Syrg syringe (Order#[id number], Rx#[id number])            Refill Questions - Documented Responses      Flowsheet Row Most Recent Value   Patient Availability and HIPAA Verification    Does patient want to proceed with activity? Yes   HIPAA/medical authority confirmed? Yes   Relationship to patient of person spoken to? Self   Refill Screening Questions    Changes to allergies? No   Changes to medications? No   New conditions since last clinic visit? No   Unplanned office visit, urgent care, ED, or hospital admission in the last 4 weeks? No   How does patient/caregiver feel medication is working? Good   Financial problems or insurance changes? No   How many doses of your specialty medications were missed in the last 4 weeks? 0   Would patient like to speak to a pharmacist? No   When does the patient need to receive the medication? 09/13/22   Refill Delivery Questions    How will the patient receive the medication? Delivery Merary   When does the patient need to receive the medication? 09/13/22   Shipping Address Home   Address in [facility] confirmed and updated if neccessary? Yes   Expected Copay ($) 0   Is the patient able to afford the medication copay? Yes   Payment Method zero copay   Days supply of Refill 56   Supplies needed? No supplies needed   Refill activity completed? Yes   Refill activity plan Refill scheduled   Shipment/Pickup Date: 09/08/22            Current Outpatient Medications   Medication Sig    acetaminophen (TYLENOL) 500 MG  tablet Take 1,000 mg by mouth as needed for Pain.    balsalazide (COLAZAL) 750 mg capsule Take 3 capsules (2,250 mg total) by mouth 2 (two) times a day. (Patient not taking: No sig reported)    cyclobenzaprine (FLEXERIL) 5 MG tablet Take 1 tablet by mouth nightly as needed for Muscle spasms.    doxylamine-pyridoxine, vit B6, (DICLEGIS) 10-10 mg TbEC Take 2 tablets by mouth every evening. (Patient not taking: Reported on 8/18/2022)    LIDOcaine (LIDODERM) 5 % Place 1 patch onto the skin daily as needed (pain).     lisdexamfetamine (VYVANSE) 70 MG capsule Take 70 mg by mouth every morning.    ondansetron (ZOFRAN-ODT) 4 MG TbDL Take 1 tablet (4 mg total) by mouth every 6 (six) hours as needed.    ondansetron (ZOFRAN-ODT) 4 MG TbDL Take 1 tablet (4 mg total) by mouth every 6 (six) hours as needed.    predniSONE (DELTASONE) 10 MG tablet TAKE 4 TABLETS BY MOUTH ONCE DAILY (Patient not taking: No sig reported)    prenatal vit 87-iron-folic-dha (PRENATE MINI, FERR ASP GLYCIN,) 18-1-350 mg Cap Take 1 tablet by mouth once daily.    prochlorperazine (COMPAZINE) 5 MG tablet Take by mouth.    promethazine (PHENERGAN) 25 MG tablet Take 1 tablet (25 mg total) by mouth every 4 (four) hours. (Patient not taking: Reported on 8/18/2022)    propranoloL (INDERAL) 20 MG tablet Take 20 mg by mouth 3 (three) times daily as needed (high blood pressure).     traMADoL (ULTRAM) 50 mg tablet Take 50 mg by mouth 3 (three) times daily as needed.    ustekinumab (STELARA) 90 mg/mL Syrg syringe Inject 1 mL (90 mg total) into the skin every 8 weeks.   Last reviewed on 8/18/2022 10:01 AM by Gladys Acevedo LPN    Review of patient's allergies indicates:  No Known Allergies Last reviewed on  8/30/2022 10:36 AM by Pooja Dickinson    Interventions added this encounter   Closed: [facility] Provider Intervention - Drug therapy appropriateness: ustekinumab (STELARA) 90 mg/mL Syrg syringe     Tasks added this encounter   10/31/2022 - Refill Call (Auto Added)    Tasks due within next 3 months   No tasks due.     Daysi Shaffer, PharmD  [facility]  [address]  [facility] [address]  Phone: [phone]  Fax: [phone]

## 2022-09-15 ENCOUNTER — ROUTINE PRENATAL (OUTPATIENT)
Dept: OBSTETRICS AND GYNECOLOGY | Facility: CLINIC | Age: 22
End: 2022-09-15
Payer: MEDICAID

## 2022-09-15 ENCOUNTER — TELEPHONE (OUTPATIENT)
Dept: OBSTETRICS AND GYNECOLOGY | Facility: CLINIC | Age: 22
End: 2022-09-15

## 2022-09-15 ENCOUNTER — HOSPITAL ENCOUNTER (OUTPATIENT)
Dept: RADIOLOGY | Facility: HOSPITAL | Age: 22
Discharge: HOME | End: 2022-09-15
Attending: SPECIALIST
Payer: MEDICAID

## 2022-09-15 VITALS — SYSTOLIC BLOOD PRESSURE: 110 MMHG | BODY MASS INDEX: 38.86 KG/M2 | DIASTOLIC BLOOD PRESSURE: 78 MMHG | WEIGHT: 267 LBS

## 2022-09-15 DIAGNOSIS — L73.9 FOLLICULITIS: Primary | ICD-10-CM

## 2022-09-15 DIAGNOSIS — Z3A.17 17 WEEKS GESTATION OF PREGNANCY: Primary | ICD-10-CM

## 2022-09-15 DIAGNOSIS — Z3A.13 13 WEEKS GESTATION OF PREGNANCY: ICD-10-CM

## 2022-09-15 LAB
BILIRUB SERPL-MCNC: NORMAL MG/DL
BLOOD URINE, POC: 50
CLARITY, POC UA: CLEAR
COLOR, POC UA: YELLOW
GLUCOSE UR QL STRIP: NORMAL
KETONES UR QL STRIP: NORMAL
LEUKOCYTE ESTERASE URINE, POC: NORMAL
NITRITE, POC UA: NORMAL
PH, POC UA: 6
PROTEIN, POC: NORMAL
SPECIFIC GRAVITY, POC UA: NORMAL
UROBILINOGEN, POC UA: NORMAL

## 2022-09-15 PROCEDURE — 76805 OB US >/= 14 WKS SNGL FETUS: CPT | Mod: 26,,, | Performed by: RADIOLOGY

## 2022-09-15 PROCEDURE — 76805 US OB 14+ WEEKS, TRANSABDOM, SINGLE GESTATION: ICD-10-PCS | Mod: 26,,, | Performed by: RADIOLOGY

## 2022-09-15 PROCEDURE — 76805 OB US >/= 14 WKS SNGL FETUS: CPT | Mod: TC,PN

## 2022-09-15 PROCEDURE — 99999 PR PBB SHADOW E&M-EST. PATIENT-LVL II: ICD-10-PCS | Mod: PBBFAC,,, | Performed by: OBSTETRICS & GYNECOLOGY

## 2022-09-15 PROCEDURE — 99213 OFFICE O/P EST LOW 20 MIN: CPT | Mod: TH,S$PBB,, | Performed by: OBSTETRICS & GYNECOLOGY

## 2022-09-15 PROCEDURE — 99212 OFFICE O/P EST SF 10 MIN: CPT | Mod: PBBFAC,25,PN | Performed by: OBSTETRICS & GYNECOLOGY

## 2022-09-15 PROCEDURE — 99213 PR OFFICE/OUTPT VISIT, EST, LEVL III, 20-29 MIN: ICD-10-PCS | Mod: TH,S$PBB,, | Performed by: OBSTETRICS & GYNECOLOGY

## 2022-09-15 PROCEDURE — 81002 URINALYSIS NONAUTO W/O SCOPE: CPT | Mod: PBBFAC,PN | Performed by: OBSTETRICS & GYNECOLOGY

## 2022-09-15 PROCEDURE — 99999 PR PBB SHADOW E&M-EST. PATIENT-LVL II: CPT | Mod: PBBFAC,,, | Performed by: OBSTETRICS & GYNECOLOGY

## 2022-09-15 RX ORDER — SULFAMETHOXAZOLE AND TRIMETHOPRIM 800; 160 MG/1; MG/1
1 TABLET ORAL 2 TIMES DAILY
Qty: 20 TABLET | Refills: 0 | Status: SHIPPED | OUTPATIENT
Start: 2022-09-15 | End: 2022-09-25

## 2022-09-15 NOTE — TELEPHONE ENCOUNTER
Cyst on clitoris, noticed after appt today. Discuss clitoris pain at visit today. States she has to pat it after urinating because it is painful. It is right under clitoris/red/painful to touch. A little smaller than an acorn, about the size of a bead. Please Advise.

## 2022-09-15 NOTE — TELEPHONE ENCOUNTER
----- Message from Carlos Bustillos sent at 9/15/2022  2:02 PM CDT -----  Contact: pt @ [phone]  Type:  Sooner Appointment Request  Caller is requesting a sooner appointment.  Caller declined first available appointment listed below.  Caller will not accept being placed on the waitlist and is requesting a message be sent to doctor.  Name of Caller:  pt   When is the first available appointment? October   Symptoms:  cyst on vagina  Best Call Back Number:  [phone]  Additional Information:  please call pt to advise.

## 2022-09-15 NOTE — PROGRESS NOTES
The patient presents with complaints of clitoral pains w intercourse- counseled.   Reports: Good fetal movements reported, No Bleeding or pains    9/15/2022  21 y.o. 17w4d Estimated Date of Delivery: 23, dating reviewed.   OB History    Para Term  AB Living   2 1 1 0 0 0   SAB IAB Ectopic Multiple Live Births   0 0 0 0 0      # Outcome Date GA Lbr Enrique/2nd Weight Sex Delivery Anes PTL Lv   2 Current            1 Term 19 37w1d  2.863 kg (6 lb 5 oz) F CS-LTranv EPI Y       Complications: Dysfunctional Labor       Prenatal labs reviewed and updated today    Review of Systems:  General ROS: negative for headache or visual changes  Breast ROS: negative for breast lumps  Gastrointestinal ROS: negative for  constipation, diarrhea or nausea/vomiting  Musculoskeletal ROS: negative for pain in joints or swelling in face or hands.   Neurological ROS: negative for - headaches, numbness/tingling or visual changes      Physical Exam:  /78   Wt 121.1 kg (266 lb 15.6 oz)   LMP 05/15/2022   BMI 38.86 kg/m²   Urine Dip: Pending  Fundal Height:19cm  Fetal Heart Tones: 158bpm  Constitutional: She is oriented to person, place, and time. She appears well-developed and well-nourished. No distress. Overweight   Cardiovascular: Normal rate.    Pulmonary/Chest: Effort normal. No respiratory distress  Abdominal: Soft, gravid, nontender. No rebound and no guarding.     Genitourinary: Deferred    Musculoskeletal: Normal range of motion, Minimal peripheral edema.   Neurological: She is alert and oriented to person, place, and time. Coordination normal.   Skin: Skin is warm and dry. She is not diaphoretic.  Psychiatric: She has a normal mood and affect.        Assessment:  21 y.o., at 17w4d Gestation   Patient Active Problem List   Diagnosis    Amniotic fluid leaking    S/P     Class 2 obesity in adult    Non-intractable vomiting    Intravascular volume depletion    Left leg numbness    Family  dynamics problem    Abdominal pain    Acute on chronic colitis    Chronic pancreatitis    Inflammatory bowel disease    Crohn's disease    Chronic anemia    Exacerbation of Crohn's disease    ACP (advance care planning)    Colitis    Crohn's colitis    Crohn's disease of both small and large intestine with fistula     Current Outpatient Medications on File Prior to Visit   Medication Sig Dispense Refill    prenatal vit 87-iron-folic-dha (PRENATE MINI, FERR ASP GLYCIN,) 18-1-350 mg Cap Take 1 tablet by mouth once daily. 100 capsule 3    ustekinumab (STELARA) 90 mg/mL Syrg syringe Inject 1 mL (90 mg total) into the skin every 8 weeks. 1 mL 0    doxylamine-pyridoxine, vit B6, (DICLEGIS) 10-10 mg TbEC Take 2 tablets by mouth every evening. (Patient not taking: No sig reported) 60 tablet 2    ondansetron (ZOFRAN-ODT) 4 MG TbDL Take 1 tablet (4 mg total) by mouth every 6 (six) hours as needed. (Patient not taking: Reported on 9/15/2022) 20 tablet 1    [DISCONTINUED] acetaminophen (TYLENOL) 500 MG tablet Take 1,000 mg by mouth as needed for Pain.      [DISCONTINUED] balsalazide (COLAZAL) 750 mg capsule Take 3 capsules (2,250 mg total) by mouth 2 (two) times a day. (Patient not taking: No sig reported) 180 capsule 11    [DISCONTINUED] cyclobenzaprine (FLEXERIL) 5 MG tablet Take 1 tablet by mouth nightly as needed for Muscle spasms.      [DISCONTINUED] LIDOcaine (LIDODERM) 5 % Place 1 patch onto the skin daily as needed (pain).       [DISCONTINUED] lisdexamfetamine (VYVANSE) 70 MG capsule Take 70 mg by mouth every morning.      [DISCONTINUED] ondansetron (ZOFRAN-ODT) 4 MG TbDL Take 1 tablet (4 mg total) by mouth every 6 (six) hours as needed. (Patient not taking: Reported on 9/15/2022) 20 tablet 1    [DISCONTINUED] predniSONE (DELTASONE) 10 MG tablet TAKE 4 TABLETS BY MOUTH ONCE DAILY (Patient not taking: No sig reported) 120 tablet 0    [DISCONTINUED] prochlorperazine (COMPAZINE) 5 MG tablet Take by  mouth.      [DISCONTINUED] promethazine (PHENERGAN) 25 MG tablet Take 1 tablet (25 mg total) by mouth every 4 (four) hours. (Patient not taking: No sig reported) 30 tablet 1    [DISCONTINUED] propranoloL (INDERAL) 20 MG tablet Take 20 mg by mouth 3 (three) times daily as needed (high blood pressure).       [DISCONTINUED] traMADoL (ULTRAM) 50 mg tablet Take 50 mg by mouth 3 (three) times daily as needed.       No current facility-administered medications on file prior to visit.         Plan:   Labs today: none  Orders today: none  MEds today: none  Procedures Today: u/s today for anatomy  Follow up 4 Weeks, bleeding/pain precautions , kick counts, labor precautions

## 2022-09-21 ENCOUNTER — OFFICE VISIT (OUTPATIENT)
Dept: GASTROENTEROLOGY | Facility: CLINIC | Age: 22
End: 2022-09-21
Payer: MEDICAID

## 2022-09-21 DIAGNOSIS — K50.813 CROHN'S DISEASE OF BOTH SMALL AND LARGE INTESTINE WITH FISTULA: Primary | ICD-10-CM

## 2022-09-21 PROCEDURE — 99214 PR OFFICE/OUTPT VISIT, EST, LEVL IV, 30-39 MIN: ICD-10-PCS | Mod: 95,,, | Performed by: INTERNAL MEDICINE

## 2022-09-21 PROCEDURE — 99214 OFFICE O/P EST MOD 30 MIN: CPT | Mod: 95,,, | Performed by: INTERNAL MEDICINE

## 2022-09-21 NOTE — Clinical Note
"She is 4 months pregnant on stelara so should be seen "ASAP".  I think she needs to be seen before February to educate on peripartum dosing of stelara, but January should be fine (would be 7 months)"

## 2022-09-21 NOTE — PROGRESS NOTES
[facility]  Inflammatory Bowel Disease   Clinic Note              Patient Name: Mindi Tate  Age: 21 y.o.  Sex: female  MRN: [id number]    TODAY'S DATE:  2022  LAST CLINIC DATE: 3/9/2022      Diagnosis: Crohn's disease of the small bowel and colon    HPI:  Mindi Tate is a 21 y.o. female with a history of schizoaffective disorder on wellbutrin, fistulizing crohn's ileocolitis on stelara who was seen for followup.    For review, patient was diagnosed with crohn's disease in  after being seen at [facility] for abdominal pain and blood per rectum.    Symptoms began about 2-3 months after birth of her daughter () in 2019, with 5-10 liquid BMs/day.  Does not recall having bleeding.  Remained symptomatic without care for about 9 months until being seen in the ER in  for abdominal pain which based off of imaging findings was thought to be secondary to pancreatitis at the time.  Of note, her diagnostic evaluation was still ongoing at that time, and she had had two colonoscopies over the previous 4 months, per chart incomplete given poor prep.   During that hospitalization colonoscopy was done showing only mild colitis in cecum.   Her diagnosis remained unclear, and she continued to be seen as an outpatient and treated for IBS-D.    More recently, in May of 2021 she was admitted with worsening abdominal pain, diarrhea, nausea and some rectal bleeding.  After endoscopy was done showing severe colitis, she was started on infliximab, and received two infusions at [facility] on [address]  She notes doing fine with her first infusion, and about 1 week later feeling left sided numbness/tingling, without weakness.  After her second infusion numbness progressed from mostly left upper extremity to also lower extremity, relating to similar sensation of sitting on the toilet for too long.  Last on prednisone while admitted with a short taper, and she has been off of  prednisone for about 2 mo.  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      When initially seen in clinic, she was having persistent nausea and abdominal pain, with only some relief with prochlorper and tramadol.  Pain was worse with eating.  Typically was having 5-10 BMs/day, with 2-3 nocturnal BMs.   Decision was made to begin stelara    2022:  Since establishing care in January, patient had her loading infusion of stelara on .  She remains on an unknown dose of prednisone, possibly 30mg daily, which she believes she has been on since October, but also unclear on timeline.   Does not have stelara pen at home, believes her next injection is next week (03/15).  Symptom wise, pain is markedly improved.  She does not feel that bowel frequency has improved much, still having 6-7 BMs/day.    Interim history:  Today patient notes no abdominal pain with Stelara.  She is 4.5 months pregnant, and she was previously taking balsalazide, but stopped since pregnancy.   At present she is having 2-3 bowel movements a day depending on diet, which has been variable with pregnancy. No blood in stool, no EIMs.    IBD Details:  Dx Date:  Formally diagnosed ; symptoms since   Disease type/distribution:  Small bowel and colon, possibly fistulizing    Disease Complications:    Extraintestinal manifestations:    Prior surgeries:      -2019 -    -2019 - cholecystectomy    Current Treatment:  stelara 90mg q8week  Start Date:  2022       Last dose: 22   Response:  --   Optimized:  --  Adverse reactions:  --  Therapeutic Drug Monitoring: --    Prior treatments:              Steroids:  multiple courses  5ASA:  no  IMM:    no  TNF Inh:     - Remicade; received two infusion (Dec 15 2021, Dec 31 2021), stopped due to right sided numbness/weakness after second infusion              Anti-Integrin:  no              IL 12/23:  currently  DUSTIN Inh:  no     Previous Clinical Trials:  no    CRP  Elevation:  6.6 during previous admission, 100.9 when established in clinic  calprotectin:  1030 10/13/21    Imaging:              MRE:  10/12/21 - Impression:     1. Extensive wall thickening in the cecum/terminal ileum with surrounding inflammation.  There appears to be a fistulous connection between a no other loop of small bowel and the cecum just distal to the terminal ileum.  No definite stricture or abrupt caliber changes identified and there are no frankly dilated bowel loops.  Extensive lymphadenopathy is also present a right lower quadrant.  Given the patient's history, this likely represents a Crohn's exacerbation with active inflammation and fistula formation, however given the mass effect and lymphadenopathy, underlying lesion is not entirely excluded.                CT:  --              Other:  --    Last Colonoscopy:    Colonoscopy 10/07/21 - Inflammation characterized by erosions, friability and granularity was found in a continuous and circumferential pattern from the splenic flexure to the ascending coon.  The splenic flexure and the mid ascenind colon were spared. This was moderate in severity.  A benign-appearing intrinsic severe setnosis was found in the mid ascending colon and was non-traversed  -Path: transverse colon - moderate chronic active inflammatory bowel disease      Colonoscopy, 10/12/21 - Findings:        numerous pseudopolyps erythema started at 65 cm just beyond splenic        flex? few polypoid erythematous lesions w surface ulcers this became        more intense 75 80 cm more pseudopolyps deep blind pouches ?        fistulae could not find lumen dw radiologist mri? refused gg enema        yesterday , difficultt to explain what a drastic change since min        non active colitis in cecum last year , unclear what was done when        went to [facility] last yr will need to get records tatoo of normal mucosa        distal to inflammation      Other Endoscopies:    EGD 05/05/21 - normal  esophagus.  Patchy mild inflammation found in the stomach. Biopsied. Normal duodenum.  -Path: focally active marked chronic gastritis positive for H. Pylori      Colonoscopy 10/15/2020 -      A localized area of severely congested, erythematous, eroded        (linear-pattern) and ulcerated mucosa was found in the cecum.        Biopsies were taken with a cold forceps for histology.   -Path: CECUM, BIOPSY:  CHRONIC INACTIVE COLITIS.  RIGHT COLON, BIOPSY:  COLONIC MUCOSA WITH FOCAL EROSION.     Comment:   The patient's history of inflammatory bowel disease is noted. All    biopsies are negative for dysplasia and granulomas.     ROS: Negative other than above      Review of patient's allergies indicates:  No Known Allergies    No outpatient medications have been marked as taking for the 22 encounter (Appointment) with Nba Gonzalez MD.       Past Medical History:   Diagnosis Date    ADHD (attention deficit hyperactivity disorder)     Anemia     Anemia     Anxiety     Crohn disease     Depression     H/O seasonal allergies     Pancreatitis 2019       Past Surgical History:   Procedure Laterality Date    ADENOIDECTOMY       SECTION Left 2019    Procedure:  SECTION;  Surgeon: Robbie Jaramillo MD;  Location: [facility] L&D;  Service: OB/GYN;  Laterality: Left;     SECTION      CHOLECYSTECTOMY      COLONOSCOPY N/A 10/15/2020    Procedure: COLONOSCOPY;  Surgeon: Olegario Lozano MD;  Location: [facility];  Service: Endoscopy;  Laterality: N/A;    COLONOSCOPY N/A 10/7/2021    Procedure: COLONOSCOPY- unable to reach cecum -poor prep;  Surgeon: Dre Hubbard MD;  Location: [facility];  Service: Endoscopy;  Laterality: N/A;    COLONOSCOPY N/A 10/12/2021    Procedure: COLONOSCOPY- no cecum -poor prep;  Surgeon: Olegario Lozano MD;  Location: [facility];  Service: Endoscopy;  Laterality: N/A;    ESOPHAGOGASTRODUODENOSCOPY N/A 2021    Procedure: EGD (ESOPHAGOGASTRODUODENOSCOPY);   Surgeon: Emanuel Jackson MD;  Location: [facility] ENDO;  Service: Endoscopy;  Laterality: N/A;    LAPAROSCOPIC CHOLECYSTECTOMY N/A 11/29/2019    Procedure: CHOLECYSTECTOMY, LAPAROSCOPIC;  Surgeon: Jewel Cutler MD;  Location: [facility] OR;  Service: General;  Laterality: N/A;    TONSILLECTOMY  2011    UPPER GASTROINTESTINAL ENDOSCOPY         Family History   Problem Relation Age of Onset    Breast cancer Paternal Aunt     Multiple sclerosis Father     Seizures Father     Asthma Sister     Crohn's disease Brother     Ovarian cancer Neg Hx        Social History     Socioeconomic History    Marital status: Single   Tobacco Use    Smoking status: Never    Smokeless tobacco: Never   Substance and Sexual Activity    Alcohol use: Not Currently     Comment: socially    Drug use: No    Sexual activity: Not Currently     Partners: Male         Vital Signs:  LMP 05/15/2022      General: Awoke and orientedx3, in no acute distress  HEENT: Normocephalic, atruamatic, Moist mucous membranes  CV: Regular rate and rhythm, no JVD  Pulm: Normal inspiratory effort, no audible wheezing  Abdomen: nondistended abdomen without rebound or guarding  Extremities: No clubbing, cyanosis or edema  Psych: Normal affect. Good eye contact     Labs:   Lab Results   Component Value Date    CRP 1.6 03/09/2022    CALPROTECTIN 2030 (H) 10/13/2021     Lab Results   Component Value Date    HEPBSAG Negative 10/13/2021     No results found for: TBGOLDPLUS  Lab Results   Component Value Date    [id number] 578 01/25/2022     Lab Results   Component Value Date    WBC 8.58 06/22/2022    HGB 11.1 (L) 06/22/2022    HCT 36.3 (L) 06/22/2022    MCV 73 (L) 06/22/2022     06/22/2022     Lab Results   Component Value Date    CREATININE 0.95 06/22/2022    ALBUMIN 4.6 06/22/2022    BILITOT 0.2 06/22/2022    ALKPHOS 68 06/22/2022    AST 35 06/22/2022    ALT 20 06/22/2022       Assessment/Plan:  Mindi Tate is a 21 y.o. female with a history of schizoaffective disorder  on wellbutrin, fistulizing crohn's ileocolitis on stelara who was seen for followup    # Crohn's disease of both small and large intestine with fistula [K50.813]    Symptoms started in January of 2019 postpartum, however only formally diagnosed in the past 3 months at [facility].  She appears to have severe ileocolitis, possibly with fistulizing disease, and was started on infliximab.  Unfortunately after 2 doses in December she sounds to have had an infusion reaction with numbness/tingling, which has mostly resolved after holding for a month.  As such we decided to proceed with stelara, which she received loading of in February     Since beginning Stelara patient is weaned off of steroids and is describing clinical remission, with 2 formed bowel movements a day, no blood in stool, no abdominal pain.  She is now 4 months pregnant, so we spent the majority of our visit discussing IBD in pregnancy.  In particular I educated her on the safety of Stelara, and that the greatest risk to fetus is poorly controlled disease.  As such I emphasize the importance of her staying on Stelara in reaching out if she has a change in her symptoms.  She is due for labs, which I also ordered today.    -- Labs today: CBC, CMP, CRP  -- Continue stelara 90mg [id number]  -- Stop Balsalazide   -- Endoscopy: CRC screening q1-2 years beginning 8 years after diagnosis (2017).  To be done after pregnancy    RTC* 6 mo    Thank you for involving us in the care of this patient.        Nba Gonzalez MD  Department of Gastroenterology  Inflammatory Bowel Disease

## 2022-10-07 ENCOUNTER — TELEPHONE (OUTPATIENT)
Dept: GASTROENTEROLOGY | Facility: CLINIC | Age: 22
End: 2022-10-07
Payer: MEDICAID

## 2022-10-07 NOTE — TELEPHONE ENCOUNTER
----- Message from Pooja Dickinson RN sent at 10/7/2022  4:19 PM CDT -----  Regarding: FW: Call back  Contact: [phone]    ----- Message -----  From: Yelena Boo MA  Sent: 10/7/2022   4:15 PM CDT  To: Pooja Dickinson RN  Subject: FW: Call back                                    I think this is your Dr Rodríguez. She said you were supposed to get her in before January because she is pregnant.  ----- Message -----  From: Karol Hayes  Sent: 10/7/2022   3:40 PM CDT  To: Mar[facility] SÁNCHEZ Staff  Subject: Call back                                        Pt is calling back to get schedule. She stated she had VM telling her to call for an appointment Please call back to discuss further @ [phone]. She also stated that you can leave a message in her MyChart

## 2022-10-07 NOTE — TELEPHONE ENCOUNTER
Called and spoke to patient appointment schedule for 12/15/222 at 9:00  -pt expressed understanding   -all questions and concerns were answered

## 2022-10-13 ENCOUNTER — ROUTINE PRENATAL (OUTPATIENT)
Dept: OBSTETRICS AND GYNECOLOGY | Facility: CLINIC | Age: 22
End: 2022-10-13
Payer: MEDICAID

## 2022-10-13 VITALS — BODY MASS INDEX: 40.5 KG/M2 | WEIGHT: 278.25 LBS | SYSTOLIC BLOOD PRESSURE: 118 MMHG | DIASTOLIC BLOOD PRESSURE: 64 MMHG

## 2022-10-13 DIAGNOSIS — Z3A.21 21 WEEKS GESTATION OF PREGNANCY: Primary | ICD-10-CM

## 2022-10-13 LAB
BILIRUB SERPL-MCNC: NORMAL MG/DL
BLOOD URINE, POC: NORMAL
CLARITY, POC UA: NORMAL
COLOR, POC UA: NORMAL
GLUCOSE UR QL STRIP: NORMAL
KETONES UR QL STRIP: NORMAL
LEUKOCYTE ESTERASE URINE, POC: POSITIVE
NITRITE, POC UA: NORMAL
PH, POC UA: 6
PROTEIN, POC: NORMAL
SPECIFIC GRAVITY, POC UA: NORMAL
UROBILINOGEN, POC UA: NORMAL

## 2022-10-13 PROCEDURE — 99212 OFFICE O/P EST SF 10 MIN: CPT | Mod: PBBFAC,PN | Performed by: SPECIALIST

## 2022-10-13 PROCEDURE — 99213 PR OFFICE/OUTPT VISIT, EST, LEVL III, 20-29 MIN: ICD-10-PCS | Mod: TH,S$PBB,, | Performed by: SPECIALIST

## 2022-10-13 PROCEDURE — 99999 PR PBB SHADOW E&M-EST. PATIENT-LVL II: ICD-10-PCS | Mod: PBBFAC,,, | Performed by: SPECIALIST

## 2022-10-13 PROCEDURE — 99999 PR PBB SHADOW E&M-EST. PATIENT-LVL II: CPT | Mod: PBBFAC,,, | Performed by: SPECIALIST

## 2022-10-13 PROCEDURE — 99213 OFFICE O/P EST LOW 20 MIN: CPT | Mod: TH,S$PBB,, | Performed by: SPECIALIST

## 2022-10-13 PROCEDURE — 81002 URINALYSIS NONAUTO W/O SCOPE: CPT | Mod: PBBFAC,PN | Performed by: SPECIALIST

## 2022-10-13 NOTE — PROGRESS NOTES
Complaints today: none , Good fetal movements reported No Bleeding or pains    /64   Wt 126.2 kg (278 lb 3.5 oz)   LMP 05/15/2022   BMI 40.50 kg/m²     22 y.o., at 21w4d by Estimated Date of Delivery: 23  Patient Active Problem List   Diagnosis    Amniotic fluid leaking    S/P     Class 2 obesity in adult    Non-intractable vomiting    Intravascular volume depletion    Left leg numbness    Family dynamics problem    Abdominal pain    Acute on chronic colitis    Chronic pancreatitis    Inflammatory bowel disease    Crohn's disease    Chronic anemia    Exacerbation of Crohn's disease    ACP (advance care planning)    Colitis    Crohn's colitis    Crohn's disease of both small and large intestine with fistula     OB History    Para Term  AB Living   2 1 1 0 0 0   SAB IAB Ectopic Multiple Live Births   0 0 0 0 0      # Outcome Date GA Lbr Enrique/2nd Weight Sex Delivery Anes PTL Lv   2 Current            1 Term 19 37w1d  2.863 kg (6 lb 5 oz) F CS-LTranv EPI Y       Complications: Dysfunctional Labor       Dating reviewed    Allergies and problem list reviewed and updated    Medical and surgical history reviewed    Prenatal labs reviewed and updated    Physical Exam:  ABD: soft, gravid, nontender,     Assessment:  IUP 21 w 4 d  Previous     Plan:   GD screen  Schedule repeat  for 39 weeks  follow up 4 Weeks, bleeding/pain precautions   kick counts, labor precautions

## 2022-10-31 ENCOUNTER — SPECIALTY PHARMACY (OUTPATIENT)
Dept: PHARMACY | Facility: CLINIC | Age: 22
End: 2022-10-31
Payer: MEDICAID

## 2022-10-31 DIAGNOSIS — K50.813 CROHN'S DISEASE OF BOTH SMALL AND LARGE INTESTINE WITH FISTULA: ICD-10-CM

## 2022-10-31 NOTE — TELEPHONE ENCOUNTER
Refill request for stelara     Allergies reviewed     Drug Monitoring labs:  CBC and CMP every 6 months  TB Gold, HBsAG and HBcAb yearly       Lab Results   Component Value Date    HEPBSAG Negative 10/13/2021     No results found for: TBGOLDPLUS  Lab Results   Component Value Date    [id number] 578 01/25/2022     Lab Results   Component Value Date    WBC 8.58 06/22/2022    HGB 11.1 (L) 06/22/2022    HCT 36.3 (L) 06/22/2022    MCV 73 (L) 06/22/2022     06/22/2022     Lab Results   Component Value Date    CREATININE 0.95 06/22/2022    ALBUMIN 4.6 06/22/2022    BILITOT 0.2 06/22/2022    ALKPHOS 68 06/22/2022    AST 35 06/22/2022    ALT 20 06/22/2022       Labs due:  12/2022    Next appt: 12/15/22    RX pended

## 2022-10-31 NOTE — TELEPHONE ENCOUNTER
Outgoing call regarding Dante. Pt stated she is due to inject on 11/8/22. Informed pt refill request has been send to Provider for approval and will follow up for status on 11/3/22. Pt verbalized understanding.

## 2022-11-01 RX ORDER — USTEKINUMAB 90 MG/ML
90 INJECTION, SOLUTION SUBCUTANEOUS
Qty: 1 ML | Refills: 0 | Status: SHIPPED | OUTPATIENT
Start: 2022-11-01 | End: 2022-11-28 | Stop reason: SDUPTHER

## 2022-11-02 ENCOUNTER — TELEPHONE (OUTPATIENT)
Dept: OBSTETRICS AND GYNECOLOGY | Facility: CLINIC | Age: 22
End: 2022-11-02
Payer: MEDICAID

## 2022-11-03 ENCOUNTER — TELEPHONE (OUTPATIENT)
Dept: GASTROENTEROLOGY | Facility: CLINIC | Age: 22
End: 2022-11-03
Payer: MEDICAID

## 2022-11-03 ENCOUNTER — PATIENT MESSAGE (OUTPATIENT)
Dept: GASTROENTEROLOGY | Facility: CLINIC | Age: 22
End: 2022-11-03
Payer: MEDICAID

## 2022-11-03 DIAGNOSIS — K50.813 CROHN'S DISEASE OF BOTH SMALL AND LARGE INTESTINE WITH FISTULA: Primary | ICD-10-CM

## 2022-11-03 NOTE — TELEPHONE ENCOUNTER
Incoming call from patient, patient reports she is feeling much better with resolution in her N/V and abdominal pain, she believes it just was one occurrence. Patient was prescribed Keflex but has not yet picked up from the pharmacy, advised patient [facility] will reach out to provider's office for guidance. Patient repeated understanding, dose for Stelara is due on 11/8/22. Patient states she is on lunch break at 11:30 am and prefers calls around that time.    Staff messaged MDO.

## 2022-11-03 NOTE — TELEPHONE ENCOUNTER
----- Message from Derek Rodríguez MD sent at 11/3/2022 11:52 AM CDT -----  Regarding: RE: ED Visit and Keflex  Pooja  Please call patient asap.      She is a pt of Dr. Gonzalez who was last seen 9/2022 and has not yet established with me with appt iin 12/2022    Daysi- thanks for reaching out    SS  ----- Message -----  From: Daysi Shaffer, PharmD  Sent: 11/3/2022   9:21 AM CDT  To: Derek Rodríguez MD, Lisset Arana, PharmD, #  Subject: ED Visit and Keflex                              Hi Dr. Rodríguez and Staff,    Ms. Tate had a ED visit yesterday, she was nauseated, vomiting and had abdominal pain.  She was prescribed Keflex at discharge but has not started taking the antibiotic, her next injection for Stelara was due 11/08/22 and she is 25 weeks pregnant.  Today she reports she is feeling better, with resolution in her nausea and abdominal pain, would you advise the patient to hold ABX until after Stelara dose, or to hold Stelara until completion of ABX, or did someone want to reach out to the patient and speak with her. If someone does reach out to patient she says her lunch break is at 11:30 am or [facility] messages preferred thank you.    Daysi Shaffer  Clinical Pharmacist  [facility]  P. [phone]  F. [phone]

## 2022-11-03 NOTE — TELEPHONE ENCOUNTER
Called & spoke to pt  - Unsure why Keflex was prescribed by ED  - Denies burning w/ urination or dysuria  - Asked if she was still having symptoms of nausea, vomiting, or abdominal pain since discharge  - Pt stated she was at work and told the lady who she spoke with before to send her a [employer] message for communication because she is at work  - Informed pt it is best to do symptom updates by phone in case there are follow-up questions  - Asked if I could have a few more seconds of her time regarding BM; pt stated no I am at work send me a message  - Line disconnected

## 2022-11-07 NOTE — TELEPHONE ENCOUNTER
Specialty Pharmacy - Refill Coordination    Specialty Medication Orders Linked to Encounter      Flowsheet Row Most Recent Value   Medication #1 ustekinumab (STELARA) 90 mg/mL Syrg syringe (Order#[id number], Rx#[id number])            Refill Questions - Documented Responses      Flowsheet Row Most Recent Value   Patient Availability and HIPAA Verification    Does patient want to proceed with activity? Yes   HIPAA/medical authority confirmed? Yes   Relationship to patient of person spoken to? Self   Refill Screening Questions    Changes to allergies? No   Changes to medications? Yes   New conditions since last clinic visit? No   Unplanned office visit, urgent care, ED, or hospital admission in the last 4 weeks? No   How does patient/caregiver feel medication is working? Good   Financial problems or insurance changes? No   How many doses of your specialty medications were missed in the last 4 weeks? 0   Would patient like to speak to a pharmacist? No   When does the patient need to receive the medication? 11/07/22   Refill Delivery Questions    How will the patient receive the medication? MEDRx   When does the patient need to receive the medication? 11/07/22   Shipping Address Home   Address in [facility] confirmed and updated if neccessary? Yes   Expected Copay ($) 0   Is the patient able to afford the medication copay? Yes   Payment Method zero copay   Days supply of Refill 28   Supplies needed? No supplies needed   Refill activity completed? Yes   Refill activity plan Refill scheduled   Shipment/Pickup Date: 11/07/22            Current Outpatient Medications   Medication Sig    cephALEXin (KEFLEX) 500 MG capsule Take 1 capsule (500 mg total) by mouth every 6 (six) hours. for 7 days    doxylamine-pyridoxine, vit B6, (DICLEGIS) 10-10 mg TbEC Take 2 tablets by mouth every evening. (Patient not taking: No sig reported)    ondansetron (ZOFRAN-ODT) 4 MG TbDL Take 1 tablet (4 mg total) by mouth every 6 (six) hours  as needed. (Patient not taking: No sig reported)    prenatal vit 87-iron-folic-dha (PRENATE MINI, FERR ASP GLYCIN,) 18-1-350 mg Cap Take 1 tablet by mouth once daily.    ustekinumab (STELARA) 90 mg/mL Syrg syringe Inject 1 mL (90 mg total) into the skin every 8 weeks.   Last reviewed on 11/2/2022  8:33 AM by Greg Desir, RN    Review of patient's allergies indicates:  No Known Allergies Last reviewed on  11/2/2022 8:33 AM by Greg Desir    Interventions added this encounter   Open: [facility] Patient Intervention - Drug safety: ustekinumab (STELARA) 90 mg/mL Syrg syringe     Tasks added this encounter   11/28/2022 - Refill Call (Auto Added)   Tasks due within next 3 months   12/22/2022 - Clinical - Follow Up Assesement (Annual)     Daysi Shaffer, PharmD  [facility]  [address]  [facility] [address]  Phone: [phone]  Fax: [phone]

## 2022-11-10 ENCOUNTER — ROUTINE PRENATAL (OUTPATIENT)
Dept: OBSTETRICS AND GYNECOLOGY | Facility: CLINIC | Age: 22
End: 2022-11-10
Payer: MEDICAID

## 2022-11-10 ENCOUNTER — LAB VISIT (OUTPATIENT)
Dept: LAB | Facility: HOSPITAL | Age: 22
End: 2022-11-10
Attending: SPECIALIST
Payer: MEDICAID

## 2022-11-10 VITALS
SYSTOLIC BLOOD PRESSURE: 110 MMHG | WEIGHT: 288.56 LBS | DIASTOLIC BLOOD PRESSURE: 60 MMHG | BODY MASS INDEX: 42.62 KG/M2

## 2022-11-10 DIAGNOSIS — Z3A.21 21 WEEKS GESTATION OF PREGNANCY: ICD-10-CM

## 2022-11-10 DIAGNOSIS — Z3A.25 25 WEEKS GESTATION OF PREGNANCY: Primary | ICD-10-CM

## 2022-11-10 LAB
BILIRUBIN, UA POC OHS: NEGATIVE
BLOOD, UA POC OHS: NEGATIVE
CLARITY, UA POC OHS: CLEAR
COLOR, UA POC OHS: YELLOW
GLUCOSE SERPL-MCNC: 110 MG/DL (ref 70–140)
GLUCOSE, UA POC OHS: NEGATIVE
KETONES, UA POC OHS: NEGATIVE
LEUKOCYTES, UA POC OHS: NEGATIVE
NITRITE, UA POC OHS: NEGATIVE
PH, UA POC OHS: 7
PROTEIN, UA POC OHS: NEGATIVE
SPECIFIC GRAVITY, UA POC OHS: 1.02
UROBILINOGEN, UA POC OHS: 0.2

## 2022-11-10 PROCEDURE — 99213 OFFICE O/P EST LOW 20 MIN: CPT | Mod: TH,S$PBB,, | Performed by: SPECIALIST

## 2022-11-10 PROCEDURE — 36415 COLL VENOUS BLD VENIPUNCTURE: CPT | Mod: PN | Performed by: SPECIALIST

## 2022-11-10 PROCEDURE — 81003 URINALYSIS AUTO W/O SCOPE: CPT | Mod: PBBFAC,PN | Performed by: SPECIALIST

## 2022-11-10 PROCEDURE — 82950 GLUCOSE TEST: CPT | Performed by: SPECIALIST

## 2022-11-10 PROCEDURE — 99999 PR PBB SHADOW E&M-EST. PATIENT-LVL II: ICD-10-PCS | Mod: PBBFAC,,, | Performed by: SPECIALIST

## 2022-11-10 PROCEDURE — 99213 PR OFFICE/OUTPT VISIT, EST, LEVL III, 20-29 MIN: ICD-10-PCS | Mod: TH,S$PBB,, | Performed by: SPECIALIST

## 2022-11-10 PROCEDURE — 99999 PR PBB SHADOW E&M-EST. PATIENT-LVL II: CPT | Mod: PBBFAC,,, | Performed by: SPECIALIST

## 2022-11-10 PROCEDURE — 99212 OFFICE O/P EST SF 10 MIN: CPT | Mod: PBBFAC,PN | Performed by: SPECIALIST

## 2022-11-10 NOTE — LETTER
November 10, 2022    Mindi Tate  [address] [place] [id number]  Phone: [phone]  Fax: [phone]    To Whom It May Concern:    Ms. Tate is currently under our care for pregnancy.  Estimated Date of Delivery: 2/19/23        Sincerely,      Robbie Jaramillo MD

## 2022-11-10 NOTE — PROGRESS NOTES
"  Complaints today:completed GD screen  C/O "boli" at anal verge  NT No drainage or erythema  States present chronically, Good fetal movements reported No Bleeding or pains    /60   Wt 130.9 kg (288 lb 9.3 oz)   LMP 05/15/2022   BMI 42.62 kg/m²     22 y.o., at 25w4d by Estimated Date of Delivery: 23  Patient Active Problem List   Diagnosis    Amniotic fluid leaking    S/P     Class 2 obesity in adult    Non-intractable vomiting    Intravascular volume depletion    Left leg numbness    Family dynamics problem    Abdominal pain    Acute on chronic colitis    Chronic pancreatitis    Inflammatory bowel disease    Crohn's disease    Chronic anemia    Exacerbation of Crohn's disease    ACP (advance care planning)    Colitis    Crohn's colitis    Crohn's disease of both small and large intestine with fistula     OB History    Para Term  AB Living   2 1 1 0 0 0   SAB IAB Ectopic Multiple Live Births   0 0 0 0 0      # Outcome Date GA Lbr Enrique/2nd Weight Sex Delivery Anes PTL Lv   2 Current            1 Term 19 37w1d  2.863 kg (6 lb 5 oz) F CS-LTranv EPI Y       Complications: Dysfunctional Labor       Dating reviewed    Allergies and problem list reviewed and updated    Medical and surgical history reviewed    Prenatal labs reviewed and updated    Physical Exam:  ABD: soft, gravid, nontender,   EXAM Pilondal cyst at anal verge NT No crepitance no drainage    Assessment:  IUP 25 weeks  Pilonidal cyst    Plan:   Repeat u/s for growth and repeat views  GD screen  follow up 3 Weeks, bleeding/pain precautions   kick counts, labor precautions   Will follow pilonidal cyst PP      "

## 2022-11-22 ENCOUNTER — TELEPHONE (OUTPATIENT)
Dept: GASTROENTEROLOGY | Facility: CLINIC | Age: 22
End: 2022-11-22
Payer: MEDICAID

## 2022-11-22 NOTE — TELEPHONE ENCOUNTER
Patient was requesting a letter to work explaining her condition. Letter was sent by Dr. Gonzalez.

## 2022-11-22 NOTE — TELEPHONE ENCOUNTER
----- Message from Nba Gonzalez MD sent at 11/22/2022  1:53 PM CST -----  Regarding: RE: Letter for work  Contact: [phone]  Letter sent.  Thanks    ----- Message -----  From: Jamilah Martin MA  Sent: 11/22/2022   1:31 PM CST  To: Nba Gonzalez MD  Subject: FW: Letter for work                              Dr. Gonzalez, can you please advice   Thank You   ----- Message -----  From: Gila Brian  Sent: 11/22/2022  12:07 PM CST  To: Carlos GONZALES Staff  Subject: Letter for work                                  Pt is requesting a letter for work stating she has Crohn's disease and uses the restroom frequently.  She's asking this to be emailed to [email or web address] (Jeff Mandujano) Director of HR.    [phone] Pt's number.

## 2022-11-25 ENCOUNTER — PATIENT MESSAGE (OUTPATIENT)
Dept: OBSTETRICS AND GYNECOLOGY | Facility: CLINIC | Age: 22
End: 2022-11-25
Payer: MEDICAID

## 2022-11-28 DIAGNOSIS — K50.813 CROHN'S DISEASE OF BOTH SMALL AND LARGE INTESTINE WITH FISTULA: ICD-10-CM

## 2022-11-29 NOTE — TELEPHONE ENCOUNTER
Refill request for Stelara    Allergies reviewed     Drug Monitoring labs:  CBC/CMP q 6 months; TB & Hep B yearly    Lab Results   Component Value Date    HEPBSAG Negative 10/13/2021     No results found for: TBGOLDPLUS  Lab Results   Component Value Date    [id number] 578 01/25/2022     Lab Results   Component Value Date    WBC 9.79 11/02/2022    HGB 11.1 (L) 11/02/2022    HCT 34.0 (L) 11/02/2022    MCV 76 (L) 11/02/2022     11/02/2022     Lab Results   Component Value Date    CREATININE 0.53 11/02/2022    ALBUMIN 3.6 11/02/2022    BILITOT 0.5 11/02/2022    ALKPHOS 71 11/02/2022    AST 30 11/02/2022    ALT 14 11/02/2022       Labs due:  will get updated labs at time of transition appt    Next appt: 12/15/22- transition to Dr. Rodríguez    RX pended

## 2022-11-30 RX ORDER — USTEKINUMAB 90 MG/ML
90 INJECTION, SOLUTION SUBCUTANEOUS
Qty: 1 ML | Refills: 0 | Status: SHIPPED | OUTPATIENT
Start: 2022-11-30 | End: 2022-12-15 | Stop reason: SDUPTHER

## 2022-12-01 ENCOUNTER — TELEPHONE (OUTPATIENT)
Dept: OBSTETRICS AND GYNECOLOGY | Facility: CLINIC | Age: 22
End: 2022-12-01
Payer: MEDICAID

## 2022-12-01 ENCOUNTER — HOSPITAL ENCOUNTER (OUTPATIENT)
Dept: RADIOLOGY | Facility: HOSPITAL | Age: 22
Discharge: HOME | End: 2022-12-01
Attending: SPECIALIST
Payer: MEDICAID

## 2022-12-01 ENCOUNTER — PATIENT MESSAGE (OUTPATIENT)
Dept: OBSTETRICS AND GYNECOLOGY | Facility: CLINIC | Age: 22
End: 2022-12-01

## 2022-12-01 ENCOUNTER — ROUTINE PRENATAL (OUTPATIENT)
Dept: OBSTETRICS AND GYNECOLOGY | Facility: CLINIC | Age: 22
End: 2022-12-01
Payer: MEDICAID

## 2022-12-01 VITALS — DIASTOLIC BLOOD PRESSURE: 68 MMHG | WEIGHT: 293 LBS | BODY MASS INDEX: 43.72 KG/M2 | SYSTOLIC BLOOD PRESSURE: 122 MMHG

## 2022-12-01 DIAGNOSIS — O40.3XX0 POLYHYDRAMNIOS IN THIRD TRIMESTER COMPLICATION, SINGLE OR UNSPECIFIED FETUS: ICD-10-CM

## 2022-12-01 DIAGNOSIS — Z3A.25 25 WEEKS GESTATION OF PREGNANCY: ICD-10-CM

## 2022-12-01 DIAGNOSIS — Z3A.28 28 WEEKS GESTATION OF PREGNANCY: Primary | ICD-10-CM

## 2022-12-01 DIAGNOSIS — O36.63X0 EXCESSIVE FETAL GROWTH AFFECTING MANAGEMENT OF PREGNANCY IN THIRD TRIMESTER, SINGLE OR UNSPECIFIED FETUS: Primary | ICD-10-CM

## 2022-12-01 LAB
BILIRUBIN, UA POC OHS: NEGATIVE
BLOOD, UA POC OHS: NEGATIVE
CLARITY, UA POC OHS: CLEAR
COLOR, UA POC OHS: YELLOW
GLUCOSE, UA POC OHS: NEGATIVE
KETONES, UA POC OHS: NEGATIVE
LEUKOCYTES, UA POC OHS: NEGATIVE
NITRITE, UA POC OHS: NEGATIVE
PH, UA POC OHS: 6.5
PROTEIN, UA POC OHS: NEGATIVE
SPECIFIC GRAVITY, UA POC OHS: 1.02
UROBILINOGEN, UA POC OHS: 0.2

## 2022-12-01 PROCEDURE — 99212 OFFICE O/P EST SF 10 MIN: CPT | Mod: PBBFAC,PN | Performed by: SPECIALIST

## 2022-12-01 PROCEDURE — 99999 PR PBB SHADOW E&M-EST. PATIENT-LVL II: ICD-10-PCS | Mod: PBBFAC,,, | Performed by: SPECIALIST

## 2022-12-01 PROCEDURE — 76815 US OB LIMITED 1 OR MORE GESTATIONS: ICD-10-PCS | Mod: 26,,, | Performed by: RADIOLOGY

## 2022-12-01 PROCEDURE — 99999 PR PBB SHADOW E&M-EST. PATIENT-LVL II: CPT | Mod: PBBFAC,,, | Performed by: SPECIALIST

## 2022-12-01 PROCEDURE — 76815 OB US LIMITED FETUS(S): CPT | Mod: TC,PN

## 2022-12-01 PROCEDURE — 99213 OFFICE O/P EST LOW 20 MIN: CPT | Mod: TH,S$PBB,, | Performed by: SPECIALIST

## 2022-12-01 PROCEDURE — 99213 PR OFFICE/OUTPT VISIT, EST, LEVL III, 20-29 MIN: ICD-10-PCS | Mod: TH,S$PBB,, | Performed by: SPECIALIST

## 2022-12-01 PROCEDURE — 76815 OB US LIMITED FETUS(S): CPT | Mod: 26,,, | Performed by: RADIOLOGY

## 2022-12-01 PROCEDURE — 81003 URINALYSIS AUTO W/O SCOPE: CPT | Mod: PBBFAC,PN | Performed by: SPECIALIST

## 2022-12-01 NOTE — PROGRESS NOTES
Complaints today: generalized discomfort and LE edema, Good fetal movements reported No Bleeding or pains  Completing fetal growth u/s  Discussed GD screening    /68   Wt 134.3 kg (296 lb 1.2 oz)   LMP 05/15/2022   BMI 43.72 kg/m²     22 y.o., at 28w4d by Estimated Date of Delivery: 23  Patient Active Problem List   Diagnosis    Amniotic fluid leaking    S/P     Class 2 obesity in adult    Non-intractable vomiting    Intravascular volume depletion    Left leg numbness    Family dynamics problem    Abdominal pain    Acute on chronic colitis    Chronic pancreatitis    Inflammatory bowel disease    Crohn's disease    Chronic anemia    Exacerbation of Crohn's disease    ACP (advance care planning)    Colitis    Crohn's colitis    Crohn's disease of both small and large intestine with fistula     OB History    Para Term  AB Living   2 1 1 0 0 0   SAB IAB Ectopic Multiple Live Births   0 0 0 0 0      # Outcome Date GA Lbr Enrique/2nd Weight Sex Delivery Anes PTL Lv   2 Current            1 Term 19 37w1d  2.863 kg (6 lb 5 oz) F CS-LTranv EPI Y       Complications: Dysfunctional Labor       Dating reviewed    Allergies and problem list reviewed and updated    Medical and surgical history reviewed    Prenatal labs reviewed and updated    Physical Exam:  ABD: soft, gravid, nontender,     Assessment:  IUP 28 weeks    Plan:   Work restriction if possible  follow up 2 Weeks, bleeding/pain precautions   kick counts, labor precautions

## 2022-12-01 NOTE — TELEPHONE ENCOUNTER
----- Message from Ulisses Sevilla sent at 12/1/2022 11:37 AM CST -----  Contact: self  Type: Needs Medical Advice  Who Called: Patient   Best Call Back Number: [phone]  Additional Information: Pt states she that Dr. Jaramillo should be giving her a request form to leave and he should just write the letter to have  to him. But she has some confusion because Dr. Jaramillo states he needs a release form from her HR dept. Plz call pt today to clarify protocol. Thanks

## 2022-12-05 ENCOUNTER — PATIENT MESSAGE (OUTPATIENT)
Dept: OBSTETRICS AND GYNECOLOGY | Facility: CLINIC | Age: 22
End: 2022-12-05
Payer: MEDICAID

## 2022-12-14 PROCEDURE — 99358 PROLONG SERVICE W/O CONTACT: CPT | Mod: S$GLB,,, | Performed by: INTERNAL MEDICINE

## 2022-12-14 PROCEDURE — 99358 PR PROLONGED SERV,NO CONTACT,1ST HR: ICD-10-PCS | Mod: S$GLB,,, | Performed by: INTERNAL MEDICINE

## 2022-12-15 ENCOUNTER — OFFICE VISIT (OUTPATIENT)
Dept: GASTROENTEROLOGY | Facility: CLINIC | Age: 22
End: 2022-12-15
Payer: MEDICAID

## 2022-12-15 VITALS
OXYGEN SATURATION: 98 % | HEART RATE: 99 BPM | BODY MASS INDEX: 43.34 KG/M2 | HEIGHT: 69 IN | SYSTOLIC BLOOD PRESSURE: 102 MMHG | DIASTOLIC BLOOD PRESSURE: 48 MMHG | TEMPERATURE: 98 F | WEIGHT: 292.63 LBS

## 2022-12-15 DIAGNOSIS — D84.9 IMMUNOSUPPRESSED STATUS: ICD-10-CM

## 2022-12-15 DIAGNOSIS — K50.813 CROHN'S DISEASE OF BOTH SMALL AND LARGE INTESTINE WITH FISTULA: ICD-10-CM

## 2022-12-15 DIAGNOSIS — K76.0 NAFLD (NONALCOHOLIC FATTY LIVER DISEASE): ICD-10-CM

## 2022-12-15 DIAGNOSIS — K50.819 CROHN'S DISEASE OF SMALL AND LARGE INTESTINES WITH COMPLICATION: Primary | ICD-10-CM

## 2022-12-15 DIAGNOSIS — K86.2 PANCREAS CYST: ICD-10-CM

## 2022-12-15 DIAGNOSIS — K29.70 HELICOBACTER PYLORI GASTRITIS: ICD-10-CM

## 2022-12-15 DIAGNOSIS — Z3A.31 31 WEEKS GESTATION OF PREGNANCY: ICD-10-CM

## 2022-12-15 DIAGNOSIS — B96.81 HELICOBACTER PYLORI GASTRITIS: ICD-10-CM

## 2022-12-15 PROCEDURE — 99215 OFFICE O/P EST HI 40 MIN: CPT | Mod: 25,S$GLB,, | Performed by: INTERNAL MEDICINE

## 2022-12-15 PROCEDURE — 3008F BODY MASS INDEX DOCD: CPT | Mod: CPTII,S$GLB,, | Performed by: INTERNAL MEDICINE

## 2022-12-15 PROCEDURE — 1159F MED LIST DOCD IN RCRD: CPT | Mod: CPTII,S$GLB,, | Performed by: INTERNAL MEDICINE

## 2022-12-15 PROCEDURE — 3078F PR MOST RECENT DIASTOLIC BLOOD PRESSURE < 80 MM HG: ICD-10-PCS | Mod: CPTII,S$GLB,, | Performed by: INTERNAL MEDICINE

## 2022-12-15 PROCEDURE — 90686 FLU VACCINE (QUAD) GREATER THAN OR EQUAL TO 3YO PRESERVATIVE FREE IM: ICD-10-PCS | Mod: S$GLB,,, | Performed by: INTERNAL MEDICINE

## 2022-12-15 PROCEDURE — 90686 IIV4 VACC NO PRSV 0.5 ML IM: CPT | Mod: S$GLB,,, | Performed by: INTERNAL MEDICINE

## 2022-12-15 PROCEDURE — 3074F PR MOST RECENT SYSTOLIC BLOOD PRESSURE < 130 MM HG: ICD-10-PCS | Mod: CPTII,S$GLB,, | Performed by: INTERNAL MEDICINE

## 2022-12-15 PROCEDURE — 1159F PR MEDICATION LIST DOCUMENTED IN MEDICAL RECORD: ICD-10-PCS | Mod: CPTII,S$GLB,, | Performed by: INTERNAL MEDICINE

## 2022-12-15 PROCEDURE — 3008F PR BODY MASS INDEX (BMI) DOCUMENTED: ICD-10-PCS | Mod: CPTII,S$GLB,, | Performed by: INTERNAL MEDICINE

## 2022-12-15 PROCEDURE — 1160F PR REVIEW ALL MEDS BY PRESCRIBER/CLIN PHARMACIST DOCUMENTED: ICD-10-PCS | Mod: CPTII,S$GLB,, | Performed by: INTERNAL MEDICINE

## 2022-12-15 PROCEDURE — 90471 FLU VACCINE (QUAD) GREATER THAN OR EQUAL TO 3YO PRESERVATIVE FREE IM: ICD-10-PCS | Mod: S$GLB,,, | Performed by: INTERNAL MEDICINE

## 2022-12-15 PROCEDURE — 90471 IMMUNIZATION ADMIN: CPT | Mod: S$GLB,,, | Performed by: INTERNAL MEDICINE

## 2022-12-15 PROCEDURE — 3078F DIAST BP <80 MM HG: CPT | Mod: CPTII,S$GLB,, | Performed by: INTERNAL MEDICINE

## 2022-12-15 PROCEDURE — 99215 PR OFFICE/OUTPT VISIT, EST, LEVL V, 40-54 MIN: ICD-10-PCS | Mod: 25,S$GLB,, | Performed by: INTERNAL MEDICINE

## 2022-12-15 PROCEDURE — 3074F SYST BP LT 130 MM HG: CPT | Mod: CPTII,S$GLB,, | Performed by: INTERNAL MEDICINE

## 2022-12-15 PROCEDURE — 1160F RVW MEDS BY RX/DR IN RCRD: CPT | Mod: CPTII,S$GLB,, | Performed by: INTERNAL MEDICINE

## 2022-12-15 RX ORDER — ASCORBIC ACID, CHOLECALCIFEROL, DL-.ALPHA.-TOCOPHEROL ACETATE, THIAMINE HYDROCHLORIDE, RIBOFLAVIN, NIACINAMIDE, PYRIDOXINE HYDROCHLORIDE, FOLIC ACID, CYANOCOBALAMIN, CALCIUM CARBONATE, FERROUS FUMARATE, ZINC OXIDE, CUPRIC SULFATE 100; 400; 30; 1.6; 1.6; 20; 3.1; 1; 12; 200; 27; 10; 2 MG/1; [IU]/1; [IU]/1; MG/1; MG/1; MG/1; MG/1; MG/1; UG/1; MG/1; MG/1; MG/1; MG/1
1 TABLET, COATED ORAL
COMMUNITY
Start: 2022-07-23 | End: 2023-11-27

## 2022-12-15 RX ORDER — USTEKINUMAB 90 MG/ML
90 INJECTION, SOLUTION SUBCUTANEOUS
Qty: 1 ML | Refills: 2 | Status: SHIPPED | OUTPATIENT
Start: 2022-12-15 | End: 2022-12-22 | Stop reason: SDUPTHER

## 2022-12-15 NOTE — PATIENT INSTRUCTIONS
Instructions:  - flu shot today  - small frequent meals and dont' eat 4 hours before bedtime  - ask OB if they are okay with you taking over the counter medicine (pepcid, tagamet) and if not helpful we can consider omeprazole  - stool calprotectin  - stool H pylori   - check with OB regarding covid shots  - continue stelara every 8 weeks  - EGD/colonoscopy 2023  - MRE on same day a scope 2023  - labs 1/3/23 including stelara levels/abs- [facility]  - no live vaccines for  for first 6 mos  - f/u video visit with pharmacist   - let me know if any worsening or new symptoms- abd pain, diarrhea, constipation  - low residue diet  - breastfeeding safe with stelara  - schedule patient to see [facility] in cyst clinic or general [facility] clinic for pancreatitis and pancreas cyst  - Avoid all NSAIDs (Advil, Ibuprofen, Motrin, Aspirin, Naprosyn, Aleve)  - Yearly Pap Smears recommended if immunosuppressed- next due 2023  - Yearly Skin exam--wear sun block and hats- [facility] takes medicaid, you can do self skin exams-  [email or web address]   - Use antibiotics with caution and only take if necessary, please inform us of any infections or need for antibiotics   - No live vaccines if your are immunosuppressed  - Please avoid raw seafood (such as raw oysters or raw sushi) if you are immunosuppressed

## 2022-12-15 NOTE — PROGRESS NOTES
[facility]          Inflammatory Bowel Disease          New Patient Note         TODAY'S VISIT DATE:  12/15/2022    Reason for Consult:    Chief Complaint   Patient presents with    Crohn's Disease     PCP: [facility]      Referring MD:   Dr. Nba Gonzalez    History of Present Illness:    Dear. Dr. Nba Gonzalez    Thank you for your referral on your patient Mindi Tate who is a 22 y.o. female seen today at the [facility] on 12/15/2022. Pertinent past medical history includes ADHD, anxiety/depression, recurrent pancreatitis, pancreas tail cyst, NAFLD, GB sludge S/P cholecystectomy, HP positive gastritis (EGD 5/2021), h/o seasonal allergies.     As you know, Mindi Tate was doing well until 1/25/2019 when she presented to the ER with gluteal cleft abscess occurring 3 weeks postpartum dxed as pilonidal cyst.  It was incised and drained and treated with keflex followed by early recurrence 4/2019 and treated with amoxicillin.  In September 2019 patient presented to the ER with 1 month of constipation with normal x-ray and advised to take stool softeners and MiraLax with ultimate increase in fiber.In 10/2019 she was seen by GI, Dr. Jackson with complaints of abdominal pain that started 5 months prior and patient scheduled for colonoscopy.  She was seen 10 days later in the ER for vomiting with a dime-sized amount of blood after eating [employer] along with generalized abdominal pain with normal ultrasound.  On 10/29/2019 colonoscopy was performed though it was incomplete due to unsatisfactory bowel prep and advanced to sigmoid colon with stool in rectum/sigmoid precluding visualization.  Repeat colonoscopy 11/5/2019 again incomplete due to poor bowel prep and only advanced to the sigmoid colon.  Patient hospitalized after colonoscopy with diffuse abdominal pain from 11/6-11/12/19 with CT A/P significant for acute pancreatitis  and mild prominence of appendix treated conservatively.  Also CT Chest unremarkable for etiology of mid sternal pain. On 11/13/19 after discharge had abd pain and vomiting with CT A/P again showing ongoing pancreatitis and additional findings of thickened appendix with periappendiceal inflammatory changes, wall thickening of the cecum/terminal ileum with pericolonic inflammatory changes, GB sludge, fatty liver infiltration.  Ultrasound 11/2019 consistent with gallbladder sludge and hepatic steatosis.  Colonoscopy 11/20/2019 significant for pilonidal cyst with spontaneous drainage, normal rectum/sigmoid/descending colon extensive inflammation of the ascending and cecum though due to extensive inflammation difficult to visualize ICV/appendiceal orifice and TI not intubated.  Biopsies of cecum c/w minimal nonspecific crypt architectural distortion, right colon/left colon/rectum normal.  She was hospitalized 11/24/2019 to 12/8/2019 for recurrent pancreatitis and on 11/29/19 underwent a laparoscopic cholecystectomy with path c/w mild chronic cholecystitis, cholesterolosis.      Recurrent hospitalization 10/11-10/19/20 for syncope after having abdominal pain and nausea with CT A/P 10/11/2020 significant for acute pancreatitis, extensive RLQ stranding ass w/ cecum, large tubular stucture posterior to cecum ill definied and favoring inflammed appendix, muliptle RLQ LNs, moderate wall thickening of cecum, multiple mildly prominent loops of distal ileum, 2 x 3 cm hypoattenuating collection within are associated with the tail of the pancreas possibly pseudocyst.  SBFT 10/13/20 showed no SBO, paucity of contrast at the cecum/terminal ileum and with correlation with CT felt to be edematous wall thickening within this area causing narrowing of the lumen.  Colonoscopy 10/15/2020 significant for localized severely congested, erythematous, eroded and ulcerated mucosa in the cecum, fair bowel prep and ileum not intubated.  Biopsies  of cecum consistent with chronic inactive colitis and right colon with focal erosion.  She completed a course of IV antibiotics and general surgery did not feel that she needed any intervention. She was seen by Dr. Lozano for f/u 11/11/20 at which time she continued with constant crampy abdominal pain at which time labs, fecal calprotectin, MRE ordered.  She was hospitalized 5/3/22-5/5/22 for at which time she was seen at [facility] for associated pancreatitis with continued abdominal pain with nausea vomiting and diarrhea along with weight loss of 55 lb.  Her lipase >2000 and sx c/w acute pancreatitis and CT A/P 5/4/21 showed scar ventral wall thickening with surrounding inflammatory stranding and adjacent adenopathy of the cecum, stable pancreas cystic lesion in the tail of the pancreas.  EGD 5/5/2021 significant for HP positive chronic active gastritis but o/w normal and pt does not recall getting treated or having f/u discussion regarding this. Her pancreatitis was conservatively managed.  She was again hospitalized 10/6/21-10/15/2021 at which time CT A/P showed worsening appearance of RLQ inflammatory stranding and thickening of the cecum and adjacent SB with colonoscopy on 10/7/2021 significant for inflammation with erosions, friability and granularity in a continuous and circumferential pattern from the splenic flexure to the ascending colon with sparing of the splenic flexure and mid ascending colon, severe stenosis of the mid ascending colon that could not be traversed.  Biopsies of the right colon normal, transverse colon with moderate chronic active inflammation.  Abdominal x-ray 10/11/2021 normal with no obstruction.  MRE 10/12/2021 showed extensive wall thickening in the cecum/TI with surrounding inflammation, fistulous connection from distal terminal ileum to cecum, extensive lymphadenopathy.  Repeat colonoscopy 10/12/2021 consistent with numerous pseudopolyps with erythema starting at 65 cm just beyond  "the splenic flexure which became more intense from 75-80 cm though fistula could not be identified, diffuse area of severely congested, erythematous, eroded and pseudo polypoid mucosa in the ascending colon.  Biopsies of the ascending colon with moderate chronic active colitis, normal biopsies from every 10 cm from 10-50 cm. She was treated with corticosteroids with taper over 2 mos. She was then told she had Crohn's disease and treatment recommended. She started IFX (unclear if remicade or inflectra) 5 mg/kg 12/15/2021 and 1 week after her infusion had left sided numbness/tingling without weakness and proceeded with 2nd infusion 21 at which time this progressed to LUE and LLE and it took almost 2 mos for symptoms to completely resolve. At that time she did not see neurologist or have CT/MRI of brain. She was referred to me and seen by Dr. Gonzalez in IBD clinic 22 at which time she had nausea, abdominal pain, diarrhea up to 5-10 BMs/d with 2-3 nocturnal BMs, occ nonpruritic macules on LE.  Given this on 22 she was started on stelara. At her visit 3/2022 notes indicate she was on an unknown dose of prednisone and scheduled for first stelara injection 3/15/22.  Following the start of Stelara she felt that her abdominal pain had markedly improved though her bowel frequency had not she continued with 6-7 bowel movements /day in 2022 at which time she was also 4-1/2 months pregnant.  She had been weaned off of corticosteroids.  Her balsalazide was discontinued at her visit in 2022 and she continued on Stelara every 8 weeks (started 22, first injection 10/21/22, 1/3/22, 23). She is 31 weeks gestation and due date is 22 with plans for  22. Patient has significant back pain otherwise doing well and "morning sickness" has resolved.  Patient is having a baby boy and no growth issues and no BP or weight issues. She has 5 soft BMs/d of which 2 nocturnal BMs, " no blood.  She has had one episode of incontinence in 5/2022.  She has some left groin/pelvic intermittent but currently 6/10, radiates if baby kicks her to right breast, areola dry skin/tingling/burning. IN the past week went to hospital due to having a fall on her stomach and had dizziness/orthostasis and had nausea/vomiting day prior with no known sick contacts.  This has now resolved and told to stop zofran due to it causing constipation which was about 5 mos gestation. She has heartburn and not taking anything.      Prior Pertinent Surgeries:   11/29/19  laparoscopic cholecystectomy- GB sludge and recurrent pancreatitis but continued with recurrent pancreatitis after GB removal    Last pertinent Endoscopy/Imaging:  10/12/21 colonoscopy: numerous pseudopolyps with erythema starting at 65 cm just beyond the splenic flexure which became more intense from 75-80 cm though fistula could not be identified, diffuse area of severely congested, erythematous, eroded and pseudo polypoid mucosa in the ascending colon.  Biopsies of the ascending colon with moderate chronic active colitis, normal biopsies from every 10 cm from 10-50 cm.   10/12/21 MRE: extensive wall thickening in the cecum/TI with surrounding inflammation, fistulous connection from distal terminal ileum to cecum, extensive lymphadenopathy.     Therapeutic Drug Monitoring Labs:  None    Prior IBD Therapies:  Infliximab (unclear if remicade or inflectra per records, 5 mg/kg 12/15/21, 5 mg/kg 12/31/21)- discontinued due to left sided numbness/tingling that progressed to LUE/LLE after 2nd infusion, imaging of brain nor neurology consulted, discontinued   Prednisone- effective    Vaccinations:  Lab Results   Component Value Date    HEPBSAB <3.10 10/13/2021     No results found for: HEPBSURFABQU  No results found for: HEPAIGG  No results found for: VARICELLAZOS, VARICELLAINT  Immunization History   Administered Date(s) Administered    COVID-19, vector-nr, rS-Ad26,  PF ([place]) 10/25/2021    DTaP 01/12/1999, 02/22/1999, 05/26/1999, 01/11/2000, 09/25/2002    HIB 01/12/1999, 02/22/1999, 05/26/1999, 01/11/2000    Hepatitis B 1998, 1998, 08/25/1999    Hib-HbOC 01/11/2000    IPV 01/12/1999, 02/22/1999, 12/08/1999, 09/25/2002    Influenza 10/28/2011    Influenza (Flumist) - Quadrivalent - Intranasal *Preferred* (2-49 years old) 12/07/2015    Influenza - Quadrivalent - PF *Preferred* (6 months and older) 01/06/2019    Influenza - Trivalent (ADULT) 11/20/2002, 11/04/2003, 01/05/2005    Influenza - Trivalent - PF (ADULT) 11/20/2002, 11/04/2003, 01/05/2005, 10/28/2011    MMR 01/11/2000, 09/25/2002    Meningococcal Conjugate (MCV4P) 01/26/2010, 04/21/2015    OPV 12/08/1999    Rho (D) Immune Globulin 11/16/2018    Rho (D) Immune Globulin - IM 08/02/2018, 01/05/2019, 12/12/2022    Tdap 03/24/2010, 01/06/2019    Varicella 12/08/1999, 01/26/2010     Flu shot: recommended, will give today  COVID vaccine/booster: recommended   PCV 20: recommended  HPV:  not sure if pt has had this, defer to GYN  Hepatitis B: not immune, recommended    Hepatitis A:  will check immunity     MMR (live vaccine): will check immunity          Chickenpox status/Varicella (live vaccine):  will check immunity     Shingrix: recommended     Review of Systems   Constitutional:  Negative for chills, fever and weight loss.   HENT:          No oral ulcers, dysphagia, oral thrush   Eyes:  Negative for blurred vision, pain and redness.   Respiratory:  Negative for cough and shortness of breath.    Cardiovascular:  Negative for chest pain.   Gastrointestinal:  Positive for abdominal pain, heartburn, nausea and vomiting.   Genitourinary:  Negative for dysuria and hematuria.   Musculoskeletal:  Positive for back pain. Negative for joint pain.   Skin:  Negative for rash.   Psychiatric/Behavioral:  Negative for depression. The patient is not nervous/anxious and does not have insomnia.      Medical/Surgical History:     "has a past medical history of ADHD (attention deficit hyperactivity disorder), Anemia, Anemia, Anxiety, Crohn disease, Depression, H/O seasonal allergies, and Pancreatitis.    has a past surgical history that includes Tonsillectomy (); Adenoidectomy;  section (Left, 2019);  section; Laparoscopic cholecystectomy (N/A, 2019); Colonoscopy (N/A, 10/15/2020); Esophagogastroduodenoscopy (N/A, 2021); Colonoscopy (N/A, 10/7/2021); Colonoscopy (N/A, 10/12/2021); Upper gastrointestinal endoscopy; and Cholecystectomy.     Family History:   family history includes Asthma in her sister; Breast cancer in her paternal aunt; Crohn's disease in her brother; Multiple sclerosis in her father; Seizures in her father.     Social History:    reports that she has never smoked. She has never used smokeless tobacco. She reports that she does not currently use alcohol. She reports that she does not use drugs.     Review of patient's allergies indicates:  No Known Allergies    Current Medications:   Outpatient Medications Marked as Taking for the 12/15/22 encounter (Office Visit) with Derek Rodríguez MD   Medication Sig Dispense Refill    doxylamine-pyridoxine, vit B6, (DICLEGIS) 10-10 mg TbEC Take 2 tablets by mouth every evening. 60 tablet 2    ondansetron (ZOFRAN-ODT) 4 MG TbDL Take 1 tablet (4 mg total) by mouth every 6 (six) hours as needed. 20 tablet 1     27 mg iron- 1 mg Tab Take 1 tablet by mouth.      ustekinumab (STELARA) 90 mg/mL Syrg syringe Inject 1 mL (90 mg total) into the skin every 8 weeks. 1 mL 0      Vital Signs:  BP (!) 102/48 (BP Location: Left arm, Patient Position: Sitting)   Pulse 99   Temp 97.7 °F (36.5 °C)   Ht 5' 9" (1.753 m)   Wt 132.7 kg (292 lb 9.6 oz)   LMP 05/15/2022   SpO2 98%   BMI 43.21 kg/m²      Physical Exam  Cardiovascular:      Rate and Rhythm: Normal rate and regular rhythm.      Pulses: Normal pulses.      Heart sounds: Normal heart sounds. No murmur " heard.  Pulmonary:      Effort: Pulmonary effort is normal. No respiratory distress.      Breath sounds: Normal breath sounds. No wheezing.   Abdominal:      General: Bowel sounds are normal. There is no distension.      Palpations: Abdomen is soft.      Tenderness: There is no abdominal tenderness.      Comments: Palpable fundus   Musculoskeletal:      Right lower leg: No edema.      Left lower leg: No edema.     Labs: Reviewed  Lab Results   Component Value Date    CRP 1.6 2022    CALPROTECTIN 2030 (H) 10/13/2021     Lab Results   Component Value Date    HEPBSAG Negative 10/13/2021     No results found for: TBGOLDPLUS  Lab Results   Component Value Date    KCAULTSE42 578 2022     Lab Results   Component Value Date    WBC 9.79 2022    HGB 11.1 (L) 2022    HCT 34.0 (L) 2022    MCV 76 (L) 2022     2022     Lab Results   Component Value Date    CREATININE 0.53 2022    ALBUMIN 3.6 2022    BILITOT 0.5 2022    ALKPHOS 71 2022    AST 30 2022    ALT 14 2022     Assessment/Plan:  Mindi Tate is a 22 y.o. female with Crohn's disease ileum/colon (h/o gluteal cleft abscess 2019, transverse, ascending, cecum with extensive pseudoplyps, ascending colon stricture, ileum and fistula from TI to cecum), recurrent pancreatitis despite cholecystectomy, pancreas tail cyst, NAFLD, GB sludge S/P cholecystectomy, HP positive gastritis (EGD 2021) who is currently 31 weeks pregnant with planned  23 and on stelara every 8 weeks. She has advanced Crohn's disease and has not been restaged since starting stelara due to her pregnancy but relatively mild symptoms and progressing well through pregnancy with baby also growing well.  Today we discussed risks of prematurity and LBW infant and give she had active disease at time of conception will be important for her to let us know if she has any active symptoms develop with her Crohn's disease.  I  am concerned that her advanced disease including stricture/fistula may not respond to stelara treatment so I will anticipate getting proactive stelara levels and we will restage her disease with colonoscopy/MRE after she delivers with plans for 3/2023. I will also be sending her to pancreas clinic due to recurrent pancreatitis and stable pancreas cyst in setting of cholecystectomy.  We discussed that stelara is safe during pregnancy and her last dose is in good timing for delivery and to resume 2 weeks postpartum if no  wound infection and this will be cleared by our pharmacist prior to her dose on .  We discussed stelara is safe with breastfeeding and no live vaccines for  in first 6 mos of life.     # Crohn's disease ileum/colon (h/o gluteal cleft abscess 2019, transverse, ascending, cecum with extensive pseudoplyps, ascending colon stricture, ileum and fistula from TI to cecum)    - I had a long discussion with patient regarding epidemiology, potential etiologies, associations and triggers (avoiding NSAIDS, using antibiotics with caution,stress and smoking effects on disease state), diagnosis, management goals and treatment options   - pregnancy (31 weeks, planned  23)- pregnancy progressing favorably without issues, pt to notify us if any change in symptoms, stelara safe during pregnancy and breastfeeding, timing prior and after  of stelara should be fine but we will clear with OB prior to resuming postpartum, OB MD Dr. Richardson  - pt to let me know if any worsening or new symptoms- abd pain, diarrhea, constipation  - ascending colon stricture- advised to chew food well and low residue diet  - stool calprotectin  - EGD/colonoscopy 2023  - MRE on same day a scope 2023  - continue stelara q 8 weeks- discussed risks/MOA   - pt advised to avoid all NSAIDs (Advil, Ibuprofen, Motrin, Aspirin, Naprosyn, Aleve)  - pt told to use antibiotics with caution and only take if  necessary and will inform us of any infections or need for antibiotics   - pt advised to avoid raw seafood (such as raw oysters or raw sushi)  - smoking status: never smoked  - basic labs: CBC with iron studies, CMP, CRP, HCV Ab, HIV, vitamin B12, TSH, TTG IgA/total serum IgA  - drug monitoring labs: TPMT, TB quantiferon, Hep B testing (HBsAg, HBtotalcoreAb)  - TDM:  1/3/23 trough stelara levels/abs- [facility]    # Pancreas issues  - recurrent pancreatitis with recurrence despite cholecystectomy for GB sludge  - no alcohol use  - pancreas tail cyst on imaging, stable  - labs for pancreatitis w/u- calcium, IgG4  - referral to pancreas/AES clinic      # H pylori gastritis (EGD 5/2021)  - pt does not recall getting treated for this and I don't have records indicating treatment  - stool H pylori Ag and then will consider treating postpartum  - EGD with gastric bx at time of colonsocopy     # GERD:  - worse with pregnancy  - advise small frequent meals and not to eat 4 hours before bedtime  - pt to consider starting H2 blocker and will clear with her OB before starting though if ineffective we can consider PPI    # Fatty liver disease  - on imaging in past  - weight loss after delivery   - monitor LFTs    # IBD specific health maintenance:  CRC risk: sx 2019,   Skin exam: use sunblock/hats/sunprotective clothing- yearly  Risk for osteopenia/osteoporosis-none  Pap smear- yearly  Vitamin D- will check   Vaccines: flu shot today, covid booster recommended, will check immunity to hep A, varicella and MMR    Follow up: f/u video visit with pharmacist 2/26 prior to resuming stelara postpartum, with me 4/2023    Thank you again for sending Mindi Tate to see Dr. Derek Rodríguez today at the [facility]. Please don't hesitate to contact Dr. Rodríguez if there are any questions regarding this evaluation, or if you have any other patients with inflammatory bowel disease for whom you would like a  consultation. You can reach Dr. Rodríguez at [phone] or by email at [email or web address]    I spent a total of 60 minutes on the day of the visit.This includes face to face time and on-face to face time preparing to see the patient (eg, review of tests, notes), obtaining and/or reviewing separately obtained history, documenting clinical information in the electronic or other health record, independently interpreting results and communicating results to the patient/family/caregiver, and coordinating care.     Derek Rodríguez MD  Department of Gastroenterology  Medical Director, Inflammatory Bowel Disease

## 2022-12-15 NOTE — PROGRESS NOTES
IBD PATIENT INTAKE:    COVID symptoms in the last 7 days (runny nose, sore throat, congestion, cough, fever): No  PCP: [facility]  If not PCP-  number given to establish [phone]: No    ALLERGIES REVIEWED:  Yes    CHIEF COMPLAINT:    Chief Complaint   Patient presents with    Crohn's Disease       VITAL SIGNS:  LMP 05/15/2022      Change in medical, surgical, family or social history: N/A    IBD THERAPY (name, dose/frequency):  Stelara Q 8 weeks   Last dose:  10/21/22    Next dose:  1/3/2023  Infusion/Pharmacy: [facility] ([facility])    NSAIDs (aspirin, ibuprofen-advil or motrin, naproxen-aleve, diclofenac-voltaren, BC powder, excedrin, goodies): No    Alternative/Complementary Medications (i.e. probiotics, turmeric, fish oil, aloe vera):      no  Name/dose:  none    Vitamins:   Vit D:  no     Vit B-12:  no   Folic Acid: no       Calcium: no     Iron:  no      MVI: yes     Antibiotics (past 30 Days):  no  If yes   Indication:  Name of antibiotic:  Completion date:     REVIEWED MEDICATION LIST RECONCILED INCLUDING ABOVE MEDS:  yes      Will be 31 weeks pregnant on tomorrow             Answers submitted by the patient for this visit:  New Patient Questionnaire  (Submitted on 12/15/2022)  abdominal pain: Yes  nausea: Yes  vomiting: Yes  heartburn: Yes  Joint pain? : Yes  back pain: Yes

## 2022-12-17 PROBLEM — K86.2 PANCREAS CYST: Status: ACTIVE | Noted: 2022-12-17

## 2022-12-17 PROBLEM — K52.9 ACUTE ON CHRONIC COLITIS: Status: RESOLVED | Noted: 2020-10-11 | Resolved: 2022-12-17

## 2022-12-17 PROBLEM — D84.9 IMMUNOSUPPRESSED STATUS: Status: ACTIVE | Noted: 2022-12-17

## 2022-12-17 PROBLEM — R11.10 NON-INTRACTABLE VOMITING: Status: RESOLVED | Noted: 2019-11-25 | Resolved: 2022-12-17

## 2022-12-17 PROBLEM — K50.90 EXACERBATION OF CROHN'S DISEASE: Status: RESOLVED | Noted: 2021-10-06 | Resolved: 2022-12-17

## 2022-12-17 PROBLEM — R20.0 LEFT LEG NUMBNESS: Status: RESOLVED | Noted: 2019-12-01 | Resolved: 2022-12-17

## 2022-12-17 NOTE — PROGRESS NOTES
I spent 45 minutes on 12/14/22 reviewing [facility] medical records prior to clinic visit on 12/15/22.

## 2022-12-19 ENCOUNTER — ROUTINE PRENATAL (OUTPATIENT)
Dept: OBSTETRICS AND GYNECOLOGY | Facility: CLINIC | Age: 22
End: 2022-12-19
Payer: MEDICAID

## 2022-12-19 VITALS — DIASTOLIC BLOOD PRESSURE: 68 MMHG | BODY MASS INDEX: 43.85 KG/M2 | WEIGHT: 293 LBS | SYSTOLIC BLOOD PRESSURE: 114 MMHG

## 2022-12-19 DIAGNOSIS — Z3A.31 31 WEEKS GESTATION OF PREGNANCY: Primary | ICD-10-CM

## 2022-12-19 LAB
BILIRUBIN, UA POC OHS: NEGATIVE
BLOOD, UA POC OHS: NEGATIVE
CLARITY, UA POC OHS: CLEAR
COLOR, UA POC OHS: YELLOW
GLUCOSE, UA POC OHS: NEGATIVE
KETONES, UA POC OHS: NEGATIVE
LEUKOCYTES, UA POC OHS: ABNORMAL
NITRITE, UA POC OHS: NEGATIVE
PH, UA POC OHS: 6
PROTEIN, UA POC OHS: NEGATIVE
SPECIFIC GRAVITY, UA POC OHS: 1.02
UROBILINOGEN, UA POC OHS: 0.2

## 2022-12-19 PROCEDURE — 99999 PR PBB SHADOW E&M-EST. PATIENT-LVL III: CPT | Mod: PBBFAC,,, | Performed by: SPECIALIST

## 2022-12-19 PROCEDURE — 99213 OFFICE O/P EST LOW 20 MIN: CPT | Mod: PBBFAC,PN | Performed by: SPECIALIST

## 2022-12-19 PROCEDURE — 81003 URINALYSIS AUTO W/O SCOPE: CPT | Mod: PBBFAC,PN | Performed by: SPECIALIST

## 2022-12-19 PROCEDURE — 99213 PR OFFICE/OUTPT VISIT, EST, LEVL III, 20-29 MIN: ICD-10-PCS | Mod: TH,S$PBB,, | Performed by: SPECIALIST

## 2022-12-19 PROCEDURE — 99213 OFFICE O/P EST LOW 20 MIN: CPT | Mod: TH,S$PBB,, | Performed by: SPECIALIST

## 2022-12-19 PROCEDURE — 99999 PR PBB SHADOW E&M-EST. PATIENT-LVL III: ICD-10-PCS | Mod: PBBFAC,,, | Performed by: SPECIALIST

## 2022-12-19 NOTE — PROGRESS NOTES
Complaints today: none  Discussed recent GI eval  No overt exacerbations. Discussed fetal measurements and DALIA and MFM referrel made and rec repeat u/s this week to follow  Pt states excellent fetal movement daily , Good fetal movements reported No Bleeding or pains    /68   Wt 134.7 kg (296 lb 15.4 oz)   LMP 05/15/2022   BMI 43.85 kg/m²     22 y.o., at 31w1d by Estimated Date of Delivery: 23  Patient Active Problem List   Diagnosis    Amniotic fluid leaking    S/P     Class 2 obesity in adult    Intravascular volume depletion    Family dynamics problem    Inflammatory bowel disease    Chronic anemia    ACP (advance care planning)    Crohn's disease ileum/colon (h/o gluteal cleft abscess 2019, transverse, ascending, cecum with extensive pseudoplyps, ascending colon stricture, ileum and fistula from TI to cecum)      Helicobacter pylori gastritis    Recurrent pancreatitis    Pancreas cyst    NAFLD (nonalcoholic fatty liver disease)    Immunosuppressed status     OB History    Para Term  AB Living   2 1 1 0 0 0   SAB IAB Ectopic Multiple Live Births   0 0 0 0 0      # Outcome Date GA Lbr Enrique/2nd Weight Sex Delivery Anes PTL Lv   2 Current            1 Term 19 37w1d  2.863 kg (6 lb 5 oz) F CS-LTranv EPI Y       Complications: Dysfunctional Labor       Dating reviewed    Allergies and problem list reviewed and updated    Medical and surgical history reviewed    Prenatal labs reviewed and updated    Physical Exam:  ABD: soft, gravid, nontender,     Assessment:  IIUP 31 weeks  Crohns history, stable  LGA    Plan:   Repeat u/s this week  MFM referrel  follow up 2 Weeks, bleeding/pain precautions   kick counts, labor precautions

## 2022-12-22 ENCOUNTER — SPECIALTY PHARMACY (OUTPATIENT)
Dept: PHARMACY | Facility: CLINIC | Age: 22
End: 2022-12-22
Payer: MEDICAID

## 2022-12-22 ENCOUNTER — PATIENT MESSAGE (OUTPATIENT)
Dept: PHARMACY | Facility: CLINIC | Age: 22
End: 2022-12-22
Payer: MEDICAID

## 2022-12-22 DIAGNOSIS — K50.819 CROHN'S DISEASE OF SMALL AND LARGE INTESTINES WITH COMPLICATION: Primary | ICD-10-CM

## 2022-12-22 RX ORDER — USTEKINUMAB 90 MG/ML
90 INJECTION, SOLUTION SUBCUTANEOUS
Qty: 1 ML | Refills: 2 | Status: SHIPPED | OUTPATIENT
Start: 2022-12-22 | End: 2023-03-10 | Stop reason: DRUGHIGH

## 2022-12-22 NOTE — TELEPHONE ENCOUNTER
Outgoing call to patient, next injection is due 1/3/23, script went to print status,    Staff messaged MDO to request script sent electronically, will follow up with patient once rx is received.

## 2022-12-22 NOTE — TELEPHONE ENCOUNTER
New script of Stelara received,  claim will not pay through insurance until 12/25, pending refill call for 12/27/22, patient due to inject 1/3/23.    Patient also has a follow up.

## 2022-12-22 NOTE — TELEPHONE ENCOUNTER
Patient likes refill calls at 11:30 am when patient is at lunch for work, will follow up with patient at that time.

## 2022-12-27 ENCOUNTER — SPECIALTY PHARMACY (OUTPATIENT)
Dept: PHARMACY | Facility: CLINIC | Age: 22
End: 2022-12-27
Payer: MEDICAID

## 2022-12-27 NOTE — TELEPHONE ENCOUNTER
"Specialty Pharmacy - Clinical Reassessment    Specialty Medication Orders Linked to Encounter      Flowsheet Row Most Recent Value   Medication #1 ustekinumab (STELARA) 90 mg/mL Syrg syringe (Order#[id number], Rx#[id number])          Patient Diagnosis   K50.813 - Crohn's disease of both small and large intestine with fistula    Mindi Tate is a 22 y.o. female, who is followed by the specialty pharmacy service for management and education of her Stelara.  She has been on therapy with Stelara for 10 months.  I have reviewed her electronic medical record and current medication list and determined that specialty medication adjustment Is not needed at this time.    Patient has not experienced adverse events.  She Is adherent reporting 0 missed doses since last review.  Adherence has been encouraged with the following mechanism(s): proactive refill calls.  She is meeting goals of therapy and will continue treatment.        2022   Follow Up Review   # of missed doses 0 0 0 0   New Medications? No Yes No No   New Conditions? No No No No   New Allergies? No No No No   Med Effective? Good Good Good Good   Urgent Care? No No No No   Requested Pharmacist? No No No No       Multiple values from one day are sorted in reverse-chronological order         Therapy is appropriate to continue.    Therapy is effective: Yes  On scale of 1 to 10, how does patient rank quality of life? (10 - Best): 10 (no symptoms of Crohn's, and "it's a 10 compared to before I started Stelara")   Recommendations: none at this time.  Review Method: Patient Contact    Of note, patient is currently pregnant and saw Dr. Rodríguez in clinic earlier this month. Plan is to continue Stelara through pregnancy. GI clinic will help coordinate timing of dose around her scheduled . Patient informed me that prior to starting Stelara she frequently could not leave the bathroom. She has no other questions or concerns " today.    Tasks added this encounter   No tasks added.   Tasks due within next 3 months   12/22/2022 - Clinical - Follow Up Assesement (Annual)  2/18/2023 - Refill Call (Auto Added)     Gin Foote, PharmD  [facility]  [address]  [facility] [address]  Phone: [phone]  Fax: [phone]

## 2022-12-27 NOTE — TELEPHONE ENCOUNTER
Patient has an appointment at 1pm, ok with a follow up call from [facility] before then.    Specialty Pharmacy - Refill Coordination    Specialty Medication Orders Linked to Encounter      Flowsheet Row Most Recent Value   Medication #1 ustekinumab (STELARA) 90 mg/mL Syrg syringe (Order#[id number], Rx#)            Refill Questions - Documented Responses      Flowsheet Row Most Recent Value   Patient Availability and HIPAA Verification    Does patient want to proceed with activity? Yes   HIPAA/medical authority confirmed? Yes   Relationship to patient of person spoken to? Self   Refill Screening Questions    Changes to allergies? No   Changes to medications? No   New conditions since last clinic visit? No   Unplanned office visit, urgent care, ED, or hospital admission in the last 4 weeks? No   How does patient/caregiver feel medication is working? Good   Financial problems or insurance changes? No   How many doses of your specialty medications were missed in the last 4 weeks? 0   Would patient like to speak to a pharmacist? No   When does the patient need to receive the medication? 01/03/23   Refill Delivery Questions    How will the patient receive the medication? MEDRx   When does the patient need to receive the medication? 01/03/23   Shipping Address Home   Address in [facility] confirmed and updated if neccessary? Yes   Expected Copay ($) 0   Is the patient able to afford the medication copay? Yes   Payment Method zero copay   Days supply of Refill 56   Supplies needed? No supplies needed   Refill activity completed? Yes   Refill activity plan Refill scheduled   Shipment/Pickup Date: 01/03/23            Current Outpatient Medications   Medication Sig    doxylamine-pyridoxine, vit B6, (DICLEGIS) 10-10 mg TbEC Take 2 tablets by mouth every evening.    ondansetron (ZOFRAN-ODT) 4 MG TbDL Take 1 tablet (4 mg total) by mouth every 6 (six) hours as needed.     27 mg iron- 1 mg Tab Take 1 tablet by mouth.     ustekinumab (STELARA) 90 mg/mL Syrg syringe Inject 1 mL (90 mg total) into the skin every 8 weeks.   Last reviewed on 12/19/2022  9:12 AM by Deidra Carrasquillo MA    Review of patient's allergies indicates:  No Known Allergies Last reviewed on  12/22/2022 11:51 AM by Lisset Arana      Tasks added this encounter   2/18/2023 - Refill Call (Auto Added)   Tasks due within next 3 months   12/22/2022 - Clinical - Follow Up Assesement (Annual)     Daysi Shaffer, PharmD  [facility]  [address]  [facility] [address]  Phone: [phone]  Fax: [phone]

## 2023-01-05 ENCOUNTER — ROUTINE PRENATAL (OUTPATIENT)
Dept: OBSTETRICS AND GYNECOLOGY | Facility: CLINIC | Age: 23
End: 2023-01-05
Payer: MEDICAID

## 2023-01-05 ENCOUNTER — HOSPITAL ENCOUNTER (OUTPATIENT)
Dept: RADIOLOGY | Facility: HOSPITAL | Age: 23
Discharge: HOME | End: 2023-01-05
Attending: SPECIALIST
Payer: MEDICAID

## 2023-01-05 VITALS — DIASTOLIC BLOOD PRESSURE: 62 MMHG | BODY MASS INDEX: 43.45 KG/M2 | SYSTOLIC BLOOD PRESSURE: 116 MMHG | WEIGHT: 293 LBS

## 2023-01-05 DIAGNOSIS — Z3A.33 33 WEEKS GESTATION OF PREGNANCY: Primary | ICD-10-CM

## 2023-01-05 DIAGNOSIS — Z36.89 ENCOUNTER FOR ULTRASOUND TO CHECK FETAL GROWTH: ICD-10-CM

## 2023-01-05 DIAGNOSIS — Z3A.31 31 WEEKS GESTATION OF PREGNANCY: ICD-10-CM

## 2023-01-05 LAB
BILIRUBIN, UA POC OHS: ABNORMAL
BLOOD, UA POC OHS: NEGATIVE
CLARITY, UA POC OHS: CLEAR
COLOR, UA POC OHS: YELLOW
GLUCOSE, UA POC OHS: NEGATIVE
KETONES, UA POC OHS: NEGATIVE
LEUKOCYTES, UA POC OHS: NEGATIVE
NITRITE, UA POC OHS: NEGATIVE
PH, UA POC OHS: 6
PROTEIN, UA POC OHS: ABNORMAL
SPECIFIC GRAVITY, UA POC OHS: >=1.03
UROBILINOGEN, UA POC OHS: 2

## 2023-01-05 PROCEDURE — 81003 URINALYSIS AUTO W/O SCOPE: CPT | Mod: PBBFAC,PN | Performed by: SPECIALIST

## 2023-01-05 PROCEDURE — 76815 US OB LIMITED 1 OR MORE GESTATIONS: ICD-10-PCS | Mod: 26,,, | Performed by: RADIOLOGY

## 2023-01-05 PROCEDURE — 76815 OB US LIMITED FETUS(S): CPT | Mod: TC,PN

## 2023-01-05 PROCEDURE — 99999 PR PBB SHADOW E&M-EST. PATIENT-LVL III: CPT | Mod: PBBFAC,,, | Performed by: SPECIALIST

## 2023-01-05 PROCEDURE — 99213 OFFICE O/P EST LOW 20 MIN: CPT | Mod: TH,S$PBB,, | Performed by: SPECIALIST

## 2023-01-05 PROCEDURE — 99999 PR PBB SHADOW E&M-EST. PATIENT-LVL III: ICD-10-PCS | Mod: PBBFAC,,, | Performed by: SPECIALIST

## 2023-01-05 PROCEDURE — 99213 PR OFFICE/OUTPT VISIT, EST, LEVL III, 20-29 MIN: ICD-10-PCS | Mod: TH,S$PBB,, | Performed by: SPECIALIST

## 2023-01-05 PROCEDURE — 99213 OFFICE O/P EST LOW 20 MIN: CPT | Mod: PBBFAC,TH,PN | Performed by: SPECIALIST

## 2023-01-05 PROCEDURE — 76815 OB US LIMITED FETUS(S): CPT | Mod: 26,,, | Performed by: RADIOLOGY

## 2023-01-05 NOTE — PROGRESS NOTES
Complaints today: none  Received Rhogam  Reviewed and discussed MFM assessment  , Good fetal movements reported No Bleeding or pains    /62   Wt 135.4 kg (298 lb 8.1 oz)   LMP 05/15/2022   BMI 43.45 kg/m²     22 y.o., at 33w4d by Estimated Date of Delivery: 23  Patient Active Problem List   Diagnosis    Amniotic fluid leaking    S/P     Class 2 obesity in adult    Intravascular volume depletion    Family dynamics problem    Inflammatory bowel disease    Chronic anemia    ACP (advance care planning)    Crohn's disease ileum/colon (h/o gluteal cleft abscess 2019, transverse, ascending, cecum with extensive pseudoplyps, ascending colon stricture, ileum and fistula from TI to cecum)      Helicobacter pylori gastritis    Recurrent pancreatitis    Pancreas cyst    NAFLD (nonalcoholic fatty liver disease)    Immunosuppressed status     OB History    Para Term  AB Living   2 1 1 0 0 0   SAB IAB Ectopic Multiple Live Births   0 0 0 0 0      # Outcome Date GA Lbr Enrique/2nd Weight Sex Delivery Anes PTL Lv   2 Current            1 Term 19 37w1d  2.863 kg (6 lb 5 oz) F CS-LTranv EPI Y       Complications: Dysfunctional Labor       Dating reviewed    Allergies and problem list reviewed and updated    Medical and surgical history reviewed    Prenatal labs reviewed and updated    Physical Exam:  ABD: soft, gravid, nontender,     Assessment:  IUP 33 w 4d  Previous     Plan:   Review u/s  GBS on RTO  follow up 2 Weeks, bleeding/pain precautions   kick counts, labor precautions

## 2023-01-15 ENCOUNTER — PATIENT MESSAGE (OUTPATIENT)
Dept: OTHER | Facility: OTHER | Age: 23
End: 2023-01-15
Payer: MEDICAID

## 2023-01-20 ENCOUNTER — ROUTINE PRENATAL (OUTPATIENT)
Dept: OBSTETRICS AND GYNECOLOGY | Facility: CLINIC | Age: 23
End: 2023-01-20
Payer: MEDICAID

## 2023-01-20 VITALS — BODY MASS INDEX: 44.86 KG/M2 | WEIGHT: 293 LBS | SYSTOLIC BLOOD PRESSURE: 122 MMHG | DIASTOLIC BLOOD PRESSURE: 68 MMHG

## 2023-01-20 DIAGNOSIS — Z3A.35 35 WEEKS GESTATION OF PREGNANCY: Primary | ICD-10-CM

## 2023-01-20 LAB
BILIRUBIN, UA POC OHS: NEGATIVE
BLOOD, UA POC OHS: NEGATIVE
CLARITY, UA POC OHS: CLEAR
COLOR, UA POC OHS: YELLOW
GLUCOSE, UA POC OHS: NEGATIVE
KETONES, UA POC OHS: NEGATIVE
LEUKOCYTES, UA POC OHS: NEGATIVE
NITRITE, UA POC OHS: NEGATIVE
PH, UA POC OHS: 6
PROTEIN, UA POC OHS: ABNORMAL
SPECIFIC GRAVITY, UA POC OHS: >=1.03
UROBILINOGEN, UA POC OHS: 0.2

## 2023-01-20 PROCEDURE — 99999 PR PBB SHADOW E&M-EST. PATIENT-LVL II: ICD-10-PCS | Mod: PBBFAC,,, | Performed by: SPECIALIST

## 2023-01-20 PROCEDURE — 1111F PR DISCHARGE MEDS RECONCILED W/ CURRENT OUTPATIENT MED LIST: ICD-10-PCS | Mod: CPTII,,, | Performed by: SPECIALIST

## 2023-01-20 PROCEDURE — 1111F DSCHRG MED/CURRENT MED MERGE: CPT | Mod: CPTII,,, | Performed by: SPECIALIST

## 2023-01-20 PROCEDURE — 99999 PR PBB SHADOW E&M-EST. PATIENT-LVL II: CPT | Mod: PBBFAC,,, | Performed by: SPECIALIST

## 2023-01-20 PROCEDURE — 99213 PR OFFICE/OUTPT VISIT, EST, LEVL III, 20-29 MIN: ICD-10-PCS | Mod: TH,25,S$PBB, | Performed by: SPECIALIST

## 2023-01-20 PROCEDURE — 59025 PR FETAL 2N-STRESS TEST: ICD-10-PCS | Mod: 26,S$PBB,, | Performed by: SPECIALIST

## 2023-01-20 PROCEDURE — 59025 FETAL NON-STRESS TEST: CPT | Mod: PBBFAC,PN | Performed by: SPECIALIST

## 2023-01-20 PROCEDURE — 81003 URINALYSIS AUTO W/O SCOPE: CPT | Mod: PBBFAC,PN | Performed by: SPECIALIST

## 2023-01-20 PROCEDURE — 87081 CULTURE SCREEN ONLY: CPT | Performed by: SPECIALIST

## 2023-01-20 PROCEDURE — 59025 FETAL NON-STRESS TEST: CPT | Mod: 26,S$PBB,, | Performed by: SPECIALIST

## 2023-01-20 PROCEDURE — 99212 OFFICE O/P EST SF 10 MIN: CPT | Mod: PBBFAC,PN | Performed by: SPECIALIST

## 2023-01-20 PROCEDURE — 99213 OFFICE O/P EST LOW 20 MIN: CPT | Mod: TH,25,S$PBB, | Performed by: SPECIALIST

## 2023-01-20 RX ORDER — CEPHALEXIN 250 MG/1
250 CAPSULE ORAL 4 TIMES DAILY
Qty: 40 CAPSULE | Refills: 0 | Status: SHIPPED | OUTPATIENT
Start: 2023-01-20 | End: 2023-01-30

## 2023-01-20 NOTE — PROGRESS NOTES
FETAL SURVEILLANCE TESTING SUMMARY  INDICATIONS:  Initial fetal tachycardia.        Fetal doppler heart rate tracing obtained in the usual fashion with the patient in the left supine position.     OBJECTIVE RESULTS:  Baseline fetal heart rate: 144     Fetal heart variability: present  Fetal Heart Rate decelerations:no  Fetal acoustic stimulator: no  Fetal Heart Rate accelerations: present  Fetal Non-stress Test: reactive  Uterine irritability:no     Fetal surveillance: reasurring    Note  Transient long fetal acel with return to fetal baseline 144    Complaints today: submentum abscess, Good fetal movements reported No Bleeding or pains    /68   Wt (!) 137.8 kg (303 lb 12.7 oz)   LMP 05/15/2022   BMI 44.86 kg/m²     22 y.o., at 35w5d by Estimated Date of Delivery: 23  Patient Active Problem List   Diagnosis    Amniotic fluid leaking    S/P     Class 2 obesity in adult    Intravascular volume depletion    Family dynamics problem    Inflammatory bowel disease    Chronic anemia    ACP (advance care planning)    Crohn's disease ileum/colon (h/o gluteal cleft abscess 2019, transverse, ascending, cecum with extensive pseudoplyps, ascending colon stricture, ileum and fistula from TI to cecum)      Helicobacter pylori gastritis    Recurrent pancreatitis    Pancreas cyst    NAFLD (nonalcoholic fatty liver disease)    Immunosuppressed status     OB History    Para Term  AB Living   2 1 1 0 0 0   SAB IAB Ectopic Multiple Live Births   0 0 0 0 0      # Outcome Date GA Lbr Enrique/2nd Weight Sex Delivery Anes PTL Lv   2 Current            1 Term 19 37w1d  2.863 kg (6 lb 5 oz) F CS-LTranv EPI Y       Complications: Dysfunctional Labor       Dating reviewed    Allergies and problem list reviewed and updated    Medical and surgical history reviewed    Prenatal labs reviewed and updated    Physical Exam:  ABD: soft, gravid, nontender,   Noted is a 1cm submentum abscess No  drainage or crepitance noted No adenopathy  Cervical exam LTC    Assessment:  Reasurring fetal mon  Previous    36 weeks    Plan:   GBS submitted  Will place on Keflex  I see no access to I and D at this time  Warm compresses and pt instructed to present to ER for f/c, or enlarging   follow up 1Weeks, bleeding/pain precautions  kick counts, labor precautions   NOTE PT DESIRES BTL AT TIME OF  AND CONSENTS OBTAINED TODAY

## 2023-01-23 LAB — BACTERIA SPEC AEROBE CULT: NORMAL

## 2023-01-26 ENCOUNTER — ROUTINE PRENATAL (OUTPATIENT)
Dept: OBSTETRICS AND GYNECOLOGY | Facility: CLINIC | Age: 23
End: 2023-01-26
Payer: MEDICAID

## 2023-01-26 VITALS — SYSTOLIC BLOOD PRESSURE: 124 MMHG | BODY MASS INDEX: 45.35 KG/M2 | DIASTOLIC BLOOD PRESSURE: 76 MMHG | WEIGHT: 293 LBS

## 2023-01-26 DIAGNOSIS — Z3A.36 36 WEEKS GESTATION OF PREGNANCY: Primary | ICD-10-CM

## 2023-01-26 LAB
BILIRUBIN, UA POC OHS: NEGATIVE
BLOOD, UA POC OHS: NEGATIVE
CLARITY, UA POC OHS: CLEAR
COLOR, UA POC OHS: YELLOW
GLUCOSE, UA POC OHS: NEGATIVE
KETONES, UA POC OHS: NEGATIVE
LEUKOCYTES, UA POC OHS: NEGATIVE
NITRITE, UA POC OHS: NEGATIVE
PH, UA POC OHS: 6.5
PROTEIN, UA POC OHS: NEGATIVE
SPECIFIC GRAVITY, UA POC OHS: 1.02
UROBILINOGEN, UA POC OHS: 1

## 2023-01-26 PROCEDURE — 1111F DSCHRG MED/CURRENT MED MERGE: CPT | Mod: CPTII,,, | Performed by: SPECIALIST

## 2023-01-26 PROCEDURE — 1111F PR DISCHARGE MEDS RECONCILED W/ CURRENT OUTPATIENT MED LIST: ICD-10-PCS | Mod: CPTII,,, | Performed by: SPECIALIST

## 2023-01-26 PROCEDURE — 99213 OFFICE O/P EST LOW 20 MIN: CPT | Mod: TH,S$PBB,, | Performed by: SPECIALIST

## 2023-01-26 PROCEDURE — 81003 URINALYSIS AUTO W/O SCOPE: CPT | Mod: PBBFAC,PN | Performed by: SPECIALIST

## 2023-01-26 PROCEDURE — 99212 OFFICE O/P EST SF 10 MIN: CPT | Mod: PBBFAC,PN | Performed by: SPECIALIST

## 2023-01-26 PROCEDURE — 99999 PR PBB SHADOW E&M-EST. PATIENT-LVL II: CPT | Mod: PBBFAC,,, | Performed by: SPECIALIST

## 2023-01-26 PROCEDURE — 99213 PR OFFICE/OUTPT VISIT, EST, LEVL III, 20-29 MIN: ICD-10-PCS | Mod: TH,S$PBB,, | Performed by: SPECIALIST

## 2023-01-26 PROCEDURE — 99999 PR PBB SHADOW E&M-EST. PATIENT-LVL II: ICD-10-PCS | Mod: PBBFAC,,, | Performed by: SPECIALIST

## 2023-01-26 NOTE — PROGRESS NOTES
Complaints today:none , Good fetal movements reported No Bleeding or pains    /76   Wt (!) 139.3 kg (307 lb 1.6 oz)   LMP 05/15/2022   BMI 45.35 kg/m²     22 y.o., at 36w4d by Estimated Date of Delivery: 23  Patient Active Problem List   Diagnosis    Amniotic fluid leaking    S/P     Class 2 obesity in adult    Intravascular volume depletion    Family dynamics problem    Inflammatory bowel disease    Chronic anemia    ACP (advance care planning)    Crohn's disease ileum/colon (h/o gluteal cleft abscess 2019, transverse, ascending, cecum with extensive pseudoplyps, ascending colon stricture, ileum and fistula from TI to cecum)      Helicobacter pylori gastritis    Recurrent pancreatitis    Pancreas cyst    NAFLD (nonalcoholic fatty liver disease)    Immunosuppressed status     OB History    Para Term  AB Living   2 1 1 0 0 0   SAB IAB Ectopic Multiple Live Births   0 0 0 0 0      # Outcome Date GA Lbr Enrique/2nd Weight Sex Delivery Anes PTL Lv   2 Current            1 Term 19 37w1d  2.863 kg (6 lb 5 oz) F CS-LTranv EPI Y       Complications: Dysfunctional Labor       Dating reviewed    Allergies and problem list reviewed and updated    Medical and surgical history reviewed    Prenatal labs reviewed and updated    Physical Exam:  ABD: soft, gravid, nontender,     Assessment:  IUP 36 4/7  Previous     Plan:     follow up 1 Weeks, bleeding/pain precautions   kick counts, labor precautions

## 2023-01-27 ENCOUNTER — TELEPHONE (OUTPATIENT)
Dept: OBSTETRICS AND GYNECOLOGY | Facility: CLINIC | Age: 23
End: 2023-01-27
Payer: MEDICAID

## 2023-01-27 NOTE — TELEPHONE ENCOUNTER
Phone call from patient states she feels she is going into labor, nausea, diarrhea, contractions. Good fetal movement, advised to go to [facility] l&d for eval

## 2023-02-02 ENCOUNTER — CLINICAL SUPPORT (OUTPATIENT)
Dept: ENDOSCOPY | Facility: HOSPITAL | Age: 23
End: 2023-02-02
Attending: INTERNAL MEDICINE
Payer: MEDICAID

## 2023-02-02 ENCOUNTER — ROUTINE PRENATAL (OUTPATIENT)
Dept: OBSTETRICS AND GYNECOLOGY | Facility: CLINIC | Age: 23
End: 2023-02-02
Payer: MEDICAID

## 2023-02-02 VITALS
BODY MASS INDEX: 43.25 KG/M2 | SYSTOLIC BLOOD PRESSURE: 118 MMHG | BODY MASS INDEX: 45.06 KG/M2 | HEIGHT: 69 IN | WEIGHT: 293 LBS | WEIGHT: 292 LBS | DIASTOLIC BLOOD PRESSURE: 62 MMHG

## 2023-02-02 DIAGNOSIS — Z3A.37 37 WEEKS GESTATION OF PREGNANCY: Primary | ICD-10-CM

## 2023-02-02 DIAGNOSIS — Z12.11 SPECIAL SCREENING FOR MALIGNANT NEOPLASMS, COLON: Primary | ICD-10-CM

## 2023-02-02 DIAGNOSIS — K50.819 CROHN'S DISEASE OF SMALL AND LARGE INTESTINES WITH COMPLICATION: ICD-10-CM

## 2023-02-02 LAB
BILIRUBIN, UA POC OHS: ABNORMAL
BLOOD, UA POC OHS: NEGATIVE
CLARITY, UA POC OHS: CLEAR
COLOR, UA POC OHS: YELLOW
GLUCOSE, UA POC OHS: NEGATIVE
KETONES, UA POC OHS: ABNORMAL
LEUKOCYTES, UA POC OHS: NEGATIVE
NITRITE, UA POC OHS: NEGATIVE
PH, UA POC OHS: 5.5
PROTEIN, UA POC OHS: 100
SPECIFIC GRAVITY, UA POC OHS: >=1.03
UROBILINOGEN, UA POC OHS: 2

## 2023-02-02 PROCEDURE — 1111F DSCHRG MED/CURRENT MED MERGE: CPT | Mod: CPTII,,, | Performed by: SPECIALIST

## 2023-02-02 PROCEDURE — 81003 URINALYSIS AUTO W/O SCOPE: CPT | Mod: PBBFAC,PN | Performed by: SPECIALIST

## 2023-02-02 PROCEDURE — 1111F PR DISCHARGE MEDS RECONCILED W/ CURRENT OUTPATIENT MED LIST: ICD-10-PCS | Mod: CPTII,,, | Performed by: SPECIALIST

## 2023-02-02 PROCEDURE — 99212 OFFICE O/P EST SF 10 MIN: CPT | Mod: PBBFAC,PN | Performed by: SPECIALIST

## 2023-02-02 PROCEDURE — 99999 PR PBB SHADOW E&M-EST. PATIENT-LVL II: CPT | Mod: PBBFAC,,, | Performed by: SPECIALIST

## 2023-02-02 PROCEDURE — 99999 PR PBB SHADOW E&M-EST. PATIENT-LVL II: ICD-10-PCS | Mod: PBBFAC,,, | Performed by: SPECIALIST

## 2023-02-02 PROCEDURE — 99213 OFFICE O/P EST LOW 20 MIN: CPT | Mod: TH,S$PBB,, | Performed by: SPECIALIST

## 2023-02-02 PROCEDURE — 99213 PR OFFICE/OUTPT VISIT, EST, LEVL III, 20-29 MIN: ICD-10-PCS | Mod: TH,S$PBB,, | Performed by: SPECIALIST

## 2023-02-02 RX ORDER — POLYETHYLENE GLYCOL 3350, SODIUM SULFATE ANHYDROUS, SODIUM BICARBONATE, SODIUM CHLORIDE, POTASSIUM CHLORIDE 236; 22.74; 6.74; 5.86; 2.97 G/4L; G/4L; G/4L; G/4L; G/4L
4 POWDER, FOR SOLUTION ORAL ONCE
Qty: 4000 ML | Refills: 0 | Status: SHIPPED | OUTPATIENT
Start: 2023-02-02 | End: 2023-02-02

## 2023-02-02 NOTE — PROGRESS NOTES
Complaints today: none , Good fetal movements reported No Bleeding or pains    /62   Wt (!) 138.4 kg (305 lb 1.9 oz)   LMP 05/15/2022   BMI 45.06 kg/m²     22 y.o., at 37w4d by Estimated Date of Delivery: 23  Patient Active Problem List   Diagnosis    Amniotic fluid leaking    S/P     Class 2 obesity in adult    Intravascular volume depletion    Family dynamics problem    Inflammatory bowel disease    Chronic anemia    ACP (advance care planning)    Crohn's disease ileum/colon (h/o gluteal cleft abscess 2019, transverse, ascending, cecum with extensive pseudoplyps, ascending colon stricture, ileum and fistula from TI to cecum)      Helicobacter pylori gastritis    Recurrent pancreatitis    Pancreas cyst    NAFLD (nonalcoholic fatty liver disease)    Immunosuppressed status     OB History    Para Term  AB Living   2 1 1 0 0 0   SAB IAB Ectopic Multiple Live Births   0 0 0 0 0      # Outcome Date GA Lbr Enrique/2nd Weight Sex Delivery Anes PTL Lv   2 Current            1 Term 19 37w1d  2.863 kg (6 lb 5 oz) F CS-LTranv EPI Y       Complications: Dysfunctional Labor       Dating reviewed    Allergies and problem list reviewed and updated    Medical and surgical history reviewed    Prenatal labs reviewed and updated    Physical Exam:  ABD: soft, gravid, nontender,     Assessment:  IUP 37 weeks  Previous     Plan:     follow up 1 Weeks, bleeding/pain precautions  kick counts, labor precautions

## 2023-02-02 NOTE — PLAN OF CARE
Endoscopy procedure scheduled on 4/24/23, prep instructions reviewed, Pt verbalized understanding.

## 2023-02-09 ENCOUNTER — ROUTINE PRENATAL (OUTPATIENT)
Dept: OBSTETRICS AND GYNECOLOGY | Facility: CLINIC | Age: 23
End: 2023-02-09
Payer: MEDICAID

## 2023-02-09 VITALS — WEIGHT: 293 LBS | SYSTOLIC BLOOD PRESSURE: 118 MMHG | DIASTOLIC BLOOD PRESSURE: 82 MMHG | BODY MASS INDEX: 45.35 KG/M2

## 2023-02-09 DIAGNOSIS — Z3A.38 38 WEEKS GESTATION OF PREGNANCY: Primary | ICD-10-CM

## 2023-02-09 LAB
BILIRUBIN, UA POC OHS: NEGATIVE
BLOOD, UA POC OHS: NEGATIVE
CLARITY, UA POC OHS: CLEAR
COLOR, UA POC OHS: YELLOW
GLUCOSE, UA POC OHS: NEGATIVE
KETONES, UA POC OHS: ABNORMAL
LEUKOCYTES, UA POC OHS: NEGATIVE
NITRITE, UA POC OHS: NEGATIVE
PH, UA POC OHS: 6.5
PROTEIN, UA POC OHS: ABNORMAL
SPECIFIC GRAVITY, UA POC OHS: 1.02
UROBILINOGEN, UA POC OHS: 1

## 2023-02-09 PROCEDURE — 1111F PR DISCHARGE MEDS RECONCILED W/ CURRENT OUTPATIENT MED LIST: ICD-10-PCS | Mod: CPTII,,, | Performed by: SPECIALIST

## 2023-02-09 PROCEDURE — 99999 PR PBB SHADOW E&M-EST. PATIENT-LVL II: CPT | Mod: PBBFAC,,, | Performed by: SPECIALIST

## 2023-02-09 PROCEDURE — 99213 PR OFFICE/OUTPT VISIT, EST, LEVL III, 20-29 MIN: ICD-10-PCS | Mod: TH,S$PBB,, | Performed by: SPECIALIST

## 2023-02-09 PROCEDURE — 99999 PR PBB SHADOW E&M-EST. PATIENT-LVL II: ICD-10-PCS | Mod: PBBFAC,,, | Performed by: SPECIALIST

## 2023-02-09 PROCEDURE — 99213 OFFICE O/P EST LOW 20 MIN: CPT | Mod: TH,S$PBB,, | Performed by: SPECIALIST

## 2023-02-09 PROCEDURE — 1111F DSCHRG MED/CURRENT MED MERGE: CPT | Mod: CPTII,,, | Performed by: SPECIALIST

## 2023-02-09 PROCEDURE — 81003 URINALYSIS AUTO W/O SCOPE: CPT | Mod: PBBFAC,PN | Performed by: SPECIALIST

## 2023-02-09 PROCEDURE — 99212 OFFICE O/P EST SF 10 MIN: CPT | Mod: PBBFAC,PN | Performed by: SPECIALIST

## 2023-02-09 NOTE — PROGRESS NOTES
Complaints today: none , Good fetal movements reported No Bleeding or pains  Scheduled for repeat   7am    /82   Wt (!) 139.3 kg (307 lb 1.6 oz)   LMP 05/15/2022   BMI 45.35 kg/m²     22 y.o., at 38w4d by Estimated Date of Delivery: 23  Patient Active Problem List   Diagnosis    Amniotic fluid leaking    S/P     Class 2 obesity in adult    Intravascular volume depletion    Family dynamics problem    Inflammatory bowel disease    Chronic anemia    ACP (advance care planning)    Crohn's disease ileum/colon (h/o gluteal cleft abscess 2019, transverse, ascending, cecum with extensive pseudoplyps, ascending colon stricture, ileum and fistula from TI to cecum)      Helicobacter pylori gastritis    Recurrent pancreatitis    Pancreas cyst    NAFLD (nonalcoholic fatty liver disease)    Immunosuppressed status     OB History    Para Term  AB Living   2 1 1 0 0 0   SAB IAB Ectopic Multiple Live Births   0 0 0 0 0      # Outcome Date GA Lbr Enrique/2nd Weight Sex Delivery Anes PTL Lv   2 Current            1 Term 19 37w1d  2.863 kg (6 lb 5 oz) F CS-LTranv EPI Y       Complications: Dysfunctional Labor       Dating reviewed    Allergies and problem list reviewed and updated    Medical and surgical history reviewed    Prenatal labs reviewed and updated    Physical Exam:  ABD: soft, gravid, nontender,     Assessment:  IUP 38 weeks  Previous     Plan:   Preop instructions given  , bleeding/pain precautions   kick counts, labor precautions

## 2023-02-22 ENCOUNTER — SPECIALTY PHARMACY (OUTPATIENT)
Dept: PHARMACY | Facility: CLINIC | Age: 23
End: 2023-02-22
Payer: MEDICAID

## 2023-02-22 ENCOUNTER — TELEPHONE (OUTPATIENT)
Dept: OBSTETRICS AND GYNECOLOGY | Facility: CLINIC | Age: 23
End: 2023-02-22
Payer: MEDICAID

## 2023-02-22 NOTE — TELEPHONE ENCOUNTER
Patient due to inject on 2/28/23, informed patient [facility] will reach back out once authorization is approved again.

## 2023-02-22 NOTE — TELEPHONE ENCOUNTER
----- Message from Antonella Reed sent at 2/22/2023  9:44 AM CST -----  Contact: patient  Type:  Apoointment Request    Name of Caller: Patient     When is the first available appointment?     Symptoms: f/u- c- section     Would the patient rather a call back or a response via [facility]? Call     Best Call Back Number:[phone] (home)      Additional Information: Patient stated that she thinks that her c- section scar is opened.

## 2023-02-23 ENCOUNTER — POSTPARTUM VISIT (OUTPATIENT)
Dept: OBSTETRICS AND GYNECOLOGY | Facility: CLINIC | Age: 23
End: 2023-02-23
Payer: MEDICAID

## 2023-02-23 VITALS
WEIGHT: 293 LBS | SYSTOLIC BLOOD PRESSURE: 141 MMHG | BODY MASS INDEX: 43.4 KG/M2 | HEART RATE: 75 BPM | HEIGHT: 69 IN | DIASTOLIC BLOOD PRESSURE: 71 MMHG

## 2023-02-23 PROCEDURE — 99999 PR PBB SHADOW E&M-EST. PATIENT-LVL III: CPT | Mod: PBBFAC,,, | Performed by: SPECIALIST

## 2023-02-23 PROCEDURE — 59430 PR CARE AFTER DELIVERY ONLY: ICD-10-PCS | Mod: ,,, | Performed by: SPECIALIST

## 2023-02-23 PROCEDURE — 99213 OFFICE O/P EST LOW 20 MIN: CPT | Mod: PBBFAC,PN | Performed by: SPECIALIST

## 2023-02-23 PROCEDURE — 99999 PR PBB SHADOW E&M-EST. PATIENT-LVL III: ICD-10-PCS | Mod: PBBFAC,,, | Performed by: SPECIALIST

## 2023-02-23 NOTE — TELEPHONE ENCOUNTER
Specialty Pharmacy - Refill Coordination    Specialty Medication Orders Linked to Encounter      Flowsheet Row Most Recent Value   Medication #1 ustekinumab (STELARA) 90 mg/mL Syrg syringe (Order#[id number], Rx#[id number])            Refill Questions - Documented Responses      Flowsheet Row Most Recent Value   Patient Availability and HIPAA Verification    Does patient want to proceed with activity? Yes   HIPAA/medical authority confirmed? Yes   Relationship to patient of person spoken to? Self   Refill Screening Questions    Changes to allergies? No   Changes to medications? No   New conditions since last clinic visit? No   Unplanned office visit, urgent care, ED, or hospital admission in the last 4 weeks? Yes  [scheduled ]   How does patient/caregiver feel medication is working? Very good   Financial problems or insurance changes? No   How many doses of your specialty medications were missed in the last 4 weeks? 0   Would patient like to speak to a pharmacist? No   When does the patient need to receive the medication? 23   Refill Delivery Questions    How will the patient receive the medication? MEDRx   When does the patient need to receive the medication? 23   Shipping Address Home   Address in [facility] confirmed and updated if neccessary? Yes   Expected Copay ($) 0   Is the patient able to afford the medication copay? Yes   Payment Method zero copay   Days supply of Refill 56   Supplies needed? No supplies needed   Refill activity completed? Yes   Refill activity plan Refill scheduled   Shipment/Pickup Date: 23            Current Outpatient Medications   Medication Sig    ferrous sulfate 325 (65 FE) MG EC tablet Take 1 tablet (325 mg total) by mouth once daily.    ibuprofen (ADVIL,MOTRIN) 600 MG tablet Take 1 tablet (600 mg total) by mouth every 6 (six) hours as needed for Pain.    oxyCODONE-acetaminophen (PERCOCET) 5-325 mg per tablet Take 1 tablet by mouth every 4 (four)  hours as needed for Pain. (Patient not taking: Reported on 2/23/2023)    prenatal vit/iron fum/folic ac (PRENATAL 1+1 ORAL) Take by mouth.     27 mg iron- 1 mg Tab Take 1 tablet by mouth.    ustekinumab (STELARA) 90 mg/mL Syrg syringe Inject 1 mL (90 mg total) into the skin every 8 weeks.   Last reviewed on 2/23/2023 11:37 AM by Suki Hirsch MA    Review of patient's allergies indicates:  No Known Allergies Last reviewed on  2/23/2023 11:36 AM by Suki Hirsch      Tasks added this encounter   No tasks added.   Tasks due within next 3 months   4/16/2023 - Refill Call (Auto Added)     Daysi Shaffer, PharmD  [facility]  [address] [place] [address]  Phone: [phone]  Fax: [phone]

## 2023-02-23 NOTE — PROGRESS NOTES
23 yo BF s/p LT  presents for PO PP eval Mild incisional tenderness  denies pain, f/c, dysuria, hematuria, drainage at site  Past Medical History:   Diagnosis Date    ADHD (attention deficit hyperactivity disorder)     Crohn's disease ileum/colon (h/o gluteal cleft abscess 2019, transverse, ascending, cecum with extensive pseudoplyps, ascending colon stricture, ileum and fistula from TI to cecum)   2022    H/O seasonal allergies     Helicobacter pylori gastritis 2022    Immunosuppressed status 2022    NAFLD (nonalcoholic fatty liver disease)     Pancreas cyst 2022    Recurrent pancreatitis 2022       Past Surgical History:   Procedure Laterality Date    ADENOIDECTOMY       SECTION Left 2019    Procedure:  SECTION;  Surgeon: Robbie Jaramillo MD;  Location: [facility] L&D;  Service: OB/GYN;  Laterality: Left;     SECTION       SECTION WITH TUBAL LIGATION N/A 2023    Procedure:  SECTION, WITH TUBAL LIGATION;  Surgeon: Robbie Jaramillo MD;  Location: [facility] L&D;  Service: OB/GYN;  Laterality: N/A;    CHOLECYSTECTOMY      COLONOSCOPY N/A 10/15/2020    Procedure: COLONOSCOPY;  Surgeon: Olegario Lozano MD;  Location: [facility];  Service: Endoscopy;  Laterality: N/A;    COLONOSCOPY N/A 10/7/2021    Procedure: COLONOSCOPY- unable to reach cecum -poor prep;  Surgeon: Dre Hubbard MD;  Location: [facility];  Service: Endoscopy;  Laterality: N/A;    COLONOSCOPY N/A 10/12/2021    Procedure: COLONOSCOPY- no cecum -poor prep;  Surgeon: Olegario Lozano MD;  Location: [facility];  Service: Endoscopy;  Laterality: N/A;    ESOPHAGOGASTRODUODENOSCOPY N/A 2021    Procedure: EGD (ESOPHAGOGASTRODUODENOSCOPY);  Surgeon: Emanuel Jackson MD;  Location: [facility];  Service: Endoscopy;  Laterality: N/A;    LAPAROSCOPIC CHOLECYSTECTOMY N/A 2019    Procedure: CHOLECYSTECTOMY, LAPAROSCOPIC;  Surgeon: Jewel Cutler MD;  Location: [facility];   Service: General;  Laterality: N/A;    TONSILLECTOMY  2011    UPPER GASTROINTESTINAL ENDOSCOPY         Family History   Problem Relation Age of Onset    Breast cancer Paternal Aunt     Multiple sclerosis Father     Seizures Father     Asthma Sister     Crohn's disease Brother     Ovarian cancer Neg Hx        Social History     Socioeconomic History    Marital status: Single   Tobacco Use    Smoking status: Never    Smokeless tobacco: Never   Substance and Sexual Activity    Alcohol use: Not Currently     Comment: socially    Drug use: No    Sexual activity: Not Currently     Partners: Male       Current Outpatient Medications   Medication Sig Dispense Refill    ferrous sulfate 325 (65 FE) MG EC tablet Take 1 tablet (325 mg total) by mouth once daily. 60 tablet 0    ibuprofen (ADVIL,MOTRIN) 600 MG tablet Take 1 tablet (600 mg total) by mouth every 6 (six) hours as needed for Pain. 60 tablet 1    prenatal vit/iron fum/folic ac (PRENATAL 1+1 ORAL) Take by mouth.       27 mg iron- 1 mg Tab Take 1 tablet by mouth.      ustekinumab (STELARA) 90 mg/mL Syrg syringe Inject 1 mL (90 mg total) into the skin every 8 weeks. 1 mL 2    oxyCODONE-acetaminophen (PERCOCET) 5-325 mg per tablet Take 1 tablet by mouth every 4 (four) hours as needed for Pain. (Patient not taking: Reported on 2/23/2023) 20 each 0     No current facility-administered medications for this visit.       Review of patient's allergies indicates:  No Known Allergies    Review of System:   General: no chills, fever, night sweats, weight gain or weight loss  Psychological: no depression or suicidal ideation  Breasts: no new or changing breast lumps, nipple discharge or masses.  Respiratory: no cough, shortness of breath, or wheezing  Cardiovascular: no chest pain or dyspnea on exertion  Gastrointestinal: no abdominal pain, change in bowel habits, or black or bloody stools  Genito-Urinary: no incontinence, urinary frequency/urgency or vulvar/vaginal  symptoms, pelvic pain or abnormal vaginal bleeding.  Musculoskeletal: no gait disturbance or muscular weakness     VSS  Abdomen soft NT Incision well approx no erythema d/c or crepitance      Discussed appropriate healing and restrictions  RTO 6 weeks final PP Po eval

## 2023-02-27 ENCOUNTER — LAB VISIT (OUTPATIENT)
Dept: LAB | Facility: HOSPITAL | Age: 23
End: 2023-02-27
Attending: INTERNAL MEDICINE
Payer: MEDICAID

## 2023-02-27 ENCOUNTER — CLINICAL SUPPORT (OUTPATIENT)
Dept: GASTROENTEROLOGY | Facility: CLINIC | Age: 23
End: 2023-02-27
Payer: MEDICAID

## 2023-02-27 DIAGNOSIS — K50.819 CROHN'S DISEASE OF SMALL AND LARGE INTESTINES WITH COMPLICATION: Primary | ICD-10-CM

## 2023-02-27 DIAGNOSIS — K50.819 CROHN'S DISEASE OF SMALL AND LARGE INTESTINES WITH COMPLICATION: ICD-10-CM

## 2023-02-27 PROCEDURE — 80299 QUANTITATIVE ASSAY DRUG: CPT | Performed by: INTERNAL MEDICINE

## 2023-02-27 PROCEDURE — 83520 IMMUNOASSAY QUANT NOS NONAB: CPT | Performed by: INTERNAL MEDICINE

## 2023-02-27 PROCEDURE — 36415 COLL VENOUS BLD VENIPUNCTURE: CPT | Mod: PO | Performed by: INTERNAL MEDICINE

## 2023-02-27 NOTE — Clinical Note
She is doing well overall. Missed lab appointment in January. We rescheduled her lab appt yesterday after the virtual visit, but failed to link up all the orders. Pt only got stelara levels, which are pending. CBC, CMP recently updated by OBGYN. TB is outdated. Are you okay to get her scheduled for the rest of the labs on 3/22 same day as her OBGYN appointment? Kanwal is helping reschedule her AES appointment.   Thank you,  Lisset

## 2023-02-27 NOTE — PROGRESS NOTES
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 After delivery reports 1 BM a day  Nocturnal BM: pre delivery 1-2 times a month   Blood/ Mucus: one occasion of blood on TP since delivery    Abdominal pain: no   Nausea/ vomiting: no   Answers submitted by the patient for this visit:  Established Patient Questionnaire  (Submitted on 2/27/2023)  rash: Yes  back pain: Yes  Symptoms of infection (current or past 1 week)? (fever >100.4 F, URI, flu-like symptoms, cough, painful urination, warm/red/painful skin or skin ulcers/wounds, tooth pain): No  Recent Antibiotics use in the last 30 days No  Dispensing pharmacy/infusion center: [facility]  Current therapy: Stelara every 8 weeks (started 2/1/22, first injection 10/21/22, LD 1/3/22, ND 2/28/23 then 4/25)  2/13 - delivery via c section without any complications. Pt d/c and sent home on 2/15. Baby and mom are doing well.   2/23 - post op visit: no signs of infection or complications   Pt reports changing detergent and has been experiencing a rash mainly in the breast area, improved with antihistamines   Has dentist appointment next week due to history of tooth decay with both pregnancies. Denies any fever chills or any signs of tooth infection.   Missed [facility] appt on 1/12 and lab appt for UST trough levels on 1/3    Therapeutic Drug Monitoring Labs:  None     Prior IBD Therapies:  Infliximab (unclear if remicade or inflectra per records, 5 mg/kg 12/15/21, 5 mg/kg 12/31/21)- discontinued due to left sided numbness/tingling that  progressed to LUE/LLE after 2nd infusion, imaging of brain nor neurology consulted, discontinued   Prednisone- effective      Vaccinations:  Lab Results   Component Value Date    HEPBSAB <3.10 10/13/2021     No results found for: HEPBSURFABQU  No results found for: HEPAIGG  No results found for: VARICELLAZOS, VARICELLAINT  Immunization History   Administered Date(s) Administered    COVID-19, vector-nr, rS-Ad26, PF (Ashley) 10/25/2021    DTaP 01/12/1999, 02/22/1999, 05/26/1999, 01/11/2000, 09/25/2002    HIB 01/12/1999, 02/22/1999, 05/26/1999, 01/11/2000    Hepatitis B 1998, 1998, 08/25/1999    Hib-HbOC 01/11/2000    IPV 01/12/1999, 02/22/1999, 12/08/1999, 09/25/2002    Influenza 10/28/2011    Influenza (Flumist) - Quadrivalent - Intranasal *Preferred* (2-49 years old) 12/07/2015    Influenza - Quadrivalent - PF *Preferred* (6 months and older) 01/06/2019, 12/15/2022    Influenza - Trivalent (ADULT) 11/20/2002, 11/04/2003, 01/05/2005    Influenza - Trivalent - PF (ADULT) 11/20/2002, 11/04/2003, 01/05/2005, 10/28/2011    MMR 01/11/2000, 09/25/2002    Meningococcal Conjugate (MCV4P) 01/26/2010, 04/21/2015    OPV 12/08/1999    Rho (D) Immune Globulin 11/16/2018    Rho (D) Immune Globulin - IM 08/02/2018, 01/05/2019, 12/12/2022, 02/14/2023    Tdap 03/24/2010, 01/06/2019, 02/15/2023    Varicella 12/08/1999, 01/26/2010     Influenza:  completed   PCV 20: recommended, to be aligned with future appointments   Tetanus (TdaP): booster recommended every 10 years, repeat 2033  HPV: recommended      Meningococcal: recommended   Hepatitis B:  check immunity   Hepatitis A:  check immunity   MMR (live vaccine):   check immunity      Chickenpox status/Varicella (live vaccine):check immunity   Shingrix: recommended 2 doses 2-6 months apart.   Covid:  eligible for booster.       All Medical History/Surgical History/Family History/Social History/Allergies have been reviewed and updated in EMR    Review of patient's allergies  indicates:  No Known Allergies      Current Medications:   Outpatient Medications Marked as Taking for the 2/27/23 encounter (Clinical Support) with IBD PHARMACIST   Medication Sig Dispense Refill    ferrous sulfate 325 (65 FE) MG EC tablet Take 1 tablet (325 mg total) by mouth once daily. 60 tablet 0    prenatal vit/iron fum/folic ac (PRENATAL 1+1 ORAL) Take by mouth.      ustekinumab (STELARA) 90 mg/mL Syrg syringe Inject 1 mL (90 mg total) into the skin every 8 weeks. 1 mL 2       Reviewed all current medications including OTC, herbals and supplements.     Labs:   Lab Results   Component Value Date    HEPBSAG Negative 02/13/2023     No results found for: TBGOLDPLUS  Lab Results   Component Value Date    YBWHMBJG09 578 01/25/2022     Lab Results   Component Value Date    WBC 10.52 02/14/2023    HGB 7.3 (L) 02/14/2023    HCT 23.8 (L) 02/14/2023    MCV 71 (L) 02/14/2023     02/14/2023     Lab Results   Component Value Date    CREATININE 0.66 01/27/2023    ALBUMIN 3.3 (L) 01/27/2023    BILITOT 0.6 01/27/2023    ALKPHOS 132 01/27/2023    AST 24 01/27/2023    ALT 11 01/27/2023         Assessment/Plan:  Mindi Tate is a 22 y.o. 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 Patient presents for a virtual visit with IBD pharmacist to get clearance prior to restarting Stelara post C section. Patient noticed decrease in amount of BM since the C section but has been taking percocet to help with the pain, which could be contributing. Recently saw OBGYN and incision area is healing well. Patient denies any fever, chills, redness,  itchiness, erythema, or drainage from area of incision. She plans to see a dentist next week for tooth decay, but denies having an active tooth infection at this time. Overall she reports feeling well and has no main complaints today.     # Recommendations:  - Scheduled repeat stelara levels today, ND due tomorrow 2/28, pt missed appt in January   - Continue stelara 90mg every 8 weeks.   - Plan to inject ND tomorrow on 2/28 then 4/25 unless told otherwise based on drug levels.  - Reached out to [facility] to reschedule appt missed in January.  - CBC 2/2023, CMP 1/2023. Repeat 7/2023.  - TB, Hep B, and serologies were scheduled for 1/2023, but pt missed lab appointment. Reschedule now.   - Patient verbalized understanding instructions.     Lisset Arana, PharmD  IBD Clinical Pharmacist

## 2023-03-03 LAB
FUAUA DOSE (IN MG): NORMAL
FUAUA INTERVAL (IN WEEKS): NORMAL
USTEKINUMAB AB SERPL IA-ACNC: <10 AU/ML
USTEKINUMAB SERPL IA-MCNC: 1 MCG/ML

## 2023-03-10 ENCOUNTER — PATIENT MESSAGE (OUTPATIENT)
Dept: GASTROENTEROLOGY | Facility: CLINIC | Age: 23
End: 2023-03-10
Payer: MEDICAID

## 2023-03-10 ENCOUNTER — TELEPHONE (OUTPATIENT)
Dept: GASTROENTEROLOGY | Facility: CLINIC | Age: 23
End: 2023-03-10
Payer: MEDICAID

## 2023-03-10 DIAGNOSIS — K50.819 CROHN'S DISEASE OF SMALL AND LARGE INTESTINES WITH COMPLICATION: Primary | ICD-10-CM

## 2023-03-10 RX ORDER — USTEKINUMAB 90 MG/ML
90 INJECTION, SOLUTION SUBCUTANEOUS
Qty: 1 ML | Refills: 2 | Status: SHIPPED | OUTPATIENT
Start: 2023-03-10 | End: 2023-08-04 | Stop reason: SDUPTHER

## 2023-03-10 NOTE — TELEPHONE ENCOUNTER
Called patient to discuss dose optimization due to low UST drug levels. Unable to reach or leave a  (not set up).     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  - UST trough levels prior to LD were low at 1.0 without Ab  - Plan is to optimize dose from Stelara 90mg SC every 8 weeks to Stelara 90mg every 6 weeks  - New script sent to [facility].   - Case discussed with Dr. Rodríguez.   - Will attempt to contact pt again to discuss the plan in detail.

## 2023-03-15 ENCOUNTER — SPECIALTY PHARMACY (OUTPATIENT)
Dept: PHARMACY | Facility: CLINIC | Age: 23
End: 2023-03-15
Payer: MEDICAID

## 2023-03-15 NOTE — TELEPHONE ENCOUNTER
Stelara every 6 weeks key CMM KEY [id number], will continue to follow up until determination is made.

## 2023-03-16 NOTE — TELEPHONE ENCOUNTER
Outgoing call to Sharon PARKER, with [employer], 24 hours before determination is made.

## 2023-03-17 ENCOUNTER — TELEPHONE (OUTPATIENT)
Dept: GASTROENTEROLOGY | Facility: CLINIC | Age: 23
End: 2023-03-17
Payer: MEDICAID

## 2023-03-17 NOTE — TELEPHONE ENCOUNTER
Dante 6 weeks authorization denied due not meeting FDA requirements of medically accepted dosage. Will submit appeal.

## 2023-03-17 NOTE — TELEPHONE ENCOUNTER
Called and spoke with patient   - Reviewed low stelara drug levels   - Discussed need for dose optimization   - Informed patient that initial PA was denied but [facility] is working on the appeal  - Current therapy: Stelara every 8 weeks (started 2/1/22, first injection 10/21/22, LD 1/3/22, ND 2/28/23 then 4/25)  - If approved on time plan for ND on 4/10  - Patient verbalized understanding instructions   - All questions answered

## 2023-03-20 NOTE — TELEPHONE ENCOUNTER
Outgoing call to patient, patient would like AOR sent to her email, informed patient to complete AOR and return to [facility]. Once AOR is received [facility] will submit appeal. Will continue to follow up.

## 2023-03-21 NOTE — TELEPHONE ENCOUNTER
Pt called [facility] stating she has not received AOR form via email.  Call then ended abruptly. Routing to assigned [facility].

## 2023-03-22 NOTE — TELEPHONE ENCOUNTER
AOR received , faxed appeal to [facility] at [phone], will continue to follow up until determination is made.

## 2023-03-23 NOTE — TELEPHONE ENCOUNTER
Ariella with [facility] called to inform us that the urgent appeal was received. Phone number for contact is [phone]. Routing

## 2023-03-23 NOTE — TELEPHONE ENCOUNTER
Incoming call from [facility] appeals department, per Rosita SÁNCHEZ, appeal is being cancelled. Per rep, the plan covers Stelara as 2 ml every 84 days supply so it currently does not need a new authorization. Per plan [facility] should be able to run claim on 3/31/23.     Will notify patient and attempt to rerun claim on 3/31/23.

## 2023-03-24 ENCOUNTER — TELEPHONE (OUTPATIENT)
Dept: GASTROENTEROLOGY | Facility: CLINIC | Age: 23
End: 2023-03-24
Payer: MEDICAID

## 2023-03-24 NOTE — TELEPHONE ENCOUNTER
----- Message from Pooja Dickinson RN sent at 3/23/2023 11:40 AM CDT -----  Regarding: FW: Authorization  Contact: Rosita     ----- Message -----  From: Tevin PARKER Route  Sent: 3/23/2023  11:04 AM CDT  To: Marcos Reed Staff  Subject: Authorization                                    Rosita with [facility] is calling in ref to pt. Meds  ustekinumab (STELARA) 90 mg/mL Syrg syringe.  Rosita says she needs to speak to someone to verify dosage. Best Call Back Number Rosita    [phone] ASAP

## 2023-03-28 NOTE — TELEPHONE ENCOUNTER
Outgoing call to patient informed patient of previous note, per patient last dose of Stelara was on 2/28/23 next dose based on 6 weeks would fall on 4/11/23, informed patient [facility] will run test claim on 3/31 and follow back up to schedule shipment on 4/4/23. Patient expressed understanding.

## 2023-03-31 NOTE — TELEPHONE ENCOUNTER
Test claim pays for Stelara 1 ml per 42 days, will call patient to set up fill after 11:30 am during her lunch hour.

## 2023-04-03 PROBLEM — K85.90 ACUTE PANCREATITIS WITHOUT NECROSIS OR INFECTION, UNSPECIFIED: Status: ACTIVE | Noted: 2022-12-17

## 2023-04-04 NOTE — TELEPHONE ENCOUNTER
Specialty Pharmacy - Medication/Referral Authorization  Specialty Pharmacy - Refill Coordination    Specialty Medication Orders Linked to Encounter      Flowsheet Row Most Recent Value   Medication #1 ustekinumab (STELARA) 90 mg/mL Syrg syringe (Order#[id number], Rx#)            Refill Questions - Documented Responses      Flowsheet Row Most Recent Value   Patient Availability and HIPAA Verification    Does patient want to proceed with activity? Yes   HIPAA/medical authority confirmed? Yes   Relationship to patient of person spoken to? Self   Refill Screening Questions    Changes to allergies? No   Changes to medications? No   New conditions since last clinic visit? No   Unplanned office visit, urgent care, ED, or hospital admission in the last 4 weeks? No   How does patient/caregiver feel medication is working? Good   Financial problems or insurance changes? No   How many doses of your specialty medications were missed in the last 4 weeks? 0   Would patient like to speak to a pharmacist? No   When does the patient need to receive the medication? 04/05/23   Refill Delivery Questions    How will the patient receive the medication? MEDRx   When does the patient need to receive the medication? 04/05/23   Shipping Address Home   Address in [facility] confirmed and updated if neccessary? Yes   Expected Copay ($) 0   Is the patient able to afford the medication copay? Yes   Payment Method zero copay   Days supply of Refill 42   Supplies needed? No supplies needed   Refill activity completed? Yes   Refill activity plan Refill scheduled   Shipment/Pickup Date: 04/05/23            Current Outpatient Medications   Medication Sig    ferrous sulfate 325 (65 FE) MG EC tablet Take 1 tablet (325 mg total) by mouth once daily.    ibuprofen (ADVIL,MOTRIN) 600 MG tablet Take 1 tablet (600 mg total) by mouth every 6 (six) hours as needed for Pain. (Patient not taking: Reported on 2/27/2023)    oxyCODONE-acetaminophen (PERCOCET)  5-325 mg per tablet Take 1 tablet by mouth every 4 (four) hours as needed for Pain. (Patient not taking: Reported on 2/23/2023)    prenatal vit/iron fum/folic ac (PRENATAL 1+1 ORAL) Take by mouth.     27 mg iron- 1 mg Tab Take 1 tablet by mouth.    ustekinumab (STELARA) 90 mg/mL Syrg syringe Inject 1 mL (90 mg total) into the skin every 6 weeks.   Last reviewed on 2/23/2023 11:37 AM by Suki Hirsch MA    Review of patient's allergies indicates:  No Known Allergies Last reviewed on  3/10/2023 3:10 PM by Lisset Arana      Tasks added this encounter   5/10/2023 - Refill Call (Auto Added)   Tasks due within next 3 months   No tasks due.     Daysi Shaffer, PharmD  [facility]  [address]  [facility] [address]  Phone: [phone]  Fax: [phone]

## 2023-04-20 ENCOUNTER — TELEPHONE (OUTPATIENT)
Dept: ENDOSCOPY | Facility: HOSPITAL | Age: 23
End: 2023-04-20
Payer: MEDICAID

## 2023-04-20 NOTE — TELEPHONE ENCOUNTER
April 20, 2023  Derek Rodríguez MD  to Me  Robbie Jaramillo MD         4:10 PM   She had baby on 2/23/23   I don't see any reason it would be issue based on review of her records and we often do scopes 2 mos postpartum. She may be breastfeeding in which case propofol will be used and is safe to breastfeed     SS             3:32 PM  You routed this conversation to MD Derek Stevens MD    Me          3:31 PM  Note  Good afternoon,     Patient is scheduled to have a colonoscopy on Monday 4/24/23.  Is patient cleared from an OB standpoint to proceed with a colonoscopy?  Please advise.     Thanks,     Migdalia

## 2023-04-20 NOTE — TELEPHONE ENCOUNTER
Good afternoon,    Patient is scheduled to have a colonoscopy on Monday 4/24/23.  Is patient cleared from an OB standpoint to proceed with a colonoscopy?  Please advise.    ThanksMigdalia

## 2023-04-21 NOTE — TELEPHONE ENCOUNTER
April 21, 2023  Robbie Jaramillo MD  to Derek Rodríguez MD  Me         7:32 AM   No contraindication

## 2023-04-24 ENCOUNTER — TELEPHONE (OUTPATIENT)
Dept: ENDOSCOPY | Facility: HOSPITAL | Age: 23
End: 2023-04-24
Payer: MEDICAID

## 2023-04-24 VITALS — WEIGHT: 215 LBS | HEIGHT: 69 IN | BODY MASS INDEX: 31.84 KG/M2

## 2023-05-02 ENCOUNTER — TELEPHONE (OUTPATIENT)
Dept: OBSTETRICS AND GYNECOLOGY | Facility: CLINIC | Age: 23
End: 2023-05-02
Payer: MEDICAID

## 2023-05-02 NOTE — TELEPHONE ENCOUNTER
----- Message from Gila Washington sent at 5/2/2023 12:04 PM CDT -----  Contact: Nba chaney/ BAKARI  Type:  Needs Medical Advice    Who Called: Nba  Would the patient rather a call back or a response via [facility]? Call Nba  Best Call Back Number: [phone]  Additional Information: Nba is calling in regards to pt's sterilization consent form. Please call Nba back to advise.

## 2023-05-05 ENCOUNTER — TELEPHONE (OUTPATIENT)
Dept: GASTROENTEROLOGY | Facility: CLINIC | Age: 23
End: 2023-05-05
Payer: MEDICAID

## 2023-05-05 NOTE — TELEPHONE ENCOUNTER
Called & spoke to pt  - Looking to reschedule colonoscopy d/t having 2 other appts scheduled same day  - Informed of plan to coordinate labs & MRE same day as scope  - Pt plans to submit stool sample Monday (calprotectin & H. Pylori)  - Timing of f/u TBD based on timing of scope  - Pt does not wish to r/s pancrease cyst clinic appt at this time d/t not feeling like she needs to see them  - Will have endo scheduling contact pt to reschedule  - Reminder set to coordinate other appts

## 2023-05-08 ENCOUNTER — PATIENT MESSAGE (OUTPATIENT)
Dept: ENDOSCOPY | Facility: HOSPITAL | Age: 23
End: 2023-05-08
Payer: MEDICAID

## 2023-05-10 ENCOUNTER — SPECIALTY PHARMACY (OUTPATIENT)
Dept: PHARMACY | Facility: CLINIC | Age: 23
End: 2023-05-10
Payer: MEDICAID

## 2023-05-10 NOTE — TELEPHONE ENCOUNTER
Outgoing call regarding Stelara refill. PT stated she is every 6 week and is due to inject on 5/23/23. Insurance rejected qty exceeded. Pt stated we can call her back on 5/16/23 to follow up for refill. Looks like Insurance is need PA. Routing to assigned pharmacist.

## 2023-05-17 NOTE — TELEPHONE ENCOUNTER
Specialty Pharmacy - Refill Coordination    Specialty Medication Orders Linked to Encounter      Flowsheet Row Most Recent Value   Medication #1 ustekinumab (STELARA) 90 mg/mL Syrg syringe (Order#[id number], Rx#[id number])            Refill Questions - Documented Responses      Flowsheet Row Most Recent Value   Refill Screening Questions    Changes to allergies? No   Changes to medications? No   New conditions since last clinic visit? No   Unplanned office visit, urgent care, ED, or hospital admission in the last 4 weeks? No   How does patient/caregiver feel medication is working? Very good   Financial problems or insurance changes? No   How many doses of your specialty medications were missed in the last 4 weeks? 0   Would patient like to speak to a pharmacist? No   When does the patient need to receive the medication? 05/23/23   Refill Delivery Questions    How will the patient receive the medication? MEDRx   When does the patient need to receive the medication? 05/23/23   Shipping Address Home   Address in [facility] confirmed and updated if neccessary? Yes   Expected Copay ($) 0   Is the patient able to afford the medication copay? Yes   Payment Method zero copay   Days supply of Refill 42   Supplies needed? No supplies needed   Refill activity completed? Yes   Refill activity plan Refill scheduled   Shipment/Pickup Date: 05/18/23            Current Outpatient Medications   Medication Sig    ferrous sulfate 325 (65 FE) MG EC tablet Take 1 tablet (325 mg total) by mouth once daily.    ibuprofen (ADVIL,MOTRIN) 600 MG tablet Take 1 tablet (600 mg total) by mouth every 6 (six) hours as needed for Pain. (Patient not taking: Reported on 2/27/2023)    oxyCODONE-acetaminophen (PERCOCET) 5-325 mg per tablet Take 1 tablet by mouth every 4 (four) hours as needed for Pain. (Patient not taking: Reported on 2/23/2023)    prenatal vit/iron fum/folic ac (PRENATAL 1+1 ORAL) Take by mouth.     27 mg iron- 1 mg Tab  Take 1 tablet by mouth.    ustekinumab (STELARA) 90 mg/mL Syrg syringe Inject 1 mL (90 mg total) into the skin every 6 weeks.   Last reviewed on 4/24/2023 10:15 AM by Mindi Bustillos RN    Review of patient's allergies indicates:  No Known Allergies Last reviewed on  4/24/2023 10:15 AM by Mindi Bustillos      Tasks added this encounter   No tasks added.   Tasks due within next 3 months   5/16/2023 - Refill Coordination Outreach (1 time occurrence)     Daysi Shaffer, PharmD  [facility]  [address] [place] [address]  Phone: [phone]  Fax: [phone]

## 2023-06-22 ENCOUNTER — SPECIALTY PHARMACY (OUTPATIENT)
Dept: PHARMACY | Facility: CLINIC | Age: 23
End: 2023-06-22
Payer: MEDICAID

## 2023-06-28 NOTE — TELEPHONE ENCOUNTER
Specialty Pharmacy - Refill Coordination    Specialty Medication Orders Linked to Encounter      Flowsheet Row Most Recent Value   Medication #1 ustekinumab (STELARA) 90 mg/mL Syrg syringe (Order#[id number], Rx#[id number])            Refill Questions - Documented Responses      Flowsheet Row Most Recent Value   Patient Availability and HIPAA Verification    Does patient want to proceed with activity? Yes   HIPAA/medical authority confirmed? Yes   Relationship to patient of person spoken to? Self   Refill Screening Questions    Changes to allergies? No   Changes to medications? No   New conditions since last clinic visit? No   Unplanned office visit, urgent care, ED, or hospital admission in the last 4 weeks? No   How does patient/caregiver feel medication is working? Good   Financial problems or insurance changes? No   How many doses of your specialty medications were missed in the last 4 weeks? 0   Would patient like to speak to a pharmacist? No   When does the patient need to receive the medication? 07/04/23   Refill Delivery Questions    How will the patient receive the medication? MEDRx   When does the patient need to receive the medication? 07/04/23   Shipping Address Home   Address in [facility] confirmed and updated if neccessary? Yes   Expected Copay ($) 0   Is the patient able to afford the medication copay? Yes   Payment Method zero copay   Days supply of Refill 42   Supplies needed? No supplies needed   Refill activity completed? Yes   Refill activity plan Refill scheduled   Shipment/Pickup Date: 06/29/23            Current Outpatient Medications   Medication Sig    ferrous sulfate 325 (65 FE) MG EC tablet Take 1 tablet (325 mg total) by mouth once daily.    ibuprofen (ADVIL,MOTRIN) 600 MG tablet Take 1 tablet (600 mg total) by mouth every 6 (six) hours as needed for Pain. (Patient not taking: Reported on 2/27/2023)    oxyCODONE-acetaminophen (PERCOCET) 5-325 mg per tablet Take 1 tablet by mouth  every 4 (four) hours as needed for Pain. (Patient not taking: Reported on 2/23/2023)    prenatal vit/iron fum/folic ac (PRENATAL 1+1 ORAL) Take by mouth.     27 mg iron- 1 mg Tab Take 1 tablet by mouth.    ustekinumab (STELARA) 90 mg/mL Syrg syringe Inject 1 mL (90 mg total) into the skin every 6 weeks.   Last reviewed on 4/24/2023 10:15 AM by Mindi Bustillos RN    Review of patient's allergies indicates:  No Known Allergies Last reviewed on  4/24/2023 10:15 AM by Mindi Bustillos      Tasks added this encounter   No tasks added.   Tasks due within next 3 months   9/27/2023 - Clinical Assessment (1 year recurrence)     [facility]  [address] [place] [id number]  Phone: [phone]  Fax: [phone]

## 2023-06-29 DIAGNOSIS — K50.813 CROHN'S DISEASE OF BOTH SMALL AND LARGE INTESTINE WITH FISTULA: Primary | ICD-10-CM

## 2023-07-03 ENCOUNTER — PATIENT MESSAGE (OUTPATIENT)
Dept: GASTROENTEROLOGY | Facility: CLINIC | Age: 23
End: 2023-07-03
Payer: MEDICAID

## 2023-07-05 ENCOUNTER — TELEPHONE (OUTPATIENT)
Dept: ENDOSCOPY | Facility: HOSPITAL | Age: 23
End: 2023-07-05
Payer: MEDICAID

## 2023-07-05 NOTE — TELEPHONE ENCOUNTER
Contacted the patient to schedule an endoscopy procedure(s) . The patient did not answer the call and left a voice message requesting a call back.

## 2023-07-10 ENCOUNTER — TELEPHONE (OUTPATIENT)
Dept: GASTROENTEROLOGY | Facility: CLINIC | Age: 23
End: 2023-07-10
Payer: MEDICAID

## 2023-07-28 ENCOUNTER — PATIENT MESSAGE (OUTPATIENT)
Dept: GASTROENTEROLOGY | Facility: CLINIC | Age: 23
End: 2023-07-28
Payer: MEDICAID

## 2023-07-28 ENCOUNTER — HOSPITAL ENCOUNTER (OUTPATIENT)
Dept: RADIOLOGY | Facility: HOSPITAL | Age: 23
Discharge: HOME | End: 2023-07-28
Attending: INTERNAL MEDICINE
Payer: MEDICAID

## 2023-07-28 DIAGNOSIS — K50.819 CROHN'S DISEASE OF SMALL AND LARGE INTESTINES WITH COMPLICATION: ICD-10-CM

## 2023-07-28 PROCEDURE — 72197 MRI ENTEROGRAPHY (XPD): ICD-10-PCS | Mod: 26,,, | Performed by: STUDENT IN AN ORGANIZED HEALTH CARE EDUCATION/TRAINING PROGRAM

## 2023-07-28 PROCEDURE — 74183 MRI ABD W/O CNTR FLWD CNTR: CPT | Mod: 26,,, | Performed by: STUDENT IN AN ORGANIZED HEALTH CARE EDUCATION/TRAINING PROGRAM

## 2023-07-28 PROCEDURE — 72197 MRI PELVIS W/O & W/DYE: CPT | Mod: TC

## 2023-07-28 PROCEDURE — A9585 GADOBUTROL INJECTION: HCPCS | Performed by: INTERNAL MEDICINE

## 2023-07-28 PROCEDURE — 25500020 PHARM REV CODE 255: Performed by: INTERNAL MEDICINE

## 2023-07-28 PROCEDURE — 72197 MRI PELVIS W/O & W/DYE: CPT | Mod: 26,,, | Performed by: STUDENT IN AN ORGANIZED HEALTH CARE EDUCATION/TRAINING PROGRAM

## 2023-07-28 PROCEDURE — 74183 MRI ENTEROGRAPHY (XPD): ICD-10-PCS | Mod: 26,,, | Performed by: STUDENT IN AN ORGANIZED HEALTH CARE EDUCATION/TRAINING PROGRAM

## 2023-07-28 RX ORDER — GADOBUTROL 604.72 MG/ML
10 INJECTION INTRAVENOUS
Status: COMPLETED | OUTPATIENT
Start: 2023-07-28 | End: 2023-07-28

## 2023-07-28 RX ADMIN — GADOBUTROL 10 ML: 604.72 INJECTION INTRAVENOUS at 02:07

## 2023-08-03 ENCOUNTER — SPECIALTY PHARMACY (OUTPATIENT)
Dept: PHARMACY | Facility: CLINIC | Age: 23
End: 2023-08-03
Payer: MEDICAID

## 2023-08-03 NOTE — TELEPHONE ENCOUNTER
Outgoing call regarding refill. Pt stated due to inject 8/15. Informed pt out of refills and [facility] will follow up once refill authorization received.

## 2023-08-04 DIAGNOSIS — K50.819 CROHN'S DISEASE OF SMALL AND LARGE INTESTINES WITH COMPLICATION: ICD-10-CM

## 2023-08-07 RX ORDER — USTEKINUMAB 90 MG/ML
90 INJECTION, SOLUTION SUBCUTANEOUS
Qty: 1 ML | Refills: 0 | Status: ACTIVE | OUTPATIENT
Start: 2023-08-07 | End: 2023-09-18 | Stop reason: SDUPTHER

## 2023-08-11 NOTE — TELEPHONE ENCOUNTER
Specialty Pharmacy - Refill Coordination    Specialty Medication Orders Linked to Encounter      Flowsheet Row Most Recent Value   Medication #1 ustekinumab (STELARA) 90 mg/mL Syrg syringe (Order#[id number], Rx#[id number])            Refill Questions - Documented Responses      Flowsheet Row Most Recent Value   Patient Availability and HIPAA Verification    Does patient want to proceed with activity? Yes   HIPAA/medical authority confirmed? Yes   Relationship to patient of person spoken to? Self   Refill Screening Questions    Changes to allergies? No   Changes to medications? No   New conditions since last clinic visit? Yes  [patient went to dental visit for tooth pain, giving amoxil but never started taking medication. Counseled patient to hold Stelara if any s/sx of infection.]   Unplanned office visit, urgent care, ED, or hospital admission in the last 4 weeks? Yes  [dental office visit]   How does patient/caregiver feel medication is working? Good   Financial problems or insurance changes? No   How many doses of your specialty medications were missed in the last 4 weeks? 0   Would patient like to speak to a pharmacist? No   When does the patient need to receive the medication? 08/15/23   Refill Delivery Questions    How will the patient receive the medication? MEDRx   When does the patient need to receive the medication? 08/15/23   Shipping Address Home   Address in [facility] confirmed and updated if neccessary? Yes   Expected Copay ($) 0   Is the patient able to afford the medication copay? Yes   Payment Method zero copay   Days supply of Refill 42   Supplies needed? No supplies needed   Refill activity completed? Yes   Refill activity plan Refill scheduled   Shipment/Pickup Date: 08/11/23            Current Outpatient Medications   Medication Sig    amoxicillin-clavulanate 875-125mg (AUGMENTIN) 875-125 mg per tablet Take 1 tablet by mouth 2 (two) times daily.    ferrous sulfate 325 (65 FE) MG EC  tablet Take 1 tablet (325 mg total) by mouth once daily.    HYDROcodone-acetaminophen (NORCO) 5-325 mg per tablet Take 1 tablet by mouth every 8 (eight) hours as needed for Pain.    ibuprofen (ADVIL,MOTRIN) 600 MG tablet Take 1 tablet (600 mg total) by mouth every 6 (six) hours as needed for Pain. (Patient not taking: Reported on 2/27/2023)    prenatal vit/iron fum/folic ac (PRENATAL 1+1 ORAL) Take by mouth.     27 mg iron- 1 mg Tab Take 1 tablet by mouth.    ustekinumab (STELARA) 90 mg/mL Syrg syringe Inject 1 mL (90 mg total) into the skin every 6 weeks.   Last reviewed on 8/9/2023 12:04 AM by Zoë Green RN    Review of patient's allergies indicates:  No Known Allergies Last reviewed on  8/8/2023 9:52 PM by Jaja Reddy      Tasks added this encounter   No tasks added.   Tasks due within next 3 months   9/27/2023 - Clinical Assessment (1 year recurrence)  8/11/2023 - Refill Coordination Outreach (1 time occurrence)     Daysi Shaffer, PharmD  [facility]  [address]  [facility] [address]  Phone: [phone]  Fax: [phone]

## 2023-09-18 DIAGNOSIS — K50.819 CROHN'S DISEASE OF SMALL AND LARGE INTESTINES WITH COMPLICATION: ICD-10-CM

## 2023-09-18 RX ORDER — USTEKINUMAB 90 MG/ML
90 INJECTION, SOLUTION SUBCUTANEOUS
Qty: 1 ML | Refills: 0 | Status: ACTIVE | OUTPATIENT
Start: 2023-09-18 | End: 2023-11-03 | Stop reason: SDUPTHER

## 2023-10-16 RX ORDER — ONDANSETRON 4 MG/1
TABLET, ORALLY DISINTEGRATING ORAL
Qty: 20 TABLET | Refills: 0 | Status: SHIPPED | OUTPATIENT
Start: 2023-10-16 | End: 2023-11-27 | Stop reason: SDUPTHER

## 2023-10-16 RX ORDER — ONDANSETRON 4 MG/1
TABLET, ORALLY DISINTEGRATING ORAL
Qty: 20 TABLET | Refills: 0 | Status: SHIPPED | OUTPATIENT
Start: 2023-10-16 | End: 2024-01-18 | Stop reason: CLARIF

## 2023-10-30 DIAGNOSIS — K50.819 CROHN'S DISEASE OF SMALL AND LARGE INTESTINES WITH COMPLICATION: ICD-10-CM

## 2023-10-30 RX ORDER — USTEKINUMAB 90 MG/ML
90 INJECTION, SOLUTION SUBCUTANEOUS
Qty: 1 ML | Refills: 0 | OUTPATIENT
Start: 2023-10-30

## 2023-11-03 ENCOUNTER — TELEPHONE (OUTPATIENT)
Dept: GASTROENTEROLOGY | Facility: CLINIC | Age: 23
End: 2023-11-03
Payer: MEDICAID

## 2023-11-03 DIAGNOSIS — K50.819 CROHN'S DISEASE OF SMALL AND LARGE INTESTINES WITH COMPLICATION: ICD-10-CM

## 2023-11-03 RX ORDER — USTEKINUMAB 90 MG/ML
90 INJECTION, SOLUTION SUBCUTANEOUS
Qty: 1 ML | Refills: 0 | Status: SHIPPED | OUTPATIENT
Start: 2023-11-03 | End: 2023-11-07 | Stop reason: SDUPTHER

## 2023-11-03 NOTE — TELEPHONE ENCOUNTER
Called & spoke to pt  - F/u appt w/ Lisset PharmD scheduled  - Will send in 1 refill of Stelara until pt can be seen in clinic  - Pt expressed understanding

## 2023-11-03 NOTE — TELEPHONE ENCOUNTER
----- Message from Radha Darby, Chel sent at 11/3/2023  9:42 AM CDT -----  Regarding: Dante  Good morning,    Patient states mobile number on file not currently active. Requests provider office contacts her at [phone] to schedule an office visit. Refill request was denied because she needed an appointment. Her next Stelara injection is due 11/7.    Thank you,    Radha Darby  Clinical Pharmacist  [facility]  Phone: [phone]  Fax: [phone]  Hours: Monday - Friday 8:30 - 5:00 PM

## 2023-11-07 ENCOUNTER — TELEPHONE (OUTPATIENT)
Dept: ENDOSCOPY | Facility: HOSPITAL | Age: 23
End: 2023-11-07
Payer: MEDICAID

## 2023-11-07 ENCOUNTER — PATIENT MESSAGE (OUTPATIENT)
Dept: GASTROENTEROLOGY | Facility: CLINIC | Age: 23
End: 2023-11-07
Payer: MEDICAID

## 2023-11-07 NOTE — TELEPHONE ENCOUNTER
"Spoke with Mindi:  - states she spoke with someone on 11/3 for 8 min, confirming delivery for 11/6    Upon review of chart, last CBC/CMP 9/7/2023 both with abnormalities - Hgb 11.8 (Improved from 11.2 & 7.3), AST 48 H (increased from 41), ALT 59 H (increased from 26). TB & Hep B done 7/28/23. Scheduled with Clinical PharmD 11/27/23 for OV. Rx for Stelara was renewed 11/3/23. Spoke with Specialty Pharmacy. They do not have an active Rx for Stelara as the renewal on 11/3 was sent to "Print" instead of to them. Verbal renewal for Stelara given. They will contact Mindi today to schedule either  for today or delivery for tomorrow.   "

## 2023-11-27 ENCOUNTER — TELEPHONE (OUTPATIENT)
Dept: ENDOSCOPY | Facility: HOSPITAL | Age: 23
End: 2023-11-27
Payer: MEDICAID

## 2023-11-27 ENCOUNTER — CLINICAL SUPPORT (OUTPATIENT)
Dept: GASTROENTEROLOGY | Facility: CLINIC | Age: 23
End: 2023-11-27
Payer: MEDICAID

## 2023-11-27 ENCOUNTER — LAB VISIT (OUTPATIENT)
Dept: LAB | Facility: HOSPITAL | Age: 23
End: 2023-11-27
Attending: INTERNAL MEDICINE
Payer: MEDICAID

## 2023-11-27 VITALS
WEIGHT: 293 LBS | HEIGHT: 70 IN | HEART RATE: 80 BPM | OXYGEN SATURATION: 98 % | BODY MASS INDEX: 41.95 KG/M2 | SYSTOLIC BLOOD PRESSURE: 120 MMHG | TEMPERATURE: 98 F | DIASTOLIC BLOOD PRESSURE: 73 MMHG

## 2023-11-27 VITALS — WEIGHT: 293 LBS | HEIGHT: 70 IN | BODY MASS INDEX: 41.95 KG/M2

## 2023-11-27 DIAGNOSIS — K50.819 CROHN'S DISEASE OF SMALL AND LARGE INTESTINES WITH COMPLICATION: Primary | ICD-10-CM

## 2023-11-27 DIAGNOSIS — Z12.11 SCREEN FOR COLON CANCER: Primary | ICD-10-CM

## 2023-11-27 DIAGNOSIS — K50.813 CROHN'S DISEASE OF BOTH SMALL AND LARGE INTESTINE WITH FISTULA: ICD-10-CM

## 2023-11-27 DIAGNOSIS — K50.813 CROHN'S DISEASE OF BOTH SMALL AND LARGE INTESTINE WITH FISTULA: Primary | ICD-10-CM

## 2023-11-27 LAB
ALBUMIN SERPL BCP-MCNC: 3.7 G/DL (ref 3.5–5.2)
ALP SERPL-CCNC: 81 U/L (ref 55–135)
ALT SERPL W/O P-5'-P-CCNC: 34 U/L (ref 10–44)
ANION GAP SERPL CALC-SCNC: 6 MMOL/L (ref 8–16)
AST SERPL-CCNC: 26 U/L (ref 10–40)
BASOPHILS # BLD AUTO: 0.06 K/UL (ref 0–0.2)
BASOPHILS NFR BLD: 0.6 % (ref 0–1.9)
BILIRUB SERPL-MCNC: 0.4 MG/DL (ref 0.1–1)
BUN SERPL-MCNC: 13 MG/DL (ref 6–20)
CALCIUM SERPL-MCNC: 9.3 MG/DL (ref 8.7–10.5)
CHLORIDE SERPL-SCNC: 107 MMOL/L (ref 95–110)
CO2 SERPL-SCNC: 28 MMOL/L (ref 23–29)
CREAT SERPL-MCNC: 0.9 MG/DL (ref 0.5–1.4)
DIFFERENTIAL METHOD: ABNORMAL
EOSINOPHIL # BLD AUTO: 0.2 K/UL (ref 0–0.5)
EOSINOPHIL NFR BLD: 2.3 % (ref 0–8)
ERYTHROCYTE [DISTWIDTH] IN BLOOD BY AUTOMATED COUNT: 16.7 % (ref 11.5–14.5)
EST. GFR  (NO RACE VARIABLE): >60 ML/MIN/1.73 M^2
GLUCOSE SERPL-MCNC: 87 MG/DL (ref 70–110)
HCT VFR BLD AUTO: 35.5 % (ref 37–48.5)
HGB BLD-MCNC: 10.9 G/DL (ref 12–16)
IMM GRANULOCYTES # BLD AUTO: 0.02 K/UL (ref 0–0.04)
IMM GRANULOCYTES NFR BLD AUTO: 0.2 % (ref 0–0.5)
LYMPHOCYTES # BLD AUTO: 2.1 K/UL (ref 1–4.8)
LYMPHOCYTES NFR BLD: 22.2 % (ref 18–48)
MCH RBC QN AUTO: 24 PG (ref 27–31)
MCHC RBC AUTO-ENTMCNC: 30.7 G/DL (ref 32–36)
MCV RBC AUTO: 78 FL (ref 82–98)
MONOCYTES # BLD AUTO: 0.6 K/UL (ref 0.3–1)
MONOCYTES NFR BLD: 6.3 % (ref 4–15)
NEUTROPHILS # BLD AUTO: 6.3 K/UL (ref 1.8–7.7)
NEUTROPHILS NFR BLD: 68.4 % (ref 38–73)
NRBC BLD-RTO: 0 /100 WBC
PLATELET # BLD AUTO: 380 K/UL (ref 150–450)
PMV BLD AUTO: 10.8 FL (ref 9.2–12.9)
POTASSIUM SERPL-SCNC: 4.1 MMOL/L (ref 3.5–5.1)
PROT SERPL-MCNC: 7.8 G/DL (ref 6–8.4)
RBC # BLD AUTO: 4.54 M/UL (ref 4–5.4)
SODIUM SERPL-SCNC: 141 MMOL/L (ref 136–145)
WBC # BLD AUTO: 9.25 K/UL (ref 3.9–12.7)

## 2023-11-27 PROCEDURE — 36415 COLL VENOUS BLD VENIPUNCTURE: CPT | Performed by: INTERNAL MEDICINE

## 2023-11-27 PROCEDURE — 85025 COMPLETE CBC W/AUTO DIFF WBC: CPT | Performed by: INTERNAL MEDICINE

## 2023-11-27 PROCEDURE — 80053 COMPREHEN METABOLIC PANEL: CPT | Performed by: INTERNAL MEDICINE

## 2023-11-27 RX ORDER — ACETAMINOPHEN 500 MG
1000 TABLET ORAL DAILY
COMMUNITY

## 2023-11-27 RX ORDER — BUSPIRONE HYDROCHLORIDE 10 MG/1
10 TABLET ORAL DAILY
COMMUNITY

## 2023-11-27 RX ORDER — LIDOCAINE 50 MG/G
1 PATCH TOPICAL
COMMUNITY

## 2023-11-27 RX ORDER — LISDEXAMFETAMINE DIMESYLATE 40 MG/1
40 CAPSULE ORAL DAILY
COMMUNITY

## 2023-11-27 RX ORDER — PNV NO.95/FERROUS FUM/FOLIC AC 28MG-0.8MG
100 TABLET ORAL DAILY
COMMUNITY

## 2023-11-27 RX ORDER — ESCITALOPRAM OXALATE 10 MG/1
10 TABLET ORAL DAILY
COMMUNITY
End: 2024-01-18 | Stop reason: CLARIF

## 2023-11-27 NOTE — Clinical Note
Symptomatically she is stable. Has not been scoped since 2021. You saw her once in 12/2022 to establish care, cherie mac. Got her scheduled for EGD/colon 3/2024 as the first non urgent available slot. Handed her a stool kit. Let me know if you want to get the stool sample around the time of the scope or sooner. Also are you okay with seeing her after the scope for f/u?

## 2023-11-27 NOTE — PATIENT INSTRUCTIONS
- Vaccines due to immunocompromised state:    - Pneumonia vaccine (Prevnar 20)   - Shingles (shingrix) 2 doses 2-6 months apart    - Hepatitis A (Havrix) 2 doses 6-12 months apart    - Covid booster   - Flu shot   - continue stelara every 6 weeks, next dsoe 12/19  - skin exam with dermatologist   - colonoscopy and EGD scheduled today  - stool kit

## 2023-11-27 NOTE — TELEPHONE ENCOUNTER
"----- Message from Katrina Soto RN sent at 2023 11:02 AM CST -----  Procedure: EGD/Colonoscopy    Diagnosis: Crohn's disease    Procedure Timin-12 weeks - 1st available non-urgent    #If within 4 weeks selected, please yessica as high priority#    #If greater than 12 weeks, please select "5-12 weeks" and delay sending until 2 months prior to requested date#     Provider: KATHERIN Rodríguez    Location: [address]    Additional Scheduling Information: No scheduling concerns    Prep Specifications:Standard prep; ask pt    Is the patient taking a GLP-1 Agonist:no    Have you attached a patient to this message: yes      "

## 2023-11-27 NOTE — TELEPHONE ENCOUNTER
Spoke to Mindi to schedule procedure(s) Colonoscopy/EGD       Physician to perform procedure(s) Dr. JERRY Rodríguez  Date of Procedure (s) 03/18//24  Arrival Time 7:50 AM  Time of Procedure(s) 8:50 AM   Location of Procedure(s) [place] 4th Floor  Type of Rx Prep sent to patient: PEG  Instructions provided to patient via [facility]    Patient was informed on the following information and verbalized understanding. Screening questionnaire reviewed with patient and complete. If procedure requires anesthesia, a responsible adult needs to be present to accompany the patient home, patient cannot drive after receiving anesthesia. Appointment details are tentative, especially check-in time. Patient will receive a prep-op call 7 days prior to confirm check-in time for procedure. If applicable the patient should contact their pharmacy to verify Rx for procedure prep is ready for pick-up. Patient was advised to call the scheduling department at [phone] if pharmacy states no Rx is available. Patient was advised to call the endoscopy scheduling department if any questions or concerns arise.      [facility] Scheduling Department

## 2023-11-27 NOTE — PROGRESS NOTES
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 Since dose optimization in 4/2023 only one episode of nocturnal BM.   Blood/ Mucus: no   Abdominal pain: no   Nausea/ vomiting: no   Appetite: no changes   Rectal urgency: once or twice a month depending on what she eats  Symptoms of infection (current or past 1 week)? (fever >100.4 F, URI, flu-like symptoms, cough, painful urination, warm/red/painful skin or skin ulcers/wounds, tooth pain): No  Recent Antibiotics use in the last 30 days: No  Dispensing pharmacy/infusion center:    Current therapy: Stelara every 6 weeks (started 2/1/22, optimized to Q6W on 4/11/23, LD 11/8 but due on 11/7, ND 12/19)   12/2022 - established care with Dr. Rodríguez. 31 week pregnant, stool estela, EGD, colonoscopy, MRE ordered. No changes in treatment   2/13/2023 - healthy delivery via C section without complications   2/27/2023 - PharmD virtual visit, cleared to restart stelara   7/28/2023 - MRE normal, no bowel obstruction.    Multiple ER visits for different reasons: 6/26 (tooth infection), 8/22 (lowed abd pain), 9/7 (body weakness), 9/24 (knee injury)  8/22/2023 - CT abd/pelvis: Mild wall thickening of the terminal ileum with mild surrounding fat stranding concerning for mild active inflammation in this patient with Crohn's disease.  No evidence of bowel obstruction or penetrating disease.   Reports accidentally taking prednisone for a couple of days in 8/2023 thinking it was her lexapro. Increased appetite triggered her to realize the mix up and then self d/c.     Therapeutic Drug Monitoring  "Labs:  2/27/2023 - UST trough level 1.0/ neg Ab (stelara 90mg every 8 weeks)      Prior IBD Therapies:  Infliximab (unclear if remicade or inflectra per records, 5 mg/kg 12/15/21, 5 mg/kg 12/31/21)- discontinued due to left sided numbness/tingling that progressed to LUE/LLE after 2nd infusion, imaging of brain nor neurology consulted, discontinued   Prednisone- effective    Last pertinent Endoscopy/Imaging:  10/12/21 colonoscopy: numerous pseudopolyps with erythema starting at 65 cm just beyond the splenic flexure which became more intense from 75-80 cm though fistula could not be identified, diffuse area of severely congested, erythematous, eroded and pseudo polypoid mucosa in the ascending colon.  Biopsies of the ascending colon with moderate chronic active colitis, normal biopsies from every 10 cm from 10-50 cm.   10/12/21 MRE: extensive wall thickening in the cecum/TI with surrounding inflammation, fistulous connection from distal terminal ileum to cecum, extensive lymphadenopathy.   7/28/23 - MRE: Known Crohn's disease of the terminal ileum and cecum with no imaging signs of active inflammation.No imaging signs of stricture. No imaging signs of penetrating disease  8/22/23 - CT abd/pelvis: Mild wall thickening of the terminal ileum with mild surrounding fat stranding concerning for mild active inflammation in this patient with Crohn's disease.  No evidence of bowel obstruction or penetrating disease    Vaccinations:  Lab Results   Component Value Date    HEPBSAB <3.10 10/13/2021     No results found for: "HEPBSURFABQU"  Lab Results   Component Value Date    HEPAIGG Non-reactive 07/28/2023     Lab Results   Component Value Date    VARICELLAZOS 199.60 07/28/2023    VARICELLAINT Positive 07/28/2023     No results found for: "MUMPSIGGSCRE", "MUMPSIGGINTE"  No results found for: "RUBEOLAIGGAN", "RUBEOLAINTER"  Immunization History   Administered Date(s) Administered    COVID-19, vector-nr, rS-Ad26, PF (Ashley) " 10/25/2021    DTaP 01/12/1999, 02/22/1999, 05/26/1999, 01/11/2000, 09/25/2002    HIB 01/12/1999, 02/22/1999, 05/26/1999, 01/11/2000    Hepatitis B 1998, 1998, 08/25/1999    Hib-HbOC 01/11/2000    IPV 01/12/1999, 02/22/1999, 12/08/1999, 09/25/2002    Influenza 10/28/2011    Influenza (Flumist) - Quadrivalent - Intranasal *Preferred* (2-49 years old) 12/07/2015    Influenza - Quadrivalent - PF *Preferred* (6 months and older) 01/06/2019, 12/15/2022    Influenza - Trivalent (ADULT) 11/20/2002, 11/04/2003, 01/05/2005    Influenza - Trivalent - PF (ADULT) 11/20/2002, 11/04/2003, 01/05/2005, 10/28/2011    MMR 01/11/2000, 09/25/2002    Meningococcal Conjugate (MCV4P) 01/26/2010, 04/21/2015    OPV 12/08/1999    Rho (D) Immune Globulin 11/16/2018    Rho (D) Immune Globulin - IM 08/02/2018, 01/05/2019, 12/12/2022, 02/14/2023    Tdap 03/24/2010, 01/06/2019, 02/15/2023    Varicella 12/08/1999, 01/26/2010       Influenza:  discussed and recommended yearly flu shot. Pt deferred   PCV 20: discussed and recommended. Will get at her PCP visit next week  Tetanus (TdaP): booster every 10 year recommended. Repeat 2033  HPV: recommended 3 dose series      Hepatitis B: check immunity    Hepatitis A:  not immune. Discussed and reccommended havrix 2 doses 6-12 months apart   MMR (live vaccine): check immunity        Chickenpox status/Varicella (live vaccine): immune  Shingrix: discussed and recommended 2 doses 2-6 months apart. Will get at her PCP visit next week   Covid:  discussed and recommended booster.     All Medical History/Surgical History/Family History/Social History/Allergies have been reviewed and updated in EMR    Review of patient's allergies indicates:  No Known Allergies      Current Medications:   Outpatient Medications Marked as Taking for the 11/27/23 encounter (Clinical Support) with IBD PHARMACIST   Medication Sig Dispense Refill    busPIRone (BUSPAR) 10 MG tablet Take 10 mg by mouth once daily.       cholecalciferol, vitamin D3, 125 mcg (5,000 unit) Tab Take 1,000 Units by mouth once daily.      cyanocobalamin (VITAMIN B-12) 100 MCG tablet Take 100 mcg by mouth once daily.      EScitalopram oxalate (LEXAPRO) 10 MG tablet Take 10 mg by mouth once daily.      ferrous sulfate 325 (65 FE) MG EC tablet Take 1 tablet (325 mg total) by mouth once daily. 60 tablet 0    LIDOcaine (LIDODERM) 5 % Place 1 patch onto the skin every 24 hours. Remove & Discard patch within 12 hours or as directed by MD      lisdexamfetamine (VYVANSE) 40 MG Cap Take 40 mg by mouth once daily.      ondansetron (ZOFRAN-ODT) 4 MG TbDL DISSOLVE 1 TABLET IN MOUTH EVERY 6 HOURS AS NEEDED 20 tablet 0    ustekinumab (STELARA) 90 mg/mL Syrg syringe Inject 1 mL (90 mg total) into the skin every 6 weeks. 1 mL 0       Reviewed all current medications including OTC, herbals and supplements.     Labs:   Lab Results   Component Value Date    HEPBSAG Non-reactive 07/28/2023    HEPBCAB Non-reactive 07/28/2023     Lab Results   Component Value Date    TBGOLDPLUS Negative 07/28/2023     Lab Results   Component Value Date    UKPIOTUO94CX 21 (L) 07/28/2023    ZVCABFKV55 477 07/28/2023     Lab Results   Component Value Date    WBC 9.96 09/07/2023    HGB 11.8 (L) 09/07/2023    HCT 38.4 09/07/2023    MCV 77 (L) 09/07/2023     09/07/2023     Lab Results   Component Value Date    CREATININE 0.84 09/07/2023    ALBUMIN 4.5 09/07/2023    BILITOT 0.3 09/07/2023    ALKPHOS 74 09/07/2023    AST 48 (H) 09/07/2023    ALT 59 (H) 09/07/2023         Assessment/Plan:  Mindi Tate is a 23 y.o. 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 Patient presents to clinic for a follow up visit. She transitioned her care from Dr. Gonzalez to Dr. Rodríguez in 12/2022 and has not been seen in clinic since her initial visit. Patient delivered a healthy baby in feb 2023, but no doses of stelara were delayed. Dose was optimized to stelara 90mg every 6 weeks after her trough levels came back low. Symptomatically patient is stable without any concerns today. Denies any missed doses of stelara. CT from 8/2023 was concerning for possible active inflammation. Patient is overdue for a colonoscopy/EGD so will get scheduled today. No recent stool estela to assess her disease since the last scope in 2021.     # Recommendations:  - continue stelara 90mg SC every 6 weeks   - stool kit handed today. Will notify via phone or portal when to turn in stool sample  - colonoscopy to be scheduled today; first non urgent slot available   - repeat CBC CMP today; abnormalities noted when last checked in the ER in 9/2023  - labs up to date: CBC CMP repeat 5/2023, TB and hep B repeat 7/2024  - F/U visit with Dr. Rodríguez after the scope   - pt verbalized understanding instructions     # Immunocompromised and IBD health maintenance:   Avoid NSAIDs.  Immunizations: recommended PCV20, shingrix, hep A. Pt deferred flu shot and covid today   Skin exam yearly. Establish through PCP at [facility]    PAP smears yearly - normal in 2/2023, repeat 2/2024  Vit D: low (21) in 7/2023. Continue vit D 5000 IU/d      Lisset Arana, PharmD  IBD Clinical Pharmacist

## 2023-11-28 ENCOUNTER — TELEPHONE (OUTPATIENT)
Dept: GASTROENTEROLOGY | Facility: CLINIC | Age: 23
End: 2023-11-28
Payer: MEDICAID

## 2023-11-28 NOTE — TELEPHONE ENCOUNTER
Called and spoke with pt  - reviewed labs  - advised to turn in stool sample this week  - pt confirms will be able to drop off on Thursday  - answered all questions      [FreeTextEntry1] : EXAM: US ABD TARGET DYN INIT LES \par \par \par PROCEDURE DATE: Nov 22 2019 \par \par \par \par INTERPRETATION: EXAMINATION: Ultrasound voiding cystourethrogram \par \par HISTORY: Solitary right kidney \par \par COMPARISON: Ultrasound dated 2019 \par \par TECHNIQUE: Using sterile technique an 8 Taiwanese catheter was inserted into \par the urinary bladder. Using ultrasound guidance 1 cc of Lumason Microbubbles \par were mixed with 500 cc of Saline, which was subsequently instilled into the \par bladder via gravity. 3 filling/voiding cycles were accomplished. \par \par FINDINGS: \par \par No hydronephrosis is visualized in the right kidney. \par \par The urinary bladder is normal in caliber, contour and distensibility. No \par ureterocele is identified. \par \par There was no vesicoureteral reflux with filling or voiding. \par \par The male urethra appeared unremarkable. \par \par IMPRESSION: \par \par Solitary right kidney without hydronephrosis. \par \par No evidence of vesicoureteral reflux or posterior urethral valves.

## 2023-12-22 ENCOUNTER — PATIENT MESSAGE (OUTPATIENT)
Dept: GASTROENTEROLOGY | Facility: CLINIC | Age: 23
End: 2023-12-22
Payer: MEDICAID

## 2023-12-22 DIAGNOSIS — K50.813 CROHN'S DISEASE OF BOTH SMALL AND LARGE INTESTINE WITH FISTULA: Primary | ICD-10-CM

## 2023-12-22 NOTE — TELEPHONE ENCOUNTER
Spoke with Mindi:  IBD meds:  - Dante LD: 12/19, ND: 1/30/24    - last couple days increased stooling and abdominal pain/discomfort  - 5-6 BM/d, 4-5 nocturnal BM, loose brown stool  - describes diffuse abdominal pain, rates 5-6 of 10, achy pain - during day feels it in bilat lower quadrants, during night feels it in bilat upper quadrants  - defecation reduces discomfort but only temporarily  - tried an enema once when she felt she wasn't fully emptied, no further results  - denies blood, mucous, fever, recent antibiotic use  - appetite remains normal, drinking water  - reports she took in stool sample on 12/15/23 but not showing as received in chart  - she just moved but thinks she has a stool kit     Dr. Rodríguez will be updated for recommendation.

## 2023-12-22 NOTE — TELEPHONE ENCOUNTER
Per Dr. Rodríguez:  - CBC, CMP, CRP, Iron studies, stool calprotectin and stool h.pyolori antigen  - advised to stay hydrated and if symptoms worsen over holiday weekend she should seek urgent or emergent medical attention if she feels necessary  - states she does not have a stool kit and is advised to pick one up ASAP  - reviewed importance of stool sample to determine best plan to help her  - states understanding and agrees with this plan

## 2023-12-26 ENCOUNTER — TELEPHONE (OUTPATIENT)
Dept: GASTROENTEROLOGY | Facility: CLINIC | Age: 23
End: 2023-12-26
Payer: MEDICAID

## 2023-12-26 NOTE — TELEPHONE ENCOUNTER
LM on VM to return call d/t no show for lab. Pt received warning letter on 7/10/23. EGD/Colonoscopy scheduled 3/18/24. Pt has history of non-compliance with appointments. Will schedule OV w Dr. Rodríguez after scope.    Lab was to evaluate symptoms she reported on 12/22/23.

## 2024-01-18 PROBLEM — K56.7 ILEUS: Status: ACTIVE | Noted: 2024-01-18

## 2024-01-18 PROBLEM — R19.7 DIARRHEA: Status: ACTIVE | Noted: 2024-01-18

## 2024-01-18 PROBLEM — R11.2 NAUSEA & VOMITING: Status: ACTIVE | Noted: 2019-11-25

## 2024-01-18 PROBLEM — R78.81 GRAM-POSITIVE COCCI BACTEREMIA: Status: RESOLVED | Noted: 2024-01-18 | Resolved: 2024-01-18

## 2024-01-18 PROBLEM — K52.9 ENTERITIS: Status: ACTIVE | Noted: 2024-01-18

## 2024-01-18 PROBLEM — R10.9 ABDOMINAL PAIN: Status: ACTIVE | Noted: 2024-01-18

## 2024-01-18 PROBLEM — K92.0 HEMATEMESIS: Status: ACTIVE | Noted: 2024-01-18

## 2024-01-18 PROBLEM — R78.81 GRAM-POSITIVE COCCI BACTEREMIA: Status: ACTIVE | Noted: 2024-01-18

## 2024-01-19 PROBLEM — A49.8 CLOSTRIDIUM DIFFICILE INFECTION: Status: ACTIVE | Noted: 2024-01-19

## 2024-03-01 DIAGNOSIS — K50.813 CROHN'S DISEASE OF BOTH SMALL AND LARGE INTESTINE WITH FISTULA: ICD-10-CM

## 2024-03-01 NOTE — TELEPHONE ENCOUNTER
"IBD medications Stelara 90 mg Q 6 weeks    Refill request for Stelara 90 mg Q 6 weeks    Allergies reviewed Yes    Drug Monitoring labs/frequency for all IBD meds:    CBC / CMP Q 6 months  TB Quantiferon yearly  HBsAg / HBcAb yearly    Lab Results   Component Value Date    HEPBSAG Non-reactive 07/28/2023    HEPBCAB Non-reactive 07/28/2023     Lab Results   Component Value Date    TBGOLDPLUS Negative 07/28/2023     No results found for: "QUANTNILVALU", "QUANTIFERON", "QUANTTBGDPL"   Lab Results   Component Value Date    IURQIRRQ19SY 21 (L) 07/28/2023    NCKOSERF91 460 01/18/2024     Lab Results   Component Value Date    WBC 9.47 01/19/2024    HGB 11.7 (L) 01/19/2024    HCT 36.6 (L) 01/19/2024    MCV 77 (L) 01/19/2024     01/19/2024     Lab Results   Component Value Date    CREATININE 0.77 01/19/2024    ALBUMIN 4.4 01/19/2024    BILITOT 0.6 01/19/2024    ALKPHOS 66 01/19/2024    AST 34 01/19/2024    ALT 38 (H) 01/19/2024       Lab due date (mo/yr):  7/2024    Labs scheduled: no     Next appt: 3/18/2024 colonoscopy    RX refill sent to provider for amount until next labs: Yes     "
no assistive device

## 2024-03-03 RX ORDER — USTEKINUMAB 90 MG/ML
90 INJECTION, SOLUTION SUBCUTANEOUS
Qty: 1 ML | Refills: 1 | OUTPATIENT
Start: 2024-03-03

## 2024-03-10 ENCOUNTER — PATIENT MESSAGE (OUTPATIENT)
Dept: GASTROENTEROLOGY | Facility: CLINIC | Age: 24
End: 2024-03-10
Payer: MEDICAID

## 2024-03-11 DIAGNOSIS — K50.813 CROHN'S DISEASE OF BOTH SMALL AND LARGE INTESTINE WITH FISTULA: ICD-10-CM

## 2024-03-11 NOTE — TELEPHONE ENCOUNTER
Allergies reviewed, Rx pended for approval    Labs UTD: Yes    Next labs due: CBC/CMP (07/2024), Hep B (07/2024), TB (07/2024)    OV: will be determined after scope scheduled 3/18/24    1 Refill approved by Dr. Rodríguez

## 2024-03-11 NOTE — TELEPHONE ENCOUNTER
----- Message from Jeannine Rogers PharmD sent at 3/11/2024  4:09 PM CDT -----  Regarding: Stelara was printed, not sent eletronically  Hello! Pt called in for Stelara refill but the order was sent to print status and pt confirmed no physical rx given. Can you please re-send electronically to [facility]?    Thank you!  -Jeannine GIBSON

## 2024-03-12 ENCOUNTER — TELEPHONE (OUTPATIENT)
Dept: ENDOSCOPY | Facility: HOSPITAL | Age: 24
End: 2024-03-12
Payer: MEDICAID

## 2024-03-12 DIAGNOSIS — Z12.11 SPECIAL SCREENING FOR MALIGNANT NEOPLASMS, COLON: Primary | ICD-10-CM

## 2024-03-18 ENCOUNTER — ANESTHESIA (OUTPATIENT)
Dept: ENDOSCOPY | Facility: HOSPITAL | Age: 24
End: 2024-03-18
Payer: MEDICAID

## 2024-03-18 ENCOUNTER — HOSPITAL ENCOUNTER (OUTPATIENT)
Facility: HOSPITAL | Age: 24
Discharge: HOME | End: 2024-03-18
Attending: INTERNAL MEDICINE | Admitting: INTERNAL MEDICINE
Payer: MEDICAID

## 2024-03-18 ENCOUNTER — ANESTHESIA EVENT (OUTPATIENT)
Dept: ENDOSCOPY | Facility: HOSPITAL | Age: 24
End: 2024-03-18
Payer: MEDICAID

## 2024-03-18 ENCOUNTER — TELEPHONE (OUTPATIENT)
Dept: ENDOSCOPY | Facility: HOSPITAL | Age: 24
End: 2024-03-18
Payer: MEDICAID

## 2024-03-18 VITALS
SYSTOLIC BLOOD PRESSURE: 115 MMHG | BODY MASS INDEX: 35.79 KG/M2 | TEMPERATURE: 97 F | OXYGEN SATURATION: 97 % | HEIGHT: 70 IN | WEIGHT: 250 LBS | DIASTOLIC BLOOD PRESSURE: 70 MMHG | HEART RATE: 75 BPM | RESPIRATION RATE: 17 BRPM

## 2024-03-18 DIAGNOSIS — K29.70 HELICOBACTER PYLORI GASTRITIS: ICD-10-CM

## 2024-03-18 DIAGNOSIS — K50.819 CROHN'S DISEASE OF SMALL AND LARGE INTESTINES WITH COMPLICATION: Primary | ICD-10-CM

## 2024-03-18 DIAGNOSIS — K50.90 CROHN DISEASE: ICD-10-CM

## 2024-03-18 DIAGNOSIS — K50.813 CROHN'S DISEASE OF BOTH SMALL AND LARGE INTESTINE WITH FISTULA: Primary | ICD-10-CM

## 2024-03-18 DIAGNOSIS — B96.81 HELICOBACTER PYLORI GASTRITIS: ICD-10-CM

## 2024-03-18 LAB
B-HCG UR QL: NEGATIVE
CTP QC/QA: YES

## 2024-03-18 PROCEDURE — 25000003 PHARM REV CODE 250: Performed by: NURSE ANESTHETIST, CERTIFIED REGISTERED

## 2024-03-18 PROCEDURE — 37000009 HC ANESTHESIA EA ADD 15 MINS: Performed by: INTERNAL MEDICINE

## 2024-03-18 PROCEDURE — 37000008 HC ANESTHESIA 1ST 15 MINUTES: Performed by: INTERNAL MEDICINE

## 2024-03-18 PROCEDURE — 81025 URINE PREGNANCY TEST: CPT | Performed by: INTERNAL MEDICINE

## 2024-03-18 PROCEDURE — 43239 EGD BIOPSY SINGLE/MULTIPLE: CPT | Performed by: INTERNAL MEDICINE

## 2024-03-18 PROCEDURE — 88305 TISSUE EXAM BY PATHOLOGIST: CPT | Mod: 26,,, | Performed by: STUDENT IN AN ORGANIZED HEALTH CARE EDUCATION/TRAINING PROGRAM

## 2024-03-18 PROCEDURE — 63600175 PHARM REV CODE 636 W HCPCS: Performed by: NURSE ANESTHETIST, CERTIFIED REGISTERED

## 2024-03-18 PROCEDURE — 43239 EGD BIOPSY SINGLE/MULTIPLE: CPT | Mod: ,,, | Performed by: INTERNAL MEDICINE

## 2024-03-18 PROCEDURE — E9220 PRA ENDO ANESTHESIA: HCPCS | Mod: ,,, | Performed by: NURSE ANESTHETIST, CERTIFIED REGISTERED

## 2024-03-18 PROCEDURE — 88305 TISSUE EXAM BY PATHOLOGIST: CPT | Performed by: STUDENT IN AN ORGANIZED HEALTH CARE EDUCATION/TRAINING PROGRAM

## 2024-03-18 RX ORDER — PROPOFOL 10 MG/ML
VIAL (ML) INTRAVENOUS CONTINUOUS PRN
Status: DISCONTINUED | OUTPATIENT
Start: 2024-03-18 | End: 2024-03-18

## 2024-03-18 RX ORDER — SODIUM CHLORIDE 9 MG/ML
INJECTION, SOLUTION INTRAVENOUS CONTINUOUS
Status: DISCONTINUED | OUTPATIENT
Start: 2024-03-18 | End: 2024-03-18 | Stop reason: HOSPADM

## 2024-03-18 RX ORDER — LIDOCAINE HYDROCHLORIDE 20 MG/ML
INJECTION INTRAVENOUS
Status: DISCONTINUED | OUTPATIENT
Start: 2024-03-18 | End: 2024-03-18

## 2024-03-18 RX ORDER — PROPOFOL 10 MG/ML
VIAL (ML) INTRAVENOUS
Status: DISCONTINUED | OUTPATIENT
Start: 2024-03-18 | End: 2024-03-18

## 2024-03-18 RX ADMIN — SODIUM CHLORIDE: 0.9 INJECTION, SOLUTION INTRAVENOUS at 08:03

## 2024-03-18 RX ADMIN — PROPOFOL 175 MCG/KG/MIN: 10 INJECTION, EMULSION INTRAVENOUS at 08:03

## 2024-03-18 RX ADMIN — PROPOFOL 80 MG: 10 INJECTION, EMULSION INTRAVENOUS at 08:03

## 2024-03-18 RX ADMIN — LIDOCAINE HYDROCHLORIDE 100 MG: 20 INJECTION INTRAVENOUS at 08:03

## 2024-03-18 NOTE — TRANSFER OF CARE
"Anesthesia Transfer of Care Note    Patient: Mindi Tate    Procedure(s) Performed: Procedure(s) (LRB):  EGD (ESOPHAGOGASTRODUODENOSCOPY) (N/A)  COLONOSCOPY (N/A)    Patient location: PACU    Anesthesia Type: general    Transport from OR: Transported from OR on room air with adequate spontaneous ventilation    Post pain: adequate analgesia    Post assessment: no apparent anesthetic complications and tolerated procedure well    Post vital signs: stable    Level of consciousness: awake, alert and oriented    Nausea/Vomiting: no nausea/vomiting    Complications: none    Transfer of care protocol was followed      Last vitals: Visit Vitals  BP (!) 123/58 (BP Location: Left arm, Patient Position: Lying)   Pulse 71   Temp 36.8 °C (98.2 °F) (Temporal)   Resp 16   Ht 5' 10" (1.778 m)   Wt 113.4 kg (250 lb)   SpO2 99%   Breastfeeding No   BMI 35.87 kg/m²     "

## 2024-03-18 NOTE — ANESTHESIA POSTPROCEDURE EVALUATION
Anesthesia Post Evaluation    Patient: Mindi Tate    Procedure(s) Performed: Procedure(s) (LRB):  EGD (ESOPHAGOGASTRODUODENOSCOPY) (N/A)  COLONOSCOPY (N/A)    Final Anesthesia Type: general      Patient location during evaluation: PACU  Patient participation: Yes- Able to Participate  Level of consciousness: awake and alert  Post-procedure vital signs: reviewed and stable  Pain management: adequate  Airway patency: patent    PONV status at discharge: No PONV  Anesthetic complications: no      Cardiovascular status: blood pressure returned to baseline  Respiratory status: unassisted  Hydration status: euvolemic  Follow-up not needed.              Vitals Value Taken Time   /70 03/18/24 0956   Temp 36.3 °C (97.3 °F) 03/18/24 0926   Pulse 75 03/18/24 0956   Resp 17 03/18/24 0956   SpO2 97 % 03/18/24 0956         Event Time   Out of Recovery 10:07:36         Pain/Dionne Score: Dionne Score: 10 (3/18/2024  9:41 AM)

## 2024-03-18 NOTE — PROVATION PATIENT INSTRUCTIONS
Discharge Summary/Instructions after an Endoscopic Procedure  Patient Name: Mindi Tate  Patient MRN: [id number]  Patient YOB: 2000 Monday, March 18, 2024  Derek Rodríguez MD  Dear patient,  As a result of recent federal legislation (The Federal Cures Act), you may   receive lab or pathology results from your procedure in your [facility]   account before your physician is able to contact you. Your physician or   their representative will relay the results to you with their   recommendations at their soonest availability.  Thank you,  RESTRICTIONS:  During your procedure today, you received medications for sedation.  These   medications may affect your judgment, balance and coordination.  Therefore,   for 24 hours, you have the following restrictions:   - DO NOT drive a car, operate machinery, make legal/financial decisions,   sign important papers or drink alcohol.    ACTIVITY:  Today: no heavy lifting, straining or running due to procedural   sedation/anesthesia.  The following day: return to full activity including work.  DIET:  Eat and drink normally unless instructed otherwise.     TREATMENT FOR COMMON SIDE EFFECTS:  - Mild abdominal pain, nausea, belching, bloating or excessive gas:  rest,   eat lightly and use a heating pad.  - Sore Throat: treat with throat lozenges and/or gargle with warm salt   water.  - Because air was used during the procedure, expelling large amounts of air   from your rectum or belching is normal.  - If a bowel prep was taken, you may not have a bowel movement for 1-3 days.    This is normal.  SYMPTOMS TO WATCH FOR AND REPORT TO YOUR PHYSICIAN:  1. Abdominal pain or bloating, other than gas cramps.  2. Chest pain.  3. Back pain.  4. Signs of infection such as: chills or fever occurring within 24 hours   after the procedure.  5. Rectal bleeding, which would show as bright red, maroon, or black stools.   (A tablespoon of blood from the rectum is not serious, especially if    hemorrhoids are present.)  6. Vomiting.  7. Weakness or dizziness.  GO DIRECTLY TO THE NEAREST EMERGENCY ROOM IF YOU HAVE ANY OF THE FOLLOWING:      Difficulty breathing              Chills and/or fever over 101 F   Persistent vomiting and/or vomiting blood   Severe abdominal pain   Severe chest pain   Black, tarry stools   Bleeding- more than one tablespoon   Any other symptom or condition that you feel may need urgent attention  Your doctor recommends these additional instructions:  If any biopsies were taken, your doctors clinic will contact you in 1 to 2   weeks with any results.  - Discharge patient to home.   - Patient has a contact number available for emergencies.  The signs and   symptoms of potential delayed complications were discussed with the   patient.  Return to normal activities tomorrow.  Written discharge   instructions were provided to the patient.   - Resume previous diet.   - Continue present medications.   - Await pathology results.   For questions, problems or results please call your physician - Derek Rodríguez MD at Work:  [phone].  [facility], EMERGENCY ROOM PHONE NUMBER: [phone]  IF A COMPLICATION OR EMERGENCY SITUATION ARISES AND YOU ARE UNABLE TO REACH   YOUR PHYSICIAN - GO DIRECTLY TO THE EMERGENCY ROOM.  Derek Rodríguez MD  3/18/2024 9:24:09 AM  This report has been verified and signed electronically.  Dear patient,  As a result of recent federal legislation (The Federal Cures Act), you may   receive lab or pathology results from your procedure in your [facility]   account before your physician is able to contact you. Your physician or   their representative will relay the results to you with their   recommendations at their soonest availability.  Thank you,  PROVATION

## 2024-03-18 NOTE — ANESTHESIA PREPROCEDURE EVALUATION
2024  Mindi Tate is a 23 y.o., female.    Pre-operative evaluation for Procedure(s) (LRB):  EGD (ESOPHAGOGASTRODUODENOSCOPY) (N/A)  COLONOSCOPY (N/A)    Mindi Tate is a 23 y.o. female presently without significant cardiopulmonary issues. GI stable    LDA:     Prev airway:     Drips:     Patient Active Problem List   Diagnosis    Amniotic fluid leaking    S/P  section    Class 2 obesity in adult    Nausea & vomiting    Intravascular volume depletion    Family dynamics problem    Inflammatory bowel disease    Chronic anemia    ACP (advance care planning)    Crohn's disease ileum/colon (h/o gluteal cleft abscess 2019, transverse, ascending, cecum with extensive pseudoplyps, ascending colon stricture, ileum and fistula from TI to cecum)      Helicobacter pylori gastritis    Recurrent pancreatitis    Pancreas cyst    NAFLD (nonalcoholic fatty liver disease)    Immunosuppressed status    Hematemesis    Abdominal pain    Ileus    Diarrhea    Enteritis    Clostridium difficile infection       Review of patient's allergies indicates:   Allergen Reactions    Aspirin Hives    Ondansetron Nausea And Vomiting        No current facility-administered medications on file prior to encounter.     Current Outpatient Medications on File Prior to Encounter   Medication Sig Dispense Refill    busPIRone (BUSPAR) 10 MG tablet Take 10 mg by mouth once daily.      cholecalciferol, vitamin D3, 125 mcg (5,000 unit) Tab Take 1,000 Units by mouth once daily.      cyanocobalamin (VITAMIN B-12) 100 MCG tablet Take 100 mcg by mouth once daily.      LIDOcaine (LIDODERM) 5 % Place 1 patch onto the skin every 24 hours. Remove & Discard patch within 12 hours. 12 hours on 12 hours off      lisdexamfetamine (VYVANSE) 40 MG Cap Take 40 mg by mouth once daily.         Past Surgical History:    Procedure Laterality Date    ADENOIDECTOMY       SECTION Left 2019    Procedure:  SECTION;  Surgeon: Robbie Jaramillo MD;  Location: [facility] L&D;  Service: OB/GYN;  Laterality: Left;     SECTION       SECTION WITH TUBAL LIGATION N/A 2023    Procedure:  SECTION, WITH TUBAL LIGATION;  Surgeon: Robbie Jaramillo MD;  Location: [facility] L&D;  Service: OB/GYN;  Laterality: N/A;    CHOLECYSTECTOMY      COLONOSCOPY N/A 10/15/2020    Procedure: COLONOSCOPY;  Surgeon: Olegario Lozano MD;  Location: [facility] ENDO;  Service: Endoscopy;  Laterality: N/A;    COLONOSCOPY N/A 10/7/2021    Procedure: COLONOSCOPY- unable to reach cecum -poor prep;  Surgeon: Dre Hubbard MD;  Location: [facility] ENDO;  Service: Endoscopy;  Laterality: N/A;    COLONOSCOPY N/A 10/12/2021    Procedure: COLONOSCOPY- no cecum -poor prep;  Surgeon: Oleagrio Lozano MD;  Location: [facility] ENDO;  Service: Endoscopy;  Laterality: N/A;    COLONOSCOPY N/A 2023    Procedure: COLONOSCOPY;  Surgeon: Derek Rodríguez MD;  Location: [facility] (4TH FLR);  Service: Endoscopy;  Laterality: N/A;  23 Darlin, Diet per JERRY Rodríguez- meli referral dated 12/15/22, pt states she has unopened Golytely,  instr via portal -PC  -r/s-inst to portal    ESOPHAGOGASTRODUODENOSCOPY N/A 2021    Procedure: EGD (ESOPHAGOGASTRODUODENOSCOPY);  Surgeon: Emanuel Jackson MD;  Location: [facility];  Service: Endoscopy;  Laterality: N/A;    ESOPHAGOGASTRODUODENOSCOPY N/A 2023    Procedure: EGD (ESOPHAGOGASTRODUODENOSCOPY);  Surgeon: Derek Rodríguez MD;  Location: [facility] (4TH FLR);  Service: Endoscopy;  Laterality: N/A;  Low residue diet week before  Miralax 1 capful daily 5 days prior to the scope  Golytely  2023  instructions to [place]  23- preop call done- [facility]    LAPAROSCOPIC CHOLECYSTECTOMY N/A 2019    Procedure: CHOLECYSTECTOMY, LAPAROSCOPIC;  Surgeon: Jewel Cutler MD;  Location: [facility] OR;   "Service: General;  Laterality: N/A;    TONSILLECTOMY  2011    UPPER GASTROINTESTINAL ENDOSCOPY         Social History     Socioeconomic History    Marital status: Single   Tobacco Use    Smoking status: Never    Smokeless tobacco: Never   Substance and Sexual Activity    Alcohol use: Not Currently     Comment: socially    Drug use: No    Sexual activity: Not Currently     Partners: Male     Social Determinants of Health     Food Insecurity: No Food Insecurity (2024)    Hunger Vital Sign     Worried About Running Out of Food in the Last Year: Never true     Ran Out of Food in the Last Year: Never true   Transportation Needs: Unmet Transportation Needs (2024)    PRAPARE - Transportation     Lack of Transportation (Medical): Yes     Lack of Transportation (Non-Medical): Yes   Housing Stability: Low Risk  (2024)    Housing Stability Vital Sign     Unable to Pay for Housing in the Last Year: No     Number of Places Lived in the Last Year: 2     Unstable Housing in the Last Year: No         Vital Signs Range (Last 24H):         CBC: No results for input(s): "WBC", "RBC", "HGB", "HCT", "PLT", "MCV", "MCH", "MCHC" in the last 72 hours.    CMP: No results for input(s): "NA", "K", "CL", "CO2", "BUN", "CREATININE", "GLU", "MG", "PHOS", "CALCIUM", "ALBUMIN", "PROT", "ALKPHOS", "ALT", "AST", "BILITOT" in the last 72 hours.    INR  No results for input(s): "PT", "INR", "PROTIME", "APTT" in the last 72 hours.        Diagnostic Studies:      EKD Echo:        Pre-op Assessment    I have reviewed the Patient Summary Reports.     I have reviewed the Nursing Notes.    I have reviewed the Medications.     Review of Systems  Anesthesia Hx:  No problems with previous Anesthesia                Social:  Non-Smoker       Hematology/Oncology:  Hematology Normal   Oncology Normal                                   EENT/Dental:  EENT/Dental Normal           Cardiovascular:  Cardiovascular Normal             "                                Pulmonary:  Pulmonary Normal                       Musculoskeletal:  Musculoskeletal Normal                Neurological:  Neurology Normal                                      Endocrine:  Endocrine Normal            Dermatological:  Skin Normal        Physical Exam    Airway:  Mallampati: I   Mouth Opening: Normal  Tongue: Normal  Neck ROM: Normal ROM    Dental:  Intact    Chest/Lungs:  Clear to auscultation      Anesthesia Plan  Type of Anesthesia, risks & benefits discussed:    Anesthesia Type: Gen Natural Airway  Intra-op Monitoring Plan: Standard ASA Monitors  Post Op Pain Control Plan: multimodal analgesia  Induction:  Inhalation  Informed Consent: Informed consent signed with the Patient representative and all parties understand the risks and agree with anesthesia plan.  All questions answered.   ASA Score: 3    Ready For Surgery From Anesthesia Perspective.     .

## 2024-03-18 NOTE — TELEPHONE ENCOUNTER
Spoke to pt to reschedule procedure(s) Colonoscopy       Physician to perform procedure(s) Dr. JERRY Rodríguez  Date of Procedure (s) 04/02/24  Arrival Time 11:45 AM  Time of Procedure(s) 12:45 PM   Location of Procedure(s) [facility] Floor  Type of Rx Prep sent to patient: Miralax  Instructions provided to patient via [facility]    Patient was informed on the following information and verbalized understanding. Screening questionnaire reviewed with patient and complete. If procedure requires anesthesia, a responsible adult needs to be present to accompany the patient home, patient cannot drive after receiving anesthesia. Appointment details are tentative, especially check-in time. Patient will receive a prep-op call 7 days prior to confirm check-in time for procedure. If applicable the patient should contact their pharmacy to verify Rx for procedure prep is ready for pick-up. Patient was advised to call the scheduling department at [phone] if pharmacy states no Rx is available. Patient was advised to call the endoscopy scheduling department if any questions or concerns arise.      [facility] Scheduling Department

## 2024-03-18 NOTE — H&P
Short Stay Endoscopy History and Physical    PCP - [facility]  Referring Physician - Derek Rodríguez MD  [address] [place] [address]    Procedure - EGD and Colonoscopy  ASA - per anesthesia  Mallampati - per anesthesia  History of Anesthesia problems - no  Family history Anesthesia problems -  no   Plan of anesthesia - General    HPI  23 y.o. female    Reason for procedure:   H pylori gastritis, Crohn's disease     ROS:  Constitutional: No fevers, chills, No weight loss  CV: No chest pain  Pulm: No cough, No shortness of breath  GI: see HPI    Medical History:  has a past medical history of ADHD (attention deficit hyperactivity disorder), Crohn's disease ileum/colon (h/o gluteal cleft abscess 2019, transverse, ascending, cecum with extensive pseudoplyps, ascending colon stricture, ileum and fistula from TI to cecum)   (2022), H/O seasonal allergies, Helicobacter pylori gastritis (2022), Immunosuppressed status (2022), NAFLD (nonalcoholic fatty liver disease), Pancreas cyst (2022), and Recurrent pancreatitis (2022).    Surgical History:  has a past surgical history that includes Tonsillectomy (); Adenoidectomy;  section (Left, 2019);  section; Laparoscopic cholecystectomy (N/A, 2019); Colonoscopy (N/A, 10/15/2020); Esophagogastroduodenoscopy (N/A, 2021); Colonoscopy (N/A, 10/7/2021); Colonoscopy (N/A, 10/12/2021); Upper gastrointestinal endoscopy; Cholecystectomy;  section with tubal ligation (N/A, 2023); Esophagogastroduodenoscopy (N/A, 2023); and Colonoscopy (N/A, 2023).    Family History: family history includes Asthma in her sister; Breast cancer in her paternal aunt; Crohn's disease in her brother; Multiple sclerosis in her father; Seizures in her father.    Social History:  reports that she has never smoked. She has never used smokeless tobacco. She reports that she does not currently use  alcohol. She reports that she does not use drugs.    Review of patient's allergies indicates:   Allergen Reactions    Aspirin Hives    Ondansetron Nausea And Vomiting       Medications:   Medications Prior to Admission   Medication Sig Dispense Refill Last Dose    busPIRone (BUSPAR) 10 MG tablet Take 10 mg by mouth once daily.   Past Month    cholecalciferol, vitamin D3, 125 mcg (5,000 unit) Tab Take 1,000 Units by mouth once daily.   Past Week    cyanocobalamin (VITAMIN B-12) 100 MCG tablet Take 100 mcg by mouth once daily.   Past Week    loratadine (CLARITIN) 10 mg tablet Take 10 mg by mouth once daily.   Past Week    ustekinumab (STELARA) 90 mg/mL Syrg syringe Inject 1 mL (90 mg total) into the skin every 6 weeks. 1 mL 0 Past Week    ZINC ORAL Take 1 tablet by mouth once daily.   Past Month    acetaminophen (TYLENOL EXTRA STRENGTH) 500 MG tablet Take 1,000 mg by mouth 2 (two) times daily as needed for Pain.       calcium carbonate (TUMS) 200 mg calcium (500 mg) chewable tablet Take 2 tablets by mouth daily as needed for Heartburn.   More than a month    cetirizine (ZYRTEC) 10 MG tablet Take 10 mg by mouth once daily.   More than a month    fluticasone propionate (FLONASE ALLERGY RELIEF) 50 mcg/actuation nasal spray 2 sprays by Each Nostril route daily as needed for Rhinitis.       LIDOcaine (LIDODERM) 5 % Place 1 patch onto the skin every 24 hours. Remove & Discard patch within 12 hours. 12 hours on 12 hours off       lisdexamfetamine (VYVANSE) 40 MG Cap Take 40 mg by mouth once daily.   More than a month    vancomycin (VANCOCIN) 125 MG capsule Take 1 capsule (125 mg total) by mouth 4 (four) times daily. 32 capsule 0 More than a month       Physical Exam:    Vital Signs:   Vitals:    03/18/24 0747   BP: (!) 123/58   Pulse: 71   Resp: 16   Temp: 98.2 °F (36.8 °C)       General Appearance: Well appearing in no acute distress  Abdomen: Soft, non tender, non distended with normal bowel sounds, no masses    Labs:  Lab  Results   Component Value Date    WBC 9.47 01/19/2024    HGB 11.7 (L) 01/19/2024    HCT 36.6 (L) 01/19/2024     01/19/2024    CHOL 171 07/28/2023    TRIG 116 07/28/2023    HDL 49 07/28/2023    ALT 38 (H) 01/19/2024    AST 34 01/19/2024     (L) 01/19/2024    K 3.8 01/19/2024     01/19/2024    CREATININE 0.77 01/19/2024    BUN 13 01/19/2024    CO2 23 01/19/2024    TSH 0.792 07/28/2023    INR 1.0 09/07/2023    HGBA1C 5.5 05/04/2021       I have explained the risks and benefits of this endoscopic procedure to the patient including but not limited to bleeding, inflammation, infection, perforation, and death.      Derek Rodríguez MD

## 2024-03-20 LAB
FINAL PATHOLOGIC DIAGNOSIS: NORMAL
GROSS: NORMAL
Lab: NORMAL
MICROSCOPIC EXAM: NORMAL

## 2024-03-26 ENCOUNTER — TELEPHONE (OUTPATIENT)
Dept: ENDOSCOPY | Facility: HOSPITAL | Age: 24
End: 2024-03-26
Payer: MEDICAID

## 2024-04-02 ENCOUNTER — ANESTHESIA (OUTPATIENT)
Dept: ENDOSCOPY | Facility: HOSPITAL | Age: 24
End: 2024-04-02
Payer: MEDICAID

## 2024-04-02 ENCOUNTER — HOSPITAL ENCOUNTER (OUTPATIENT)
Facility: HOSPITAL | Age: 24
Discharge: HOME | End: 2024-04-02
Attending: INTERNAL MEDICINE | Admitting: INTERNAL MEDICINE
Payer: MEDICAID

## 2024-04-02 ENCOUNTER — ANESTHESIA EVENT (OUTPATIENT)
Dept: ENDOSCOPY | Facility: HOSPITAL | Age: 24
End: 2024-04-02
Payer: MEDICAID

## 2024-04-02 VITALS
HEART RATE: 72 BPM | DIASTOLIC BLOOD PRESSURE: 78 MMHG | TEMPERATURE: 98 F | OXYGEN SATURATION: 99 % | BODY MASS INDEX: 35.79 KG/M2 | WEIGHT: 250 LBS | SYSTOLIC BLOOD PRESSURE: 128 MMHG | RESPIRATION RATE: 16 BRPM | HEIGHT: 70 IN

## 2024-04-02 DIAGNOSIS — K50.813 CROHN'S DISEASE OF BOTH SMALL AND LARGE INTESTINE WITH FISTULA: Primary | ICD-10-CM

## 2024-04-02 DIAGNOSIS — K50.90 CROHN DISEASE: ICD-10-CM

## 2024-04-02 LAB
B-HCG UR QL: NEGATIVE
CTP QC/QA: YES

## 2024-04-02 PROCEDURE — 25000003 PHARM REV CODE 250: Performed by: NURSE ANESTHETIST, CERTIFIED REGISTERED

## 2024-04-02 PROCEDURE — 88305 TISSUE EXAM BY PATHOLOGIST: CPT | Performed by: PATHOLOGY

## 2024-04-02 PROCEDURE — 37000009 HC ANESTHESIA EA ADD 15 MINS: Performed by: INTERNAL MEDICINE

## 2024-04-02 PROCEDURE — 63600175 PHARM REV CODE 636 W HCPCS: Performed by: NURSE ANESTHETIST, CERTIFIED REGISTERED

## 2024-04-02 PROCEDURE — 25000003 PHARM REV CODE 250: Performed by: INTERNAL MEDICINE

## 2024-04-02 PROCEDURE — 88305 TISSUE EXAM BY PATHOLOGIST: CPT | Mod: 26,,, | Performed by: PATHOLOGY

## 2024-04-02 PROCEDURE — 81025 URINE PREGNANCY TEST: CPT | Performed by: INTERNAL MEDICINE

## 2024-04-02 PROCEDURE — 37000008 HC ANESTHESIA 1ST 15 MINUTES: Performed by: INTERNAL MEDICINE

## 2024-04-02 PROCEDURE — 45380 COLONOSCOPY AND BIOPSY: CPT | Mod: 74 | Performed by: INTERNAL MEDICINE

## 2024-04-02 PROCEDURE — 27201012 HC FORCEPS, HOT/COLD, DISP: Performed by: INTERNAL MEDICINE

## 2024-04-02 PROCEDURE — E9220 PRA ENDO ANESTHESIA: HCPCS | Mod: ,,, | Performed by: NURSE ANESTHETIST, CERTIFIED REGISTERED

## 2024-04-02 PROCEDURE — 45380 COLONOSCOPY AND BIOPSY: CPT | Mod: 52,,, | Performed by: INTERNAL MEDICINE

## 2024-04-02 RX ORDER — SODIUM CHLORIDE 9 MG/ML
INJECTION, SOLUTION INTRAVENOUS CONTINUOUS
Status: DISCONTINUED | OUTPATIENT
Start: 2024-04-02 | End: 2024-04-02 | Stop reason: HOSPADM

## 2024-04-02 RX ORDER — LIDOCAINE HYDROCHLORIDE 20 MG/ML
INJECTION, SOLUTION EPIDURAL; INFILTRATION; INTRACAUDAL; PERINEURAL
Status: DISCONTINUED | OUTPATIENT
Start: 2024-04-02 | End: 2024-04-02

## 2024-04-02 RX ORDER — PROPOFOL 10 MG/ML
VIAL (ML) INTRAVENOUS
Status: DISCONTINUED | OUTPATIENT
Start: 2024-04-02 | End: 2024-04-02

## 2024-04-02 RX ADMIN — PROPOFOL 80 MG: 10 INJECTION, EMULSION INTRAVENOUS at 12:04

## 2024-04-02 RX ADMIN — PROPOFOL 20 MG: 10 INJECTION, EMULSION INTRAVENOUS at 01:04

## 2024-04-02 RX ADMIN — LIDOCAINE HYDROCHLORIDE 50 MG: 20 INJECTION, SOLUTION EPIDURAL; INFILTRATION; INTRACAUDAL; PERINEURAL at 12:04

## 2024-04-02 RX ADMIN — SODIUM CHLORIDE: 0.9 INJECTION, SOLUTION INTRAVENOUS at 12:04

## 2024-04-02 NOTE — ANESTHESIA POSTPROCEDURE EVALUATION
Anesthesia Post Evaluation    Patient: Mindi Tate    Procedure(s) Performed: Procedure(s) (LRB):  COLONOSCOPY (N/A)    Final Anesthesia Type: general      Patient location during evaluation: PACU  Patient participation: Yes- Able to Participate  Level of consciousness: awake and alert  Post-procedure vital signs: reviewed and stable  Pain management: adequate  Airway patency: patent    PONV status at discharge: No PONV  Anesthetic complications: no      Cardiovascular status: blood pressure returned to baseline  Respiratory status: spontaneous ventilation and room air  Hydration status: euvolemic  Follow-up not needed.              Vitals Value Taken Time   /78 04/02/24 1409   Temp 36.6 °C (97.9 °F) 04/02/24 1340   Pulse 72 04/02/24 1409   Resp 16 04/02/24 1409   SpO2 99 % 04/02/24 1409         No case tracking events are documented in the log.      Pain/Dionne Score: Dionne Score: 9 (4/2/2024  1:40 PM)

## 2024-04-02 NOTE — ANESTHESIA PREPROCEDURE EVALUATION
2024  Mindi Tate is a 23 y.o., female.    Past Medical History:   Diagnosis Date    ADHD (attention deficit hyperactivity disorder)     Crohn's disease ileum/colon (h/o gluteal cleft abscess 2019, transverse, ascending, cecum with extensive pseudoplyps, ascending colon stricture, ileum and fistula from TI to cecum)   2022    H/O seasonal allergies     Helicobacter pylori gastritis 2022    Immunosuppressed status 2022    NAFLD (nonalcoholic fatty liver disease)     Pancreas cyst 2022    Recurrent pancreatitis 2022       Past Surgical History:   Procedure Laterality Date    ADENOIDECTOMY       SECTION Left 2019    Procedure:  SECTION;  Surgeon: Robbie Jaramillo MD;  Location: [facility] L&D;  Service: OB/GYN;  Laterality: Left;     SECTION       SECTION WITH TUBAL LIGATION N/A 2023    Procedure:  SECTION, WITH TUBAL LIGATION;  Surgeon: Robbie Jaramillo MD;  Location: [facility] L&D;  Service: OB/GYN;  Laterality: N/A;    CHOLECYSTECTOMY      COLONOSCOPY N/A 10/15/2020    Procedure: COLONOSCOPY;  Surgeon: Olegario Lozano MD;  Location: [facility];  Service: Endoscopy;  Laterality: N/A;    COLONOSCOPY N/A 10/7/2021    Procedure: COLONOSCOPY- unable to reach cecum -poor prep;  Surgeon: Dre Hubbard MD;  Location: [facility];  Service: Endoscopy;  Laterality: N/A;    COLONOSCOPY N/A 10/12/2021    Procedure: COLONOSCOPY- no cecum -poor prep;  Surgeon: Olegario Lozano MD;  Location: [facility];  Service: Endoscopy;  Laterality: N/A;    COLONOSCOPY N/A 2023    Procedure: COLONOSCOPY;  Surgeon: Derek Rodríguez MD;  Location: [facility] ([address] [place] [address]);  Service: Endoscopy;  Laterality: N/A;  23 Darlin, Diet per JERRY Rodríguez- meli referral dated 12/15/22, pt states she has unopened Golytely,  instr via portal -PC  -r/s-inst to portal     COLONOSCOPY N/A 3/18/2024    Procedure: COLONOSCOPY;  Surgeon: Derek Rodríguez MD;  Location: [facility] (4TH FLR);  Service: Endoscopy;  Laterality: N/A;    ESOPHAGOGASTRODUODENOSCOPY N/A 5/5/2021    Procedure: EGD (ESOPHAGOGASTRODUODENOSCOPY);  Surgeon: Emanuel Jackson MD;  Location: [facility];  Service: Endoscopy;  Laterality: N/A;    ESOPHAGOGASTRODUODENOSCOPY N/A 6/26/2023    Procedure: EGD (ESOPHAGOGASTRODUODENOSCOPY);  Surgeon: Derek Rodríguez MD;  Location: [facility] (4TH FLR);  Service: Endoscopy;  Laterality: N/A;  Low residue diet week before  Miralax 1 capful daily 5 days prior to the scope  Golytely  April 2023  instructions to portal  6-20-23- preop call done- [facility]    ESOPHAGOGASTRODUODENOSCOPY N/A 3/18/2024    Procedure: EGD (ESOPHAGOGASTRODUODENOSCOPY);  Surgeon: Derek Rodríguez MD;  Location: [facility] (4TH FLR);  Service: Endoscopy;  Laterality: N/A;  Peg prep extended -pt states no preps clean her out  prep instructions given to pt via clinic/sent to robert Rodríguez pt /per Lisset Garcia  3/12-precall complete-MS    LAPAROSCOPIC CHOLECYSTECTOMY N/A 11/29/2019    Procedure: CHOLECYSTECTOMY, LAPAROSCOPIC;  Surgeon: Jewel Cutler MD;  Location: [facility];  Service: General;  Laterality: N/A;    TONSILLECTOMY  2011    UPPER GASTROINTESTINAL ENDOSCOPY             Pre-op Assessment    I have reviewed the Patient Summary Reports.     I have reviewed the Nursing Notes. I have reviewed the NPO Status.   I have reviewed the Medications.     Review of Systems  Anesthesia Hx:  No problems with previous Anesthesia                Social:  Non-Smoker, No Alcohol Use       Hematology/Oncology:  Hematology Normal   Oncology Normal                                   EENT/Dental:  EENT/Dental Normal           Cardiovascular:  Cardiovascular Normal Exercise tolerance: good                                           Pulmonary:  Pulmonary Normal                       Renal/:  Renal/ Normal                  Hepatic/GI:      Liver Disease,            Musculoskeletal:  Musculoskeletal Normal                Neurological:  Neurology Normal                                      Endocrine:  Endocrine Normal            Dermatological:  Skin Normal    Psych:  Psychiatric History                  Physical Exam  General: Well nourished, Cooperative, Alert and Oriented    Airway:  Mallampati: III / II  Mouth Opening: Normal  TM Distance: 4 - 6 cm  Tongue: Normal  Neck ROM: Normal ROM    Dental:  Intact    Chest/Lungs:  Clear to auscultation, Normal Respiratory Rate    Heart:  Rate: Normal  Rhythm: Regular Rhythm        Anesthesia Plan  Type of Anesthesia, risks & benefits discussed:    Anesthesia Type: Gen Natural Airway  Intra-op Monitoring Plan: Standard ASA Monitors  Post Op Pain Control Plan: multimodal analgesia  Induction:  IV  Informed Consent: Informed consent signed with the Patient and all parties understand the risks and agree with anesthesia plan.  All questions answered. Patient consented to blood products? No  ASA Score: 2  Day of Surgery Review of History & Physical: H&P Update referred to the surgeon/provider.    Ready For Surgery From Anesthesia Perspective.     .

## 2024-04-02 NOTE — PROVATION PATIENT INSTRUCTIONS
Discharge Summary/Instructions after an Endoscopic Procedure  Patient Name: Mindi Tate  Patient MRN: [id number]  Patient YOB: 2000 Tuesday, April 2, 2024  Derek Rodríguez MD  Dear patient,  As a result of recent federal legislation (The Federal Cures Act), you may   receive lab or pathology results from your procedure in your [facility]   account before your physician is able to contact you. Your physician or   their representative will relay the results to you with their   recommendations at their soonest availability.  Thank you,  RESTRICTIONS:  During your procedure today, you received medications for sedation.  These   medications may affect your judgment, balance and coordination.  Therefore,   for 24 hours, you have the following restrictions:   - DO NOT drive a car, operate machinery, make legal/financial decisions,   sign important papers or drink alcohol.    ACTIVITY:  Today: no heavy lifting, straining or running due to procedural   sedation/anesthesia.  The following day: return to full activity including work.  DIET:  Eat and drink normally unless instructed otherwise.     TREATMENT FOR COMMON SIDE EFFECTS:  - Mild abdominal pain, nausea, belching, bloating or excessive gas:  rest,   eat lightly and use a heating pad.  - Sore Throat: treat with throat lozenges and/or gargle with warm salt   water.  - Because air was used during the procedure, expelling large amounts of air   from your rectum or belching is normal.  - If a bowel prep was taken, you may not have a bowel movement for 1-3 days.    This is normal.  SYMPTOMS TO WATCH FOR AND REPORT TO YOUR PHYSICIAN:  1. Abdominal pain or bloating, other than gas cramps.  2. Chest pain.  3. Back pain.  4. Signs of infection such as: chills or fever occurring within 24 hours   after the procedure.  5. Rectal bleeding, which would show as bright red, maroon, or black stools.   (A tablespoon of blood from the rectum is not serious, especially if    hemorrhoids are present.)  6. Vomiting.  7. Weakness or dizziness.  GO DIRECTLY TO THE NEAREST EMERGENCY ROOM IF YOU HAVE ANY OF THE FOLLOWING:      Difficulty breathing              Chills and/or fever over 101 F   Persistent vomiting and/or vomiting blood   Severe abdominal pain   Severe chest pain   Black, tarry stools   Bleeding- more than one tablespoon   Any other symptom or condition that you feel may need urgent attention  Your doctor recommends these additional instructions:  If any biopsies were taken, your doctors clinic will contact you in 1 to 2   weeks with any results.  - Discharge patient to home.   - Patient has a contact number available for emergencies.  The signs and   symptoms of potential delayed complications were discussed with the   patient.  Return to normal activities tomorrow.  Written discharge   instructions were provided to the patient.   - Resume previous diet.   - Continue present medications.   - Await pathology results.   - Repeat colonoscopy (date not yet determined).   - Return to GI clinic as previously scheduled.  For questions, problems or results please call your physician - Derek Rodríguez MD at Work:  [phone].  [facility], EMERGENCY ROOM PHONE NUMBER: [phone]  IF A COMPLICATION OR EMERGENCY SITUATION ARISES AND YOU ARE UNABLE TO REACH   YOUR PHYSICIAN - GO DIRECTLY TO THE EMERGENCY ROOM.  Derek Rodríguez MD  4/2/2024 1:44:47 PM  This report has been verified and signed electronically.  Dear patient,  As a result of recent federal legislation (The Federal Cures Act), you may   receive lab or pathology results from your procedure in your [facility]   account before your physician is able to contact you. Your physician or   their representative will relay the results to you with their   recommendations at their soonest availability.  Thank you,  PROVATION

## 2024-04-02 NOTE — TRANSFER OF CARE
"Anesthesia Transfer of Care Note    Patient: Mindi Tate    Procedure(s) Performed: Procedure(s) (LRB):  COLONOSCOPY (N/A)    Patient location: GI    Anesthesia Type: general    Transport from OR: Transported from OR on room air with adequate spontaneous ventilation    Post pain: adequate analgesia    Post assessment: no apparent anesthetic complications and tolerated procedure well    Post vital signs: stable    Level of consciousness: sedated    Nausea/Vomiting: no nausea/vomiting    Complications: none    Transfer of care protocol was followed      Last vitals: Visit Vitals  BP (!) 109/57   Pulse 67   Temp 36.6 °C (97.9 °F)   Resp 16   Ht 5' 10" (1.778 m)   Wt 113.4 kg (250 lb)   SpO2 98%   BMI 35.87 kg/m²     "

## 2024-04-02 NOTE — H&P
Short Stay Endoscopy History and Physical    PCP - [facility]  Referring Physician - Derek Rodríguez MD  [address] [place] [address]    Procedure - Colonoscopy  ASA - per anesthesia  Mallampati - per anesthesia  History of Anesthesia problems - no  Family history Anesthesia problems -  no   Plan of anesthesia - General    HPI  23 y.o. female  Reason for procedure:   Crohn's f/u    ROS:  Constitutional: No fevers, chills, No weight loss  CV: No chest pain  Pulm: No cough, No shortness of breath  GI: see HPI    Medical History:  has a past medical history of ADHD (attention deficit hyperactivity disorder), Crohn's disease ileum/colon (h/o gluteal cleft abscess 2019, transverse, ascending, cecum with extensive pseudoplyps, ascending colon stricture, ileum and fistula from TI to cecum)   (2022), H/O seasonal allergies, Helicobacter pylori gastritis (2022), Immunosuppressed status (2022), NAFLD (nonalcoholic fatty liver disease), Pancreas cyst (2022), and Recurrent pancreatitis (2022).    Surgical History:  has a past surgical history that includes Tonsillectomy (); Adenoidectomy;  section (Left, 2019);  section; Laparoscopic cholecystectomy (N/A, 2019); Colonoscopy (N/A, 10/15/2020); Esophagogastroduodenoscopy (N/A, 2021); Colonoscopy (N/A, 10/7/2021); Colonoscopy (N/A, 10/12/2021); Upper gastrointestinal endoscopy; Cholecystectomy;  section with tubal ligation (N/A, 2023); Esophagogastroduodenoscopy (N/A, 2023); Colonoscopy (N/A, 2023); Esophagogastroduodenoscopy (N/A, 3/18/2024); and Colonoscopy (N/A, 3/18/2024).    Family History: family history includes Asthma in her sister; Breast cancer in her paternal aunt; Crohn's disease in her brother; Multiple sclerosis in her father; Seizures in her father..    Social History:  reports that she has never smoked. She has never used smokeless tobacco.  She reports that she does not currently use alcohol. She reports that she does not use drugs.    Review of patient's allergies indicates:   Allergen Reactions    Aspirin Hives    Ondansetron Nausea And Vomiting       Medications:   Medications Prior to Admission   Medication Sig Dispense Refill Last Dose    acetaminophen (TYLENOL EXTRA STRENGTH) 500 MG tablet Take 1,000 mg by mouth 2 (two) times daily as needed for Pain.   4/1/2024    cetirizine (ZYRTEC) 10 MG tablet Take 10 mg by mouth once daily.   4/1/2024    cholecalciferol, vitamin D3, 125 mcg (5,000 unit) Tab Take 1,000 Units by mouth once daily.   4/2/2024    cyanocobalamin (VITAMIN B-12) 100 MCG tablet Take 100 mcg by mouth once daily.   4/2/2024    lisdexamfetamine (VYVANSE) 40 MG Cap Take 40 mg by mouth once daily.   4/1/2024    ustekinumab (STELARA) 90 mg/mL Syrg syringe Inject 1 mL (90 mg total) into the skin every 6 weeks. 1 mL 0 Past Month    vancomycin (VANCOCIN) 125 MG capsule Take 1 capsule (125 mg total) by mouth 4 (four) times daily. 32 capsule 0 Past Month    busPIRone (BUSPAR) 10 MG tablet Take 10 mg by mouth once daily.   Unknown    calcium carbonate (TUMS) 200 mg calcium (500 mg) chewable tablet Take 2 tablets by mouth daily as needed for Heartburn.       diclofenac sodium (VOLTAREN) 1 % Gel Apply 2 g topically 4 (four) times daily. 100 g 3     fluticasone propionate (FLONASE ALLERGY RELIEF) 50 mcg/actuation nasal spray 2 sprays by Each Nostril route daily as needed for Rhinitis.       LIDOcaine (LIDODERM) 5 % Place 1 patch onto the skin every 24 hours. Remove & Discard patch within 12 hours. 12 hours on 12 hours off       loratadine (CLARITIN) 10 mg tablet Take 10 mg by mouth once daily.       ZINC ORAL Take 1 tablet by mouth once daily.          Physical Exam:    Vital Signs:   Vitals:    04/02/24 1144   BP: 127/73   Pulse: 70   Resp: 16   Temp: 97.9 °F (36.6 °C)       General Appearance: Well appearing in no acute distress  Abdomen: Soft,  non tender, non distended with normal bowel sounds, no masses    Labs:  Lab Results   Component Value Date    WBC 9.47 01/19/2024    HGB 11.7 (L) 01/19/2024    HCT 36.6 (L) 01/19/2024     01/19/2024    CHOL 171 07/28/2023    TRIG 116 07/28/2023    HDL 49 07/28/2023    ALT 38 (H) 01/19/2024    AST 34 01/19/2024     (L) 01/19/2024    K 3.8 01/19/2024     01/19/2024    CREATININE 0.77 01/19/2024    BUN 13 01/19/2024    CO2 23 01/19/2024    TSH 0.792 07/28/2023    INR 1.0 09/07/2023    HGBA1C 5.5 05/04/2021       I have explained the risks and benefits of this endoscopic procedure to the patient including but not limited to bleeding, inflammation, infection, perforation, and death.      Derek Rodríguez MD

## 2024-04-03 ENCOUNTER — PATIENT MESSAGE (OUTPATIENT)
Dept: GASTROENTEROLOGY | Facility: CLINIC | Age: 24
End: 2024-04-03
Payer: MEDICAID

## 2024-04-05 LAB
FINAL PATHOLOGIC DIAGNOSIS: NORMAL
GROSS: NORMAL
Lab: NORMAL

## 2024-04-17 DIAGNOSIS — K50.813 CROHN'S DISEASE OF BOTH SMALL AND LARGE INTESTINE WITH FISTULA: ICD-10-CM

## 2024-04-17 RX ORDER — USTEKINUMAB 90 MG/ML
90 INJECTION, SOLUTION SUBCUTANEOUS
Qty: 1 ML | Refills: 0 | Status: ACTIVE | OUTPATIENT
Start: 2024-04-17 | End: 2024-05-24 | Stop reason: SDUPTHER

## 2024-04-22 PROBLEM — K92.0 HEMATEMESIS: Status: RESOLVED | Noted: 2024-01-18 | Resolved: 2024-04-22

## 2024-05-10 ENCOUNTER — TELEPHONE (OUTPATIENT)
Dept: GASTROENTEROLOGY | Facility: CLINIC | Age: 24
End: 2024-05-10
Payer: MEDICAID

## 2024-05-15 ENCOUNTER — TELEPHONE (OUTPATIENT)
Dept: GASTROENTEROLOGY | Facility: CLINIC | Age: 24
End: 2024-05-15
Payer: MEDICAID

## 2024-05-15 NOTE — TELEPHONE ENCOUNTER
----- Message from Pooja Dickinson RN sent at 5/15/2024  3:09 PM CDT -----  Please call pt to get follow-up appointment with Dr. Rodríguez rescheduled before the end of June to address colonoscopy findings which show a stricture (narrowing of intestine).     Pooja  ----- Message -----  From: Derek Rodríguez MD  Sent: 5/15/2024   2:19 PM CDT  To: Derek Rodríguez MD; [facility] Staff    She was not able to make appt on Monday. I will need to see her given colonoscopy findings show stricture. I need to see her in May or June.     SS

## 2024-05-15 NOTE — TELEPHONE ENCOUNTER
Patient appointment is rescheduled for Thursday 6/13/2024 at 9:00 AM. Patient stated when she showed for her appointment on Monday 5/13, her baby busted his mouth open in the women's bathroom, and she had to take him to the ER. Patient was informed she will get the appointment reminder sent to her portal.

## 2024-05-24 DIAGNOSIS — K50.813 CROHN'S DISEASE OF BOTH SMALL AND LARGE INTESTINE WITH FISTULA: ICD-10-CM

## 2024-05-24 RX ORDER — USTEKINUMAB 90 MG/ML
90 INJECTION, SOLUTION SUBCUTANEOUS
Qty: 1 ML | Refills: 0 | Status: ACTIVE | OUTPATIENT
Start: 2024-05-24

## 2024-06-13 ENCOUNTER — TELEPHONE (OUTPATIENT)
Dept: GASTROENTEROLOGY | Facility: CLINIC | Age: 24
End: 2024-06-13
Payer: MEDICAID

## 2024-06-13 NOTE — TELEPHONE ENCOUNTER
----- Message from Derek Rodríguez MD sent at 6/13/2024 10:17 AM CDT -----  Pt no showed today.   Had warning letter so will need dismissal but please review chart to be sure    SS  ----- Message -----  From: Derek Rodríguez MD  Sent: 5/15/2024   2:19 PM CDT  To: Derek Rodríguez MD; [facility] Staff    She was not able to make appt on Monday. I will need to see her given colonoscopy findings show stricture. I need to see her in May or June.     SS

## 2024-06-20 ENCOUNTER — TELEPHONE (OUTPATIENT)
Dept: GASTROENTEROLOGY | Facility: CLINIC | Age: 24
End: 2024-06-20
Payer: MEDICAID

## 2024-06-20 NOTE — TELEPHONE ENCOUNTER
Called patient to reschedule no show/missed appointment from 6/13/24. Pt answered and said she was at work, she could not talk at the moment, but she does want to reschedule.     FYI: I told her I would send her a portal msg, and that if it was more convenient, to respond there, or to call us back when she was available. Patient understood. I have her response coming to Dr. Rodríguez's staff box IF she does respond through the portal.

## 2024-07-02 DIAGNOSIS — K50.813 CROHN'S DISEASE OF BOTH SMALL AND LARGE INTESTINE WITH FISTULA: ICD-10-CM

## 2024-07-02 RX ORDER — USTEKINUMAB 90 MG/ML
90 INJECTION, SOLUTION SUBCUTANEOUS
Qty: 1 ML | Refills: 0 | Status: CANCELLED | OUTPATIENT
Start: 2024-07-02

## 2024-07-15 ENCOUNTER — TELEPHONE (OUTPATIENT)
Dept: GASTROENTEROLOGY | Facility: CLINIC | Age: 24
End: 2024-07-15
Payer: MEDICAID

## 2024-07-15 DIAGNOSIS — K50.813 CROHN'S DISEASE OF BOTH SMALL AND LARGE INTESTINE WITH FISTULA: ICD-10-CM

## 2024-07-15 RX ORDER — USTEKINUMAB 90 MG/ML
90 INJECTION, SOLUTION SUBCUTANEOUS
Qty: 1 ML | Refills: 0 | Status: ACTIVE | OUTPATIENT
Start: 2024-07-15

## 2024-07-15 RX ORDER — USTEKINUMAB 90 MG/ML
90 INJECTION, SOLUTION SUBCUTANEOUS
Qty: 1 ML | Refills: 0 | Status: SHIPPED | OUTPATIENT
Start: 2024-07-15 | End: 2024-07-15

## 2024-07-15 NOTE — TELEPHONE ENCOUNTER
----- Message from Katrina Soto RN sent at 7/15/2024 11:55 AM CDT -----  Regarding: FW: refill  Contact: [phone]  Dismissal process started 7/10/24. We haven't gotten notice that the letter has been sent out. We probably need to continue treatment at this time.  ----- Message -----  From: Olivia Schulte  Sent: 7/15/2024  11:35 AM CDT  To: [facility] Staff  Subject: refill                                           Pt requesting a refill   Medication name ustekinumab (STELARA) 90 mg/mL Syrg syringe  Pharmacy [facility]      Pt was last was regarding  medication and pt medication due tomorrow

## 2024-07-15 NOTE — TELEPHONE ENCOUNTER
"Primary provider: SS    IBD medications Stelara 90mg every 6 weeks    Refill request for stelara    Allergies reviewed Yes    Drug Monitoring labs/frequency for all IBD meds:  CBC CMP every 6 mo; TB and hep B yearly     Lab Results   Component Value Date    HEPBSAG Non-reactive 07/28/2023    HEPBCAB Non-reactive 07/28/2023     Lab Results   Component Value Date    TBGOLDPLUS Negative 07/28/2023     No results found for: "QUANTNILVALU", "QUANTIFERON", "QUANTTBGDPL"   Lab Results   Component Value Date    TSPOTSCREN See result image under hyperlink in Epic 10/13/2021     Lab Results   Component Value Date    OUWLBLXW56EN 21 (L) 07/28/2023    XGIZXMJE51 460 01/18/2024     Lab Results   Component Value Date    WBC 9.47 01/19/2024    HGB 11.7 (L) 01/19/2024    HCT 36.6 (L) 01/19/2024    MCV 77 (L) 01/19/2024     01/19/2024     Lab Results   Component Value Date    CREATININE 0.77 01/19/2024    ALBUMIN 4.4 01/19/2024    BILITOT 0.6 01/19/2024    ALKPHOS 66 01/19/2024    AST 34 01/19/2024    ALT 38 (H) 01/19/2024       Lab due date (mo/yr):  7/2024    Labs scheduled: no - dismissal process pending since 7/10 due to multiple no shows and cancellations      Next appt: no appt     RX refill sent to provider for amount until next labs: Yes      "

## 2024-07-18 ENCOUNTER — TELEPHONE (OUTPATIENT)
Dept: GASTROENTEROLOGY | Facility: CLINIC | Age: 24
End: 2024-07-18
Payer: MEDICAID

## 2024-07-18 NOTE — TELEPHONE ENCOUNTER
Unable to contact pt with phone number [phone], fast busy signal. PM sent, will await pt response.

## 2024-07-18 NOTE — TELEPHONE ENCOUNTER
----- Message from Gabriela Goldsmith sent at 7/17/2024 10:45 AM CDT -----  Regarding: Appointment  Contact: [phone] (work)  Calling to schedule appointment due to follow up. Please contact patient as soon as possible.

## 2024-08-19 ENCOUNTER — TELEPHONE (OUTPATIENT)
Dept: GASTROENTEROLOGY | Facility: CLINIC | Age: 24
End: 2024-08-19
Payer: MEDICAID

## 2024-08-19 DIAGNOSIS — K50.813 CROHN'S DISEASE OF BOTH SMALL AND LARGE INTESTINE WITH FISTULA: Primary | ICD-10-CM

## 2024-08-19 RX ORDER — USTEKINUMAB 90 MG/ML
90 INJECTION, SOLUTION SUBCUTANEOUS
Qty: 1 ML | Refills: 0 | Status: ACTIVE | OUTPATIENT
Start: 2024-08-19

## 2024-08-19 NOTE — TELEPHONE ENCOUNTER
Patient currently on stelara 90mg SC every 6 weeks (started 2/1/2022, optimized to Q6W on 4/11/23, LD 7/18, ND 8/29)  Despite being outside of the 30 day window from dismissal notice will provide one additional fill of stelara to allow time for pt to establish care with outside GI provider  Pt notified and script sent to [facility]  Case discussed with Dr. Rodríguez

## 2024-08-19 NOTE — TELEPHONE ENCOUNTER
Patient is dismissed from the department for non-compliance (no shows). She showed to the office today. I contacted Gerald in Patient Relations. Patient was sent a certified and regular mail dismissal on 7/17. I verified with patient relations that dismissal was complete and patient was informed. Stressed to patient that she would need to find another Gastro provider to assume her care and ensure continuance of her treatment

## 2025-01-06 PROBLEM — M22.8X1 PATELLAR MALTRACKING, RIGHT: Status: ACTIVE | Noted: 2025-01-06

## 2025-01-06 PROBLEM — S83.211A BUCKET HANDLE TEAR OF MEDIAL MENISCUS OF RIGHT KNEE: Status: ACTIVE | Noted: 2025-01-06

## 2025-01-13 ENCOUNTER — PATIENT MESSAGE (OUTPATIENT)
Dept: RESEARCH | Facility: OTHER | Age: 25
End: 2025-01-13
Payer: MEDICAID

## 2025-02-28 NOTE — TELEPHONE ENCOUNTER
Outgoing call to patient, patient recently had ER visit due to abdominal pain, n/v , it appears she was started on Keflex.  If patient is on Keflex she will need to hold if there is an active injection. Opening I-vent.   c/w statin    Discussed with primary team.      Chriss Espinal D.O  [phone]
